# Patient Record
Sex: FEMALE | Race: OTHER | HISPANIC OR LATINO | Employment: FULL TIME | URBAN - METROPOLITAN AREA
[De-identification: names, ages, dates, MRNs, and addresses within clinical notes are randomized per-mention and may not be internally consistent; named-entity substitution may affect disease eponyms.]

---

## 2017-02-17 ENCOUNTER — ALLSCRIPTS OFFICE VISIT (OUTPATIENT)
Dept: OTHER | Facility: OTHER | Age: 27
End: 2017-02-17

## 2017-02-17 DIAGNOSIS — M25.551 PAIN IN RIGHT HIP: ICD-10-CM

## 2017-02-17 DIAGNOSIS — R20.2 PARESTHESIA OF SKIN: ICD-10-CM

## 2017-02-19 ENCOUNTER — APPOINTMENT (OUTPATIENT)
Dept: LAB | Facility: HOSPITAL | Age: 27
End: 2017-02-19
Payer: COMMERCIAL

## 2017-02-19 ENCOUNTER — TRANSCRIBE ORDERS (OUTPATIENT)
Dept: LAB | Facility: HOSPITAL | Age: 27
End: 2017-02-19

## 2017-02-19 DIAGNOSIS — M25.551 PAIN IN RIGHT HIP: ICD-10-CM

## 2017-02-19 DIAGNOSIS — R20.2 PARESTHESIA OF SKIN: ICD-10-CM

## 2017-02-19 LAB
ALBUMIN SERPL BCP-MCNC: 3.5 G/DL (ref 3.5–5)
ALP SERPL-CCNC: 72 U/L (ref 46–116)
ALT SERPL W P-5'-P-CCNC: 26 U/L (ref 12–78)
ANION GAP SERPL CALCULATED.3IONS-SCNC: 9 MMOL/L (ref 4–13)
AST SERPL W P-5'-P-CCNC: 10 U/L (ref 5–45)
BASOPHILS # BLD AUTO: 0.01 THOUSANDS/ΜL (ref 0–0.1)
BASOPHILS NFR BLD AUTO: 0 % (ref 0–1)
BILIRUB SERPL-MCNC: 0.43 MG/DL (ref 0.2–1)
BUN SERPL-MCNC: 9 MG/DL (ref 5–25)
CALCIUM SERPL-MCNC: 8.8 MG/DL (ref 8.3–10.1)
CHLORIDE SERPL-SCNC: 108 MMOL/L (ref 100–108)
CO2 SERPL-SCNC: 27 MMOL/L (ref 21–32)
CREAT SERPL-MCNC: 0.69 MG/DL (ref 0.6–1.3)
EOSINOPHIL # BLD AUTO: 0.25 THOUSAND/ΜL (ref 0–0.61)
EOSINOPHIL NFR BLD AUTO: 4 % (ref 0–6)
ERYTHROCYTE [DISTWIDTH] IN BLOOD BY AUTOMATED COUNT: 13.2 % (ref 11.6–15.1)
GFR SERPL CREATININE-BSD FRML MDRD: >60 ML/MIN/1.73SQ M
GLUCOSE SERPL-MCNC: 81 MG/DL (ref 65–140)
HCT VFR BLD AUTO: 37.7 % (ref 34.8–46.1)
HGB BLD-MCNC: 12.5 G/DL (ref 11.5–15.4)
LYMPHOCYTES # BLD AUTO: 1.95 THOUSANDS/ΜL (ref 0.6–4.47)
LYMPHOCYTES NFR BLD AUTO: 34 % (ref 14–44)
MCH RBC QN AUTO: 28.9 PG (ref 26.8–34.3)
MCHC RBC AUTO-ENTMCNC: 33.2 G/DL (ref 31.4–37.4)
MCV RBC AUTO: 87 FL (ref 82–98)
MONOCYTES # BLD AUTO: 0.41 THOUSAND/ΜL (ref 0.17–1.22)
MONOCYTES NFR BLD AUTO: 7 % (ref 4–12)
NEUTROPHILS # BLD AUTO: 3.15 THOUSANDS/ΜL (ref 1.85–7.62)
NEUTS SEG NFR BLD AUTO: 55 % (ref 43–75)
NRBC BLD AUTO-RTO: 0 /100 WBCS
PLATELET # BLD AUTO: 257 THOUSANDS/UL (ref 149–390)
PMV BLD AUTO: 11.1 FL (ref 8.9–12.7)
POTASSIUM SERPL-SCNC: 3.5 MMOL/L (ref 3.5–5.3)
PROT SERPL-MCNC: 7.5 G/DL (ref 6.4–8.2)
RBC # BLD AUTO: 4.32 MILLION/UL (ref 3.81–5.12)
SODIUM SERPL-SCNC: 144 MMOL/L (ref 136–145)
TSH SERPL DL<=0.05 MIU/L-ACNC: 1.1 UIU/ML (ref 0.36–3.74)
WBC # BLD AUTO: 5.78 THOUSAND/UL (ref 4.31–10.16)

## 2017-02-19 PROCEDURE — 85025 COMPLETE CBC W/AUTO DIFF WBC: CPT

## 2017-02-19 PROCEDURE — 84443 ASSAY THYROID STIM HORMONE: CPT

## 2017-02-19 PROCEDURE — 80053 COMPREHEN METABOLIC PANEL: CPT

## 2017-02-19 PROCEDURE — 36415 COLL VENOUS BLD VENIPUNCTURE: CPT

## 2017-02-20 ENCOUNTER — HOSPITAL ENCOUNTER (OUTPATIENT)
Dept: RADIOLOGY | Facility: HOSPITAL | Age: 27
Discharge: HOME | End: 2017-02-20
Payer: COMMERCIAL

## 2017-02-20 DIAGNOSIS — M25.551 PAIN IN RIGHT HIP: ICD-10-CM

## 2017-02-20 PROCEDURE — 73502 X-RAY EXAM HIP UNI 2-3 VIEWS: CPT

## 2017-03-08 ENCOUNTER — ALLSCRIPTS OFFICE VISIT (OUTPATIENT)
Dept: OTHER | Facility: OTHER | Age: 27
End: 2017-03-08

## 2017-03-28 ENCOUNTER — GENERIC CONVERSION - ENCOUNTER (OUTPATIENT)
Dept: OTHER | Facility: OTHER | Age: 27
End: 2017-03-28

## 2017-03-30 ENCOUNTER — ALLSCRIPTS OFFICE VISIT (OUTPATIENT)
Dept: OTHER | Facility: OTHER | Age: 27
End: 2017-03-30

## 2017-03-30 DIAGNOSIS — R19.7 DIARRHEA: ICD-10-CM

## 2017-04-18 ENCOUNTER — GENERIC CONVERSION - ENCOUNTER (OUTPATIENT)
Dept: OTHER | Facility: OTHER | Age: 27
End: 2017-04-18

## 2017-05-24 ENCOUNTER — HOSPITAL ENCOUNTER (EMERGENCY)
Facility: HOSPITAL | Age: 27
Discharge: HOME | End: 2017-05-24
Attending: EMERGENCY MEDICINE | Admitting: EMERGENCY MEDICINE
Payer: COMMERCIAL

## 2017-05-24 VITALS
SYSTOLIC BLOOD PRESSURE: 120 MMHG | WEIGHT: 238 LBS | BODY MASS INDEX: 39.61 KG/M2 | RESPIRATION RATE: 16 BRPM | OXYGEN SATURATION: 99 % | HEART RATE: 88 BPM | TEMPERATURE: 98 F | DIASTOLIC BLOOD PRESSURE: 69 MMHG

## 2017-05-24 DIAGNOSIS — R10.9 NONSPECIFIC ABDOMINAL PAIN: Primary | ICD-10-CM

## 2017-05-24 LAB
ALBUMIN SERPL BCP-MCNC: 3.3 G/DL (ref 3.5–5)
ALP SERPL-CCNC: 69 U/L (ref 46–116)
ALT SERPL W P-5'-P-CCNC: 22 U/L (ref 12–78)
ANION GAP SERPL CALCULATED.3IONS-SCNC: 7 MMOL/L (ref 4–13)
AST SERPL W P-5'-P-CCNC: 7 U/L (ref 5–45)
BASOPHILS # BLD AUTO: 0.01 THOUSANDS/ΜL (ref 0–0.1)
BASOPHILS NFR BLD AUTO: 0 % (ref 0–1)
BILIRUB SERPL-MCNC: 0.28 MG/DL (ref 0.2–1)
BUN SERPL-MCNC: 12 MG/DL (ref 5–25)
CALCIUM SERPL-MCNC: 8.7 MG/DL (ref 8.3–10.1)
CHLORIDE SERPL-SCNC: 106 MMOL/L (ref 100–108)
CO2 SERPL-SCNC: 28 MMOL/L (ref 21–32)
CREAT SERPL-MCNC: 0.74 MG/DL (ref 0.6–1.3)
EOSINOPHIL # BLD AUTO: 0.36 THOUSAND/ΜL (ref 0–0.61)
EOSINOPHIL NFR BLD AUTO: 4 % (ref 0–6)
ERYTHROCYTE [DISTWIDTH] IN BLOOD BY AUTOMATED COUNT: 12.8 % (ref 11.6–15.1)
GFR SERPL CREATININE-BSD FRML MDRD: >60 ML/MIN/1.73SQ M
GLUCOSE SERPL-MCNC: 88 MG/DL (ref 65–140)
HCT VFR BLD AUTO: 37.8 % (ref 34.8–46.1)
HGB BLD-MCNC: 12.8 G/DL (ref 11.5–15.4)
LIPASE SERPL-CCNC: 106 U/L (ref 73–393)
LYMPHOCYTES # BLD AUTO: 2.02 THOUSANDS/ΜL (ref 0.6–4.47)
LYMPHOCYTES NFR BLD AUTO: 22 % (ref 14–44)
MCH RBC QN AUTO: 29.5 PG (ref 26.8–34.3)
MCHC RBC AUTO-ENTMCNC: 33.9 G/DL (ref 31.4–37.4)
MCV RBC AUTO: 87 FL (ref 82–98)
MONOCYTES # BLD AUTO: 0.54 THOUSAND/ΜL (ref 0.17–1.22)
MONOCYTES NFR BLD AUTO: 6 % (ref 4–12)
NEUTROPHILS # BLD AUTO: 6.37 THOUSANDS/ΜL (ref 1.85–7.62)
NEUTS SEG NFR BLD AUTO: 68 % (ref 43–75)
NRBC BLD AUTO-RTO: 0 /100 WBCS
PLATELET # BLD AUTO: 229 THOUSANDS/UL (ref 149–390)
PMV BLD AUTO: 10.5 FL (ref 8.9–12.7)
POTASSIUM SERPL-SCNC: 3.9 MMOL/L (ref 3.5–5.3)
PROT SERPL-MCNC: 7.4 G/DL (ref 6.4–8.2)
RBC # BLD AUTO: 4.34 MILLION/UL (ref 3.81–5.12)
SODIUM SERPL-SCNC: 141 MMOL/L (ref 136–145)
WBC # BLD AUTO: 9.31 THOUSAND/UL (ref 4.31–10.16)

## 2017-05-24 PROCEDURE — 80053 COMPREHEN METABOLIC PANEL: CPT | Performed by: EMERGENCY MEDICINE

## 2017-05-24 PROCEDURE — 36415 COLL VENOUS BLD VENIPUNCTURE: CPT | Performed by: EMERGENCY MEDICINE

## 2017-05-24 PROCEDURE — 83690 ASSAY OF LIPASE: CPT | Performed by: EMERGENCY MEDICINE

## 2017-05-24 PROCEDURE — 99284 EMERGENCY DEPT VISIT MOD MDM: CPT

## 2017-05-24 PROCEDURE — 85025 COMPLETE CBC W/AUTO DIFF WBC: CPT | Performed by: EMERGENCY MEDICINE

## 2017-05-24 RX ORDER — DICYCLOMINE HCL 20 MG
20 TABLET ORAL ONCE
Status: COMPLETED | OUTPATIENT
Start: 2017-05-24 | End: 2017-05-24

## 2017-05-24 RX ORDER — MAGNESIUM HYDROXIDE/ALUMINUM HYDROXICE/SIMETHICONE 120; 1200; 1200 MG/30ML; MG/30ML; MG/30ML
30 SUSPENSION ORAL ONCE
Status: COMPLETED | OUTPATIENT
Start: 2017-05-24 | End: 2017-05-24

## 2017-05-24 RX ORDER — ONDANSETRON 4 MG/1
4 TABLET, FILM COATED ORAL EVERY 6 HOURS
Qty: 20 TABLET | Refills: 0 | Status: SHIPPED | OUTPATIENT
Start: 2017-05-24 | End: 2018-05-21

## 2017-05-24 RX ORDER — ONDANSETRON 4 MG/1
4 TABLET, ORALLY DISINTEGRATING ORAL ONCE
Status: COMPLETED | OUTPATIENT
Start: 2017-05-24 | End: 2017-05-24

## 2017-05-24 RX ORDER — SUCRALFATE 1 G/1
1 TABLET ORAL 4 TIMES DAILY
Qty: 30 TABLET | Refills: 0 | Status: SHIPPED | OUTPATIENT
Start: 2017-05-24 | End: 2018-05-21

## 2017-05-24 RX ADMIN — ONDANSETRON 4 MG: 4 TABLET, ORALLY DISINTEGRATING ORAL at 22:00

## 2017-05-24 RX ADMIN — LIDOCAINE HYDROCHLORIDE 15 ML: 20 SOLUTION ORAL; TOPICAL at 21:06

## 2017-05-24 RX ADMIN — ALUMINUM HYDROXIDE, MAGNESIUM HYDROXIDE, AND SIMETHICONE 30 ML: 200; 200; 20 SUSPENSION ORAL at 21:06

## 2017-05-24 RX ADMIN — ONDANSETRON 4 MG: 4 TABLET, ORALLY DISINTEGRATING ORAL at 21:06

## 2017-05-24 RX ADMIN — DICYCLOMINE HYDROCHLORIDE 20 MG: 20 TABLET ORAL at 21:59

## 2017-05-25 ENCOUNTER — ALLSCRIPTS OFFICE VISIT (OUTPATIENT)
Dept: OTHER | Facility: OTHER | Age: 27
End: 2017-05-25

## 2017-06-16 ENCOUNTER — ALLSCRIPTS OFFICE VISIT (OUTPATIENT)
Dept: OTHER | Facility: OTHER | Age: 27
End: 2017-06-16

## 2017-07-12 ENCOUNTER — ALLSCRIPTS OFFICE VISIT (OUTPATIENT)
Dept: OTHER | Facility: OTHER | Age: 27
End: 2017-07-12

## 2017-07-12 DIAGNOSIS — R10.9 ABDOMINAL PAIN: ICD-10-CM

## 2017-07-21 ENCOUNTER — APPOINTMENT (OUTPATIENT)
Dept: LAB | Facility: CLINIC | Age: 27
End: 2017-07-21
Payer: COMMERCIAL

## 2017-07-21 DIAGNOSIS — R10.9 ABDOMINAL PAIN: ICD-10-CM

## 2017-07-21 DIAGNOSIS — R19.7 DIARRHEA: ICD-10-CM

## 2017-07-21 LAB
ALBUMIN SERPL BCP-MCNC: 3.7 G/DL (ref 3.5–5)
ALP SERPL-CCNC: 87 U/L (ref 46–116)
ALT SERPL W P-5'-P-CCNC: 73 U/L (ref 12–78)
ANION GAP SERPL CALCULATED.3IONS-SCNC: 6 MMOL/L (ref 4–13)
AST SERPL W P-5'-P-CCNC: 36 U/L (ref 5–45)
BASOPHILS # BLD AUTO: 0.02 THOUSANDS/ΜL (ref 0–0.1)
BASOPHILS NFR BLD AUTO: 0 % (ref 0–1)
BILIRUB SERPL-MCNC: 0.63 MG/DL (ref 0.2–1)
BUN SERPL-MCNC: 7 MG/DL (ref 5–25)
CALCIUM SERPL-MCNC: 9.2 MG/DL (ref 8.3–10.1)
CHLORIDE SERPL-SCNC: 105 MMOL/L (ref 100–108)
CO2 SERPL-SCNC: 28 MMOL/L (ref 21–32)
CREAT SERPL-MCNC: 0.73 MG/DL (ref 0.6–1.3)
CRP SERPL QL: 23.2 MG/L
EOSINOPHIL # BLD AUTO: 0.27 THOUSAND/ΜL (ref 0–0.61)
EOSINOPHIL NFR BLD AUTO: 6 % (ref 0–6)
ERYTHROCYTE [DISTWIDTH] IN BLOOD BY AUTOMATED COUNT: 13.3 % (ref 11.6–15.1)
ERYTHROCYTE [SEDIMENTATION RATE] IN BLOOD: 18 MM/HOUR (ref 0–20)
GFR SERPL CREATININE-BSD FRML MDRD: >60 ML/MIN/1.73SQ M
GLUCOSE P FAST SERPL-MCNC: 72 MG/DL (ref 65–99)
HCT VFR BLD AUTO: 40.9 % (ref 34.8–46.1)
HGB BLD-MCNC: 13.5 G/DL (ref 11.5–15.4)
LYMPHOCYTES # BLD AUTO: 1.66 THOUSANDS/ΜL (ref 0.6–4.47)
LYMPHOCYTES NFR BLD AUTO: 35 % (ref 14–44)
MCH RBC QN AUTO: 28.8 PG (ref 26.8–34.3)
MCHC RBC AUTO-ENTMCNC: 33 G/DL (ref 31.4–37.4)
MCV RBC AUTO: 87 FL (ref 82–98)
MONOCYTES # BLD AUTO: 0.65 THOUSAND/ΜL (ref 0.17–1.22)
MONOCYTES NFR BLD AUTO: 14 % (ref 4–12)
NEUTROPHILS # BLD AUTO: 2.14 THOUSANDS/ΜL (ref 1.85–7.62)
NEUTS SEG NFR BLD AUTO: 45 % (ref 43–75)
NRBC BLD AUTO-RTO: 0 /100 WBCS
PLATELET # BLD AUTO: 249 THOUSANDS/UL (ref 149–390)
PMV BLD AUTO: 10.9 FL (ref 8.9–12.7)
POTASSIUM SERPL-SCNC: 3.6 MMOL/L (ref 3.5–5.3)
PROT SERPL-MCNC: 7.9 G/DL (ref 6.4–8.2)
RBC # BLD AUTO: 4.69 MILLION/UL (ref 3.81–5.12)
SODIUM SERPL-SCNC: 139 MMOL/L (ref 136–145)
TSH SERPL DL<=0.05 MIU/L-ACNC: 1.17 UIU/ML (ref 0.36–3.74)
WBC # BLD AUTO: 4.74 THOUSAND/UL (ref 4.31–10.16)

## 2017-07-21 PROCEDURE — 83516 IMMUNOASSAY NONANTIBODY: CPT

## 2017-07-21 PROCEDURE — 85025 COMPLETE CBC W/AUTO DIFF WBC: CPT

## 2017-07-21 PROCEDURE — 82784 ASSAY IGA/IGD/IGG/IGM EACH: CPT

## 2017-07-21 PROCEDURE — 80053 COMPREHEN METABOLIC PANEL: CPT

## 2017-07-21 PROCEDURE — 86255 FLUORESCENT ANTIBODY SCREEN: CPT

## 2017-07-21 PROCEDURE — 36415 COLL VENOUS BLD VENIPUNCTURE: CPT

## 2017-07-21 PROCEDURE — 84443 ASSAY THYROID STIM HORMONE: CPT

## 2017-07-21 PROCEDURE — 86140 C-REACTIVE PROTEIN: CPT

## 2017-07-21 PROCEDURE — 85652 RBC SED RATE AUTOMATED: CPT

## 2017-07-23 LAB
ENDOMYSIUM IGA SER QL: NEGATIVE
GLIADIN PEPTIDE IGA SER-ACNC: 6 UNITS (ref 0–19)
GLIADIN PEPTIDE IGG SER-ACNC: 2 UNITS (ref 0–19)
IGA SERPL-MCNC: 163 MG/DL (ref 87–352)
TTG IGA SER-ACNC: <2 U/ML (ref 0–3)
TTG IGG SER-ACNC: 5 U/ML (ref 0–5)

## 2017-07-24 ENCOUNTER — GENERIC CONVERSION - ENCOUNTER (OUTPATIENT)
Dept: OTHER | Facility: OTHER | Age: 27
End: 2017-07-24

## 2017-08-01 ENCOUNTER — GENERIC CONVERSION - ENCOUNTER (OUTPATIENT)
Dept: OTHER | Facility: OTHER | Age: 27
End: 2017-08-01

## 2017-08-03 ENCOUNTER — ANESTHESIA EVENT (OUTPATIENT)
Dept: GASTROENTEROLOGY | Facility: HOSPITAL | Age: 27
End: 2017-08-03
Payer: COMMERCIAL

## 2017-08-03 RX ORDER — ETONOGESTREL AND ETHINYL ESTRADIOL 11.7; 2.7 MG/1; MG/1
1 INSERT, EXTENDED RELEASE VAGINAL
COMMUNITY
End: 2018-05-21

## 2017-08-03 RX ORDER — DICYCLOMINE HCL 20 MG
20 TABLET ORAL EVERY 6 HOURS
COMMUNITY
End: 2018-05-21

## 2017-08-04 ENCOUNTER — HOSPITAL ENCOUNTER (OUTPATIENT)
Facility: HOSPITAL | Age: 27
Setting detail: OUTPATIENT SURGERY
Discharge: HOME | End: 2017-08-04
Attending: INTERNAL MEDICINE | Admitting: INTERNAL MEDICINE
Payer: COMMERCIAL

## 2017-08-04 ENCOUNTER — GENERIC CONVERSION - ENCOUNTER (OUTPATIENT)
Dept: OTHER | Facility: OTHER | Age: 27
End: 2017-08-04

## 2017-08-04 ENCOUNTER — ANESTHESIA (OUTPATIENT)
Dept: GASTROENTEROLOGY | Facility: HOSPITAL | Age: 27
End: 2017-08-04
Payer: COMMERCIAL

## 2017-08-04 VITALS
WEIGHT: 238 LBS | HEART RATE: 66 BPM | DIASTOLIC BLOOD PRESSURE: 74 MMHG | HEIGHT: 65 IN | OXYGEN SATURATION: 99 % | BODY MASS INDEX: 39.65 KG/M2 | SYSTOLIC BLOOD PRESSURE: 118 MMHG | TEMPERATURE: 98.1 F | RESPIRATION RATE: 20 BRPM

## 2017-08-04 DIAGNOSIS — R10.9 ABDOMINAL PAIN: ICD-10-CM

## 2017-08-04 LAB — EXT PREGNANCY TEST URINE: NEGATIVE

## 2017-08-04 PROCEDURE — 81025 URINE PREGNANCY TEST: CPT | Performed by: ANESTHESIOLOGY

## 2017-08-04 PROCEDURE — 88342 IMHCHEM/IMCYTCHM 1ST ANTB: CPT | Performed by: INTERNAL MEDICINE

## 2017-08-04 PROCEDURE — 88305 TISSUE EXAM BY PATHOLOGIST: CPT | Performed by: INTERNAL MEDICINE

## 2017-08-04 RX ORDER — PROPOFOL 10 MG/ML
INJECTION, EMULSION INTRAVENOUS AS NEEDED
Status: DISCONTINUED | OUTPATIENT
Start: 2017-08-04 | End: 2017-08-04 | Stop reason: SURG

## 2017-08-04 RX ORDER — 0.9 % SODIUM CHLORIDE 0.9 %
5 VIAL (ML) INJECTION AS NEEDED
Status: CANCELLED | OUTPATIENT
Start: 2017-08-04

## 2017-08-04 RX ORDER — SODIUM CHLORIDE 9 MG/ML
125 INJECTION, SOLUTION INTRAVENOUS CONTINUOUS
Status: DISCONTINUED | OUTPATIENT
Start: 2017-08-04 | End: 2017-08-04 | Stop reason: HOSPADM

## 2017-08-04 RX ORDER — LIDOCAINE HYDROCHLORIDE 20 MG/ML
INJECTION, SOLUTION INFILTRATION; PERINEURAL AS NEEDED
Status: DISCONTINUED | OUTPATIENT
Start: 2017-08-04 | End: 2017-08-04 | Stop reason: SURG

## 2017-08-04 RX ORDER — MIDAZOLAM HYDROCHLORIDE 1 MG/ML
INJECTION INTRAMUSCULAR; INTRAVENOUS AS NEEDED
Status: DISCONTINUED | OUTPATIENT
Start: 2017-08-04 | End: 2017-08-04 | Stop reason: SURG

## 2017-08-04 RX ADMIN — PROPOFOL 50 MG: 10 INJECTION, EMULSION INTRAVENOUS at 11:50

## 2017-08-04 RX ADMIN — PROPOFOL 100 MG: 10 INJECTION, EMULSION INTRAVENOUS at 11:38

## 2017-08-04 RX ADMIN — MIDAZOLAM HYDROCHLORIDE 2 MG: 1 INJECTION, SOLUTION INTRAMUSCULAR; INTRAVENOUS at 11:32

## 2017-08-04 RX ADMIN — PROPOFOL 100 MG: 10 INJECTION, EMULSION INTRAVENOUS at 11:45

## 2017-08-04 RX ADMIN — PROPOFOL 100 MG: 10 INJECTION, EMULSION INTRAVENOUS at 11:42

## 2017-08-04 RX ADMIN — LIDOCAINE HYDROCHLORIDE 3 ML: 20 INJECTION, SOLUTION INFILTRATION; PERINEURAL at 11:35

## 2017-08-04 RX ADMIN — PROPOFOL 100 MG: 10 INJECTION, EMULSION INTRAVENOUS at 11:35

## 2017-08-04 RX ADMIN — PROPOFOL 100 MG: 10 INJECTION, EMULSION INTRAVENOUS at 11:48

## 2017-08-04 RX ADMIN — SODIUM CHLORIDE 125 ML/HR: 0.9 INJECTION, SOLUTION INTRAVENOUS at 10:33

## 2017-08-08 ENCOUNTER — TRANSCRIBE ORDERS (OUTPATIENT)
Dept: ADMINISTRATIVE | Facility: HOSPITAL | Age: 27
End: 2017-08-08

## 2017-08-08 DIAGNOSIS — R10.9 ABDOMINAL PAIN, UNSPECIFIED SITE: Primary | ICD-10-CM

## 2017-08-18 ENCOUNTER — ALLSCRIPTS OFFICE VISIT (OUTPATIENT)
Dept: OTHER | Facility: OTHER | Age: 27
End: 2017-08-18

## 2017-08-22 ENCOUNTER — HOSPITAL ENCOUNTER (OUTPATIENT)
Dept: NUCLEAR MEDICINE | Facility: HOSPITAL | Age: 27
Discharge: HOME | End: 2017-08-22
Attending: INTERNAL MEDICINE
Payer: COMMERCIAL

## 2017-08-22 DIAGNOSIS — R10.9 ABDOMINAL PAIN, UNSPECIFIED SITE: ICD-10-CM

## 2017-08-23 ENCOUNTER — GENERIC CONVERSION - ENCOUNTER (OUTPATIENT)
Dept: OTHER | Facility: OTHER | Age: 27
End: 2017-08-23

## 2018-01-11 NOTE — RESULT NOTES
Verified Results  (1) COMPREHENSIVE METABOLIC PANEL 82PZP3490 78:44CS Miah Marie Odilia Order Number: [id number]     Test Name Result Flag Reference   SODIUM 139 mmol/L  136-145   POTASSIUM 3 6 mmol/L  3 5-5 3   CHLORIDE 105 mmol/L  100-108   CARBON DIOXIDE 28 mmol/L  21-32   ANION GAP (CALC) 6 mmol/L  4-13   BLOOD UREA NITROGEN 7 mg/dL  5-25   CREATININE 0 73 mg/dL  0 60-1 30   Standardized to IDMS reference method   CALCIUM 9 2 mg/dL  8 3-10 1   BILI, TOTAL 0 63 mg/dL  0 20-1 00   ALK PHOSPHATAS 87 U/L     ALT (SGPT) 73 U/L  12-78   AST(SGOT) 36 U/L  5-45   ALBUMIN 3 7 g/dL  3 5-5 0   TOTAL PROTEIN 7 9 g/dL  6 4-8 2   eGFR Non-African American      >60 0 ml/min/1 73sq m   [facility] Disease Education Program recommendations are as follows:  GFR calculation is accurate only with a steady state creatinine  Chronic Kidney disease less than 60 ml/min/1 73 sq  meters  Kidney failure less than 15 ml/min/1 73 sq  meters  GLUCOSE FASTING 72 mg/dL  65-99     (1) CBC/PLT/DIFF 34TXP4683 09:51AM Benjamintiffanie Margie    Order Number: [id number]     Test Name Result Flag Reference   WBC COUNT 4 74 Thousand/uL  4 31-10 16   RBC COUNT 4 69 Million/uL  3 81-5 12   HEMOGLOBIN 13 5 g/dL  11 5-15 4   HEMATOCRIT 40 9 %  34 8-46  1   MCV 87 fL  82-98   MCH 28 8 pg  26 8-34 3   MCHC 33 0 g/dL  31 4-37 4   RDW 13 3 %  11 6-15 1   MPV 10 9 fL  8 9-12 7   PLATELET COUNT 772 Thousands/uL  149-390   nRBC AUTOMATED 0 /100 WBCs     NEUTROPHILS RELATIVE PERCENT 45 %  43-75   LYMPHOCYTES RELATIVE PERCENT 35 %  14-44   MONOCYTES RELATIVE PERCENT 14 % H 4-12   EOSINOPHILS RELATIVE PERCENT 6 %  0-6   BASOPHILS RELATIVE PERCENT 0 %  0-1   NEUTROPHILS ABSOLUTE COUNT 2 14 Thousands/? ??L  1 85-7 62   LYMPHOCYTES ABSOLUTE COUNT 1 66 Thousands/? ??L  0 60-4 47   MONOCYTES ABSOLUTE COUNT 0 65 Thousand/? ??L  0 17-1 22   EOSINOPHILS ABSOLUTE COUNT 0 27 Thousand/? ??L  0 00-0 61   BASOPHILS ABSOLUTE COUNT 0 02 Thousands/? ? ? L 0  00-0 10   - Patient Instructions: This bloodwork is non-fasting  Please drink two glasses of water morning of bloodwork  (1) TSH WITH FT4 REFLEX 77Mft5511 09:51AM Юлия Marie Order Number: [id number]     Test Name Result Flag Reference   TSH 1 170 uIU/mL  0 358-3 740   Patients undergoing fluorescein dye angiography may retain small amounts of fluorescein in the body for 48-72 hours post procedure  Samples containing fluorescein can produce falsely depressed TSH values  If the patient had this procedure,a specimen should be resubmitted post fluorescein clearance            The recommended reference ranges for TSH during pregnancy are as follows:  First trimester 0 1 to 2 5 uIU/mL  Second trimester  0 2 to 3 0 uIU/mL  Third trimester 0 3 to 3 0 uIU/m

## 2018-01-11 NOTE — PROGRESS NOTES
Active Problems    1  Asthma (493 90) (J45 909)   2  Currently pregnant (V22 2) (Z33 1)   3  Fetal choroid plexus cysts affecting antepartum care of mother (655 03) (O35 8XX0)   4  History of Morbid or severe obesity due to excess calories (278 01) (E66 01)    Current Meds    1  Iron Supplement TABS; Therapy: (Recorded:12Jan2016) to Recorded    Allergies    1  No Known Drug Allergies    2  Food   3  FRUIT   4  Seafood   5  Shellfish    Results/Data  28902 Abdominal Ultrasound OB Cheryle Fore:   Procedure: 58994- Ultrasound pregnant uterus real time with image documentation, limited one or more fetuses  The study was done today in the office  Indication: EDC gestational age 43w4d with an EDC of 2/21/2016 weeks  Exam indication: obesity  Findings:   Amniotic fluid volume: 48 3 + 44 5 + 29 3 + 3 8mm = 125 9mm  Fetal position: vertex  Impression: Appropriate DUNCAN for fetal age  Future Appointments    Date/Time Provider Specialty Site   02/08/2016 11:15 AM JOSH Mayer  Obstetrics/Gynecology [facility]   02/22/2016 11:15 AM JOSH Mayer   Obstetrics[facility]necology [facility]   02/15/2016 01:45 PM Jennifer Whatley MD Obstetrics/Gynecology [facility]   02/08/2016 10:30 AM Dontrell Garibay Ultrasound [facility]   02/15/2016 01:00 PM Dontrell Garibay Ultrasound [facility]   02/22/2016 10:30 AM Dontrell Garibay Ultrasound [facility]     Signatures   Electronically signed by : Ellis Palmer, ; Feb 8 2016 10:33AM EST                       (Author)    Electronically signed by : JOSH Arana ; Feb 8 2016 11:16AM EST

## 2018-01-11 NOTE — RESULT NOTES
Verified Results  (1) SED RATE 21Jul2017 09:51AM Efrain PARSONS Order Number: [id number]     Test Name Result Flag Reference   SED RATE 18 mm/hour  0-20     (1) C-REACTIVE PROTEIN 21Jul2017 09:51AM Liz Hernandez Order Number: [id number]     Test Name Result Flag Reference   C-REACT PROTEIN 23 2 mg/L H <3 0     (1) CELIAC DISEASE AB PROFILE 21Jul2017 09:51AM Liz Hernandez Order Number: [id number]     Test Name Result Flag Reference   tTG IGG 5 U/mL  0 - 5   Negative        0 - 5                                Weak Positive   6 - 9                                Positive           >9   tTG IGA <2 U/mL  0 - 3   Negative        0 -  3                                Weak Positive   4 - 10                                Positive           >10   Tissue Transglutaminase (tTG) has been identified   as the endomysial antigen  Studies have demonstr-   ated that endomysial IgA antibodies have over 99%   specificity for gluten sensitive enteropathy     GLIADA 6 units  0 - 19   Negative                   0 - 19                     Weak Positive             20 - 30                     Moderate to Strong Positive   >30   GLIADG 2 units  0 - 19   Negative                   0 - 19                     Weak Positive             20 - 30                     Moderate to Strong Positive   >30   ENDOMYSIAL AB IGA Negative  Negative   Performed at:  [address] [place] [address]  [id number]  : Martine Roblero MD, Phone:  [phone]    mg/dL  15 - 417

## 2018-01-12 NOTE — MISCELLANEOUS
Message   Date: 11 Jan 2016 8:27 AM EST, Recorded By: Kiara Panchal For: Karan Schmidt: Kermit Flaherty, Self   Phone: [phone] [facility]), [phone] (Work)   Spoke with patient  She called wanting to come in today, but she was unavailable at any of the several times I offered her  I advised her that she is not following the medical advice that we are giving her and that she is putting herself and her baby at risk  She said she she has to work  I reminded her I set her schedule up according what was fine with her schedule at the time  I told her we will write her a note for work stating the importance of her visits and she is required to attend them to be medically compliant  She said she cannot call in sick again and I advised her she does not have to call in sick, she can go to work, leave for her visit, and then return to work after she is seen  I will leave a note at the  for her for her employer  Patient is agreeable  Active Problems   1  Asthma (493 90) (J45 909)  2  Currently pregnant (V22 2) (Z33 1)  3  Fetal choroid plexus cysts affecting antepartum care of mother (655 03) (O35 8XX0)  4  History of Morbid or severe obesity due to excess calories (278 01) (E66 01)    Current Meds  1  No Reported Medications  Requested for: 67OFM7204 Recorded    Allergies   1  No Known Drug Allergies   2  Food  3  FRUIT  4  Seafood  5   Shellfish    Signatures   Electronically signed by : Shad Carlos, ; Jan 11 2016  8:50AM EST                       (Author)

## 2018-01-13 VITALS
OXYGEN SATURATION: 99 % | BODY MASS INDEX: 40.97 KG/M2 | HEIGHT: 64 IN | SYSTOLIC BLOOD PRESSURE: 110 MMHG | WEIGHT: 240 LBS | HEART RATE: 84 BPM | TEMPERATURE: 98.8 F | RESPIRATION RATE: 18 BRPM | DIASTOLIC BLOOD PRESSURE: 80 MMHG

## 2018-01-13 VITALS
DIASTOLIC BLOOD PRESSURE: 80 MMHG | TEMPERATURE: 97.6 F | BODY MASS INDEX: 40.63 KG/M2 | OXYGEN SATURATION: 98 % | HEIGHT: 64 IN | SYSTOLIC BLOOD PRESSURE: 120 MMHG | WEIGHT: 238 LBS | HEART RATE: 81 BPM

## 2018-01-13 VITALS
OXYGEN SATURATION: 97 % | HEART RATE: 82 BPM | WEIGHT: 236 LBS | DIASTOLIC BLOOD PRESSURE: 68 MMHG | HEIGHT: 64 IN | BODY MASS INDEX: 40.29 KG/M2 | RESPIRATION RATE: 20 BRPM | TEMPERATURE: 97.4 F | SYSTOLIC BLOOD PRESSURE: 118 MMHG

## 2018-01-13 NOTE — MISCELLANEOUS
Message   Date: 28 Mar 2017 8:27 AM EST, Recorded By: Rosales Solo For: Melissa Denise: Ana Sanchez, Self   Phone: [phone] [facility]), [phone] (Work)   Reason: Renew Medication   Patient called for refill of Nuva Ring  Escript sent  Advised schedule yearly exam         Active Problems    1  Anxiety disorder (300 00) (F41 9)   2  Asthma (493 90) (J45 909)   3  Bipolar depression (296 50) (F31 30)   4  Birth control counseling (V25 09) (Z30 09)   5  Chronic right hip pain (719 45,338 29) (M25 551,G89 29)   6  Contraception (V25 9) (Z30 9)   7  Depression (311) (F32 9)   8  Encounter for postpartum visit (V24 2) (Z39 2)   9  Endometriosis (617 9) (N80 9)   10  Female pelvic pain (625 9) (R10 2)   11  Irregular menses (626 4) (N92 6)   12  History of IUD contraception (V45 51) (Z97 5)   13  History of Morbid or severe obesity due to excess calories (278 01) (E66 01)   14  Paresthesia of both feet (782 0) (R20 2)   15  Postprandial RUQ pain (789 01) (R10 11)    Current Meds   1  NuvaRing 0 12-0 015 MG/24HR Vaginal Ring; INSERT 1 RING VAGINALLY FOR 3   WEEKS THEN 1 WEEK OFF; Therapy: 68BHF5808 to (Last Rx:60Bvm5559)  Requested for: 65WKG1717 Ordered    Allergies    1  No Known Drug Allergies    2  Food   3  FRUIT   4  Seafood   5   Shellfish    Plan  Contraception    · NuvaRing 0 12-0 015 MG/24HR Vaginal Ring; INSERT 1 RING VAGINALLY FOR  3 WEEKS THEN 1 WEEK OFF    Signatures   Electronically signed by : Albina Salas, ; Mar 28 2017  8:28AM EST                       (Author)

## 2018-01-13 NOTE — PROGRESS NOTES
Active Problems    1  Asthma (493 90) (J45 909)   2  Currently pregnant (V22 2) (Z33 1)   3  Fetal choroid plexus cysts affecting antepartum care of mother (655 03) (O35 8XX0)   4  History of Morbid or severe obesity due to excess calories (278 01) (E66 01)    Current Meds    1  Iron Supplement TABS; Therapy: (Recorded:12Jan2016) to Recorded    Allergies    1  No Known Drug Allergies    2  Food   3  FRUIT   4  Seafood   5  Shellfish    Results/Data  81025 Abdominal Ultrasound OB Sridevi Christian:   Procedure: 42860- Ultrasound pregnant uterus real time with image documentation, limited one or more fetuses  The study was done today in the office  Indication: EDC gestational age 42w2d with an [facility] 2/21/2016 weeks  Exam indication: obesity  Findings:   Amniotic fluid volume: 54 4 + 21 3 + 33 1 +10 7 = 119 5mm  Fetal heart beat: 139bpm    Fetal position: Vertex  Impression: Appropriate DUNCAN for fetal age  Future Appointments    Date/Time Provider Specialty Site   02/08/2016 11:15 AM JOSH Anderson  Obstetrics/Gynecology [facility]   02/15/2016 01:45 PM Janna Giles MD Obstetrics/Gynecology [facility]   02/08/2016 11:00 AM Araseli Estrella Nurse Schedule  [facility]   02/22/2016 11:15 AM Araseli Estrella Nurse Schedule  [facility]   02/08/2016 10:30 AM [facility]   02/15/2016 01:00 PM Mechelle Vickers  [facility]   02/22/2016 10:30 AM Araseli Estrella [facility]trasound [facility]     Signatures   Electronically signed by : Layne Garcia, ; Feb 5 2016 10:40AM EST                       (Author)    Electronically signed by :  Nayely Sherman MD; Feb 11 2016  6:59AM EST

## 2018-01-13 NOTE — MISCELLANEOUS
Message  Patient called she wants to have the Mirena iud placed instead of having a tubal ligation  Ok per Dr Ml Bear to schedule for insertion  Patient will refrain from intercourse for 2 weeks and come in and have a pregnancy test on the day of the insertion  She just stopped breastfeeding and has not gotten her menses yet  Appointment given for Dr Ml Bear  Active Problems    1  Anxiety disorder (300 00) (F41 9)   2  Asthma (493 90) (J45 909)   3  Bipolar depression (296 50) (F31 30)   4  Birth control counseling (V25 09) (Z30 9)   5  Depression (311) (F32 9)   6  Encounter for postpartum visit (V24 2) (Z39 2)   7  Female pelvic pain (625 9) (R10 2)   8  Fetal choroid plexus cysts affecting antepartum care of mother (655 03) (O35 8XX0)   9  History of Morbid or severe obesity due to excess calories (278 01) (E66 01)   10  Postprandial RUQ pain (789 01) (R10 11)    Current Meds   1  Multi-Vitamin TABS; Therapy: (Recorded:05Apr2016) to Recorded    Allergies    1  No Known Drug Allergies    2  Food   3  FRUIT   4  Seafood   5  Shellfish    Signatures   Electronically signed by :  Alessandro Curiel, ; May 19 2016  1:23PM EST                       (Author)

## 2018-01-14 VITALS
OXYGEN SATURATION: 98 % | DIASTOLIC BLOOD PRESSURE: 68 MMHG | HEART RATE: 116 BPM | BODY MASS INDEX: 40.63 KG/M2 | HEIGHT: 64 IN | WEIGHT: 238 LBS | RESPIRATION RATE: 18 BRPM | SYSTOLIC BLOOD PRESSURE: 100 MMHG | TEMPERATURE: 98 F

## 2018-01-14 VITALS
SYSTOLIC BLOOD PRESSURE: 112 MMHG | HEIGHT: 64 IN | WEIGHT: 235 LBS | HEART RATE: 80 BPM | DIASTOLIC BLOOD PRESSURE: 74 MMHG | OXYGEN SATURATION: 98 % | TEMPERATURE: 99 F | BODY MASS INDEX: 40.12 KG/M2

## 2018-01-14 VITALS
OXYGEN SATURATION: 99 % | RESPIRATION RATE: 16 BRPM | HEIGHT: 64 IN | BODY MASS INDEX: 40.83 KG/M2 | TEMPERATURE: 97.5 F | WEIGHT: 239.13 LBS | HEART RATE: 101 BPM | SYSTOLIC BLOOD PRESSURE: 108 MMHG | DIASTOLIC BLOOD PRESSURE: 78 MMHG

## 2018-01-14 NOTE — MISCELLANEOUS
Message  Return to work or school:   Anna Conway is under my professional care  She was seen in my office on 3/30/17--written by [employer]   Electronically signed by : SUZAN Gimenez;  Apr 18 2017  9:33AM EST                       (Author)

## 2018-01-14 NOTE — MISCELLANEOUS
Message   Date: 23 Aug 2017 4:32 PM EST, Recorded By: Efren Enrique For: Anthony Villatoro: Marshal Garcia, Self   Phone: [phone] (Home), [phone] (Work)   Reason: Medical Complaint   PT IS ON A HIGH DOSE OF ANTIBIOTIC FOR A STOMACH INFECTION    SHE HAS YEAST SX  Jes Cutting CALLED IN SCRIPT FOR PT  Jes Cutting Active Problems    1  Abdominal pain (789 00) (R10 9)   2  Anxiety disorder (300 00) (F41 9)   3  Asthma (493 90) (J45 909)   4  Bipolar depression (296 50) (F31 30)   5  Birth control counseling (V25 09) (Z30 09)   6  Chronic right hip pain (719 45,338 29) (M25 551,G89 29)   7  Contraception (V25 9) (Z30 9)   8  Depression (311) (F32 9)   9  Diarrhea (787 91) (R19 7)   10  Endometriosis (617 9) (N80 9)   11  Epigastric pain (789 06) (R10 13)   12  Fatigue (780 79) (R53 83)   13  Female pelvic pain (625 9) (R10 2)   14  H  pylori infection (041 86) (A04 8)   15  Irregular menses (626 4) (N92 6)   16  Irritable bowel syndrome with both constipation and diarrhea (564 1) (K58 2)   17  History of IUD contraception (V45 51) (Z97 5)   18  Loss of appetite (783 0) (R63 0)   19  History of Morbid or severe obesity due to excess calories (278 01) (E66 01)   20  Nausea (787 02) (R11 0)   21  Nausea and vomiting (787 01) (R11 2)   22  Paresthesia of both feet (782 0) (R20 2)   23  Postprandial RUQ pain (789 01) (R10 11)   24  Yeast infection (112 9) (B37 9)    Current Meds   1  Amoxicillin 500 MG Oral Capsule; TAKE 2 CAPSULE Twice daily; Therapy: 03BFH6927 to (Evaluate:53Aug2053)  Requested for: 34NFI4415; Last   Rx:70Gyp0479 Ordered   2  Clarithromycin 500 MG Oral Tablet; TAKE 1 TABLET TWICE DAILY UNTIL FINISHED; Therapy: 31SVA7829 to (Evaluate:02Jxy9687)  Requested for: 60ECA7809; Last   Rx:45Byw6714 Ordered   3  Dicyclomine HCl - 20 MG Oral Tablet; TAKE 1 TABLET EVERY 6 HOURS AS NEEDED; Therapy: 47HJD3851 to (Evaluate:26Wag6762)  Requested for: 50UOW7794; Last   Rx:87Rux1284 Ordered   4  NuvaRing 0 12-0 015 MG/24HR Vaginal Ring; INSERT 1 RING VAGINALLY FOR 3   WEEKS THEN 1 WEEK OFF; Therapy: 14XLH7117 to (Last Rx:28Mar2017)  Requested for: 19Xmk9398; Status:   ACTIVE - Renewal Denied Ordered   5  Ondansetron 4 MG Oral Tablet Disintegrating; May use 1-2 tablets every 6 hours, as   needed for nausea; Therapy: 82BML3495 to (Evaluate:90Ybf3838)  Requested for: 02CUN1139; Last   Rx:16Jun2017 Ordered   6  Protonix 40 MG Oral Tablet Delayed Release (Pantoprazole Sodium); TAKE 1 TABLET   DAILY; Therapy: 63MUT9667 to (Evaluate:59Jlk4158)  Requested for: 21SID0867; Last   Rx:70Jyo3422 Ordered   7  Sucralfate 1 GM Oral Tablet (Carafate); TAKE 1 TABLET 4 TIMES DAILY on an empty   stomach; Therapy: 20EOA8203 to (Evaluate:70Jkv9272)  Requested for: 40WGU1887; Last   Rx:16Jun2017 Ordered   8  TriLyte 420 GM Oral Solution Reconstituted; TAKE AS DIRECTED; Therapy: 35Bbk4130 to (Last Rx:91Ejv0322)  Requested for: 11Lud0798 Ordered    Allergies    1  No Known Drug Allergies    2  Food   3  FRUIT   4  Seafood   5   Shellfish    Plan  Yeast infection    · From  Fluconazole 150 MG Oral Tablet  TABS PO X 7  To Fluconazole 150 MG  Oral Tablet TABS PO X 7    Signatures   Electronically signed by : Randy Ralph, ; Aug 23 2017  4:32PM EST                       (Author)

## 2018-01-15 NOTE — MISCELLANEOUS
Message   Recorded as Task   Date: 10/31/2016 10:52 AM, Created By: [id number]   Task Name: Care Coordination   Assigned To: Maria Del Carmen Coon   Regarding Patient: Anali Fair, Status: In Progress   Comment:    Maria Del Carmen Coon - 31 Oct 2016 10:52 AM     TASK CREATED  pt has been bleeding for 3 weeks and is passing blood clots    I told her to use Ibuprofen, but do you want to give her  Lysteda? Piper Lightning   she is not supposed to remove her nuva ring until Friday    is that ok or hsould she remove it now?   Jae Goodman - 31 Oct 2016 12:31 PM     TASK REPLIED TO: Previously Assigned To Jae Goodman  Would suggest she waited until Friday to remove NuvaRing  Could consider Lysteda, but normally this is for heavy bleeding without metrorrhagia  Would suggest she hang in there and take ibuprofen and remove NuvaRing as scheduled  Hopefully she will then have which should be a normal period and then things will be reset   Maria Del Carmen Coon - 31 Oct 2016 3:27 PM     TASK IN PROGRESS        Active Problems    1  Anxiety disorder (300 00) (F41 9)   2  Asthma (493 90) (J45 909)   3  Bipolar depression (296 50) (F31 30)   4  Birth control counseling (V25 09) (Z30 9)   5  Contraception (V25 9) (Z30 9)   6  Depression (311) (F32 9)   7  Encounter for postpartum visit (V24 2) (Z39 2)   8  Endometriosis (617 9) (N80 9)   9  Female pelvic pain (625 9) (R10 2)   10  Fetal choroid plexus cysts affecting antepartum care of mother (655 03) (O35 8XX0)   11  Irregular menses (626 4) (N92 6)   12  History of IUD contraception (V45 51) (Z97 5)   13  History of Morbid or severe obesity due to excess calories (278 01) (E66 01)   14  Postprandial RUQ pain (789 01) (R10 11)    Current Meds   1  NuvaRing 0 12-0 015 MG/24HR Vaginal Ring; INSERT 1 RING VAGINALLY FOR 3   WEEKS THEN 1 WEEK OFF; Therapy: 74HVA4299 to ([address])  Requested for: 01LQW3524; Last   Rx:13Oct2016 Ordered    Allergies    1  No Known Drug Allergies    2  Food   3  FRUIT   4  Seafood   5   Shellfish    Signatures   Electronically signed by : Charleen Miranda, ; Nov 1 2016  2:22PM EST                       (Author)

## 2018-01-15 NOTE — PROGRESS NOTES
Active Problems    1  Asthma (493 90) (J45 909)   2  Currently pregnant (V22 2) (Z33 1)   3  Fetal choroid plexus cysts affecting antepartum care of mother (655 03) (O35 8XX0)   4  History of Morbid or severe obesity due to excess calories (278 01) (E66 01)    Current Meds    1  Iron Supplement TABS; Therapy: (Recorded:12Jan2016) to Recorded    Allergies    1  No Known Drug Allergies    2  Food   3  FRUIT   4  Seafood   5  Shellfish    Results/Data  93712 Abdominal Ultrasound OB Azucena Puffer:   Procedure: 00026- Ultrasound pregnant uterus real time with image documentation, limited one or more fetuses  The study was done today in the office  Indication: EDC gestational age 43w3d with an EDC of 2/21/2016 weeks  Exam indication: obesity  Findings:   Amniotic fluid volume: 43 3 + 17 8 + 21 6 + 12 7 = 95 4mm  Fetal heart beat: 144bpm    Fetal position: Vertex  Impression: Appropriate DUNCAN for fetal age  Future Appointments    Date/Time Provider Specialty Site   02/01/2016 11:15 AM Fernando Weems MD Obstetrics/Gynecology [facility]   02/15/2016 01:45 PM Fernando Weems MD Obstetrics/Gynecology [facility]   02/08/2016 11:00 AM Lyssa Kingsley Nurse Schedule  [facility]   02/22/2016 11:15 AM Lyssa Kingsley Nurse Schedule  [facility]   02/01/2016 10:45 AM Lyssa Teetee, Ultrasound [facility]   02/08/2016 10:30 AM Lyssa Teetee, Ultrasound [facility]   02/15/2016 01:00 PM Lyssa Teetee, Ultrasound [facility]   02/22/2016 10:30 AM Lyssa Teetee, Ultrasound [facility]     Signatures   Electronically signed by : Judy Hooks, ; Jan 27 2016  1:05PM EST                       (Author)    Electronically signed by :  Sal Jacques MD; Jan 27 2016  3:13PM EST

## 2018-01-15 NOTE — MISCELLANEOUS
Message   Recorded as Task   Date: 05/19/2016 01:23 PM, Created By: Kapil Arzate   Task Name: Call Back   Assigned To: Kapil Arzate   Regarding Patient: Hannah Ayers, Status: In Progress   Horace Campuzano - 19 May 2016 1:23 PM     TASK CREATED  Mirena iud to be ordered  Kapil Arzate - 19 May 2016 1:23 PM     TASK IN PROGRESS   Kapil Arzate - 24 May 2016 7:39 AM     TASK EDITED  Mirena iud covered under plans  Patient given an appt for insertion with DR Trever Chawla        Active Problems    1  Anxiety disorder (300 00) (F41 9)   2  Asthma (493 90) (J45 909)   3  Bipolar depression (296 50) (F31 30)   4  Birth control counseling (V25 09) (Z30 9)   5  Depression (311) (F32 9)   6  Encounter for postpartum visit (V24 2) (Z39 2)   7  Female pelvic pain (625 9) (R10 2)   8  Fetal choroid plexus cysts affecting antepartum care of mother (655 03) (O35 8XX0)   9  History of Morbid or severe obesity due to excess calories (278 01) (E66 01)   10  Postprandial RUQ pain (789 01) (R10 11)    Current Meds   1  Multi-Vitamin TABS; Therapy: (Recorded:05Apr2016) to Recorded    Allergies    1  No Known Drug Allergies    2  Food   3  FRUIT   4  Seafood   5  Shellfish    Signatures   Electronically signed by :  Kendal Patel, ; May 24 2016  7:39AM EST                       (Author)

## 2018-01-15 NOTE — MISCELLANEOUS
Message  Return to work or school:   Jose Faulkner is under my professional care   She was seen in my office on 08/18/2017             Signatures   Electronically signed by : Viki Malagon, ; Aug 18 2017  3:47PM EST                       (Author)

## 2018-01-15 NOTE — RESULT NOTES
Verified Results  (1) CBC/PLT/DIFF 80OHP3152 10:35AM Gilford Scarpa Order Number: [id number]     Order Number: [id number]     Test Name Result Flag Reference   WBC COUNT 5 79 Thousand/uL  4 31-10 16   RBC COUNT 4 72 Million/uL  3 81-5 12   HEMOGLOBIN 13 2 g/dL  11 5-15 4   HEMATOCRIT 40 5 %  34 8-46  1   MCV 86 fL  82-98   MCH 28 0 pg  26 8-34 3   MCHC 32 6 g/dL  31 4-37 4   RDW 13 6 %  11 6-15 1   MPV 11 0 fL  8 9-12 7   PLATELET COUNT 940 Thousands/uL  149-390   NEUTROPHILS RELATIVE PERCENT 63 %  43-75   LYMPHOCYTES RELATIVE PERCENT 24 %  14-44   MONOCYTES RELATIVE PERCENT 8 %  4-12   EOSINOPHILS RELATIVE PERCENT 5 %  0-6   BASOPHILS RELATIVE PERCENT 0 %  0-1   NEUTROPHILS ABSOLUTE COUNT 3 66 Thousands/µL  1 85-7 62   LYMPHOCYTES ABSOLUTE COUNT 1 39 Thousands/µL  0 60-4 47   MONOCYTES ABSOLUTE COUNT 0 47 Thousand/µL  0 17-1 22   EOSINOPHILS ABSOLUTE COUNT 0 26 Thousand/µL  0 00-0 61   BASOPHILS ABSOLUTE COUNT 0 01 Thousands/µL  0 00-0 10     (1) COMPREHENSIVE METABOLIC PANEL 77WOX5661 75:19RF Gilford Scarpa Order Number: [id number]      National Kidney Disease Education Program recommendations are as follows:  GFR calculation is accurate only with a steady state creatinine  Chronic Kidney disease less than 60 ml/min/1 73 sq  meters  Kidney failure less than 15 ml/min/1 73 sq  meters  Test Name Result Flag Reference   GLUCOSE,RANDM 91 mg/dL     If the patient is fasting, the ADA then defines impaired fasting glucose as > 100 mg/dL and diabetes as > or equal to 123 mg/dL     SODIUM 142 mmol/L  136-145   POTASSIUM 4 1 mmol/L  3 5-5 3   CHLORIDE 104 mmol/L  100-108   CARBON DIOXIDE 30 mmol/L  21-32   ANION GAP (CALC) 8 mmol/L  4-13   BLOOD UREA NITROGEN 15 mg/dL  5-25   CREATININE 0 78 mg/dL  0 60-1 30   Standardized to IDMS reference method   CALCIUM 8 7 mg/dL  8 3-10 1   BILI, TOTAL 0 44 mg/dL  0 20-1 00   ALK PHOSPHATAS 115 U/L     ALT (SGPT) 34 U/L  12-78   AST(SGOT) 15 U/L  5-45 ALBUMIN 3 4 g/dL L 3 5-5 0   TOTAL PROTEIN 7 5 g/dL  6 4-8 2   eGFR Non-African American      >60 0 ml/min/1 73sq m     (1) LIPASE 25Mar2016 10:35AM Localler Order Number: [id number]     Test Name Result Flag Reference   LIPASE 107 u/L       [place] U  23  [id number] 10:19AM Localler Order Number: [id number]     Test Name Result Flag Reference   US GALLBLADDER (Report)     RIGHT UPPER QUADRANT ULTRASOUND     INDICATION: Intermittent epigastric pain for 2 weeks, nausea and vomiting        COMPARISON: Ultrasound 9/20/2010     TECHNIQUE:  Real-time ultrasound of the right upper quadrant was performed with a curvilinear transducer with both volumetric sweeps and still imaging techniques  FINDINGS:     PANCREAS: Visualized portions of the pancreas are within normal limits  AORTA AND IVC: Visualized portions are normal for patient age  LIVER:   Size: Within normal range  The liver measures 16 6 cm in the midclavicular line  Contour: Surface contour is smooth  Parenchyma: Echogenicity and echotexture are within normal limits  No evidence of suspicious mass  The main portal vein is patent and hepatopetal       BILIARY:   The gallbladder is normal in caliber  No wall thickening or pericholecystic fluid  No stones or sludge identified  No sonographic Santiago's sign  No intrahepatic biliary dilatation  CBD measures 6 mm  No choledocholithiasis  KIDNEY:    Right kidney measures 12 1 x 4 5 cm  Within normal limits  ASCITES:  None         IMPRESSION:     Normal        Workstation performed: [id number]     Signed by:   Serafin Nugent DO   3/25/16

## 2018-01-15 NOTE — PROGRESS NOTES
Active Problems    1  Asthma (493 90) (J45 909)   2  Currently pregnant (V22 2) (Z33 1)   3  Fetal choroid plexus cysts affecting antepartum care of mother (655 03) (O35 8XX0)   4  History of Morbid or severe obesity due to excess calories (278 01) (E66 01)    Current Meds    1  Iron Supplement TABS; Therapy: (Recorded:12Jan2016) to Recorded    Allergies    1  No Known Drug Allergies    2  Food   3  FRUIT   4  Seafood   5  Shellfish    Results/Data  46910 Abdominal Ultrasound OB Rehana Sauce:   Procedure: 33562- Ultrasound pregnant uterus real time with image documentation, limited one or more fetuses  The study was done today in the office  Indication: EDC gestational age 32w1d with an EDC of 2/21/2016 weeks  Exam indication: obesity  Findings:   Amniotic fluid volume: 61 1 + 26 7 + 50 9 + 34 3 = 173 0mm  Fetal heart beat: 163bpm    Fetal position: vertex  Impression: Appropriate DUNCAN for fetal age  Future Appointments    Date/Time Provider Specialty Site   01/25/2016 01:45 PM JOSH Oakley   Obstetrics/Gynecology [facility]   02/01/2016 11:15 AM Alisa Foster MD Obstetrics/Gynecology [facility]   02/15/2016 01:45 PM Alisa Foster MD Obstetrics/Gynecology [facility]   02/08/2016 11:00 AM Etha Moulding, Nurse Schedule  [facility]   02/22/2016 11:15 AM Etha Moulding, Nurse Schedule  [facility]   01/25/2016 01:00 PM Etha Moulding, Ultrasound [facility]   02/01/2016 10:45 AM [facility]   02/08/2016 10:30 AM Etha Moulding, Ultrasound Schedul  [facility]   02/15/2016 01:00 PM Etha Moulding, Ultrasound [facility]   02/22/2016 10:30 AM Etha Moulding, Ultrasound [facility]     Signatures   Electronically signed by : Michelle Lowe, ; Jan 19 2016  2:08PM EST                       (Author)    Electronically signed by : JOSH Zazueta ; Jan 19 2016  3:36PM EST

## 2018-01-16 NOTE — MISCELLANEOUS
Message   Date: 09 Feb 2016 7:57 AM EST, Recorded By: Nick Jerez For: Oriana Gone: Jasmyn Stafford, Self   Phone: [phone] [facility]), [phone] (Work)   Reason: Medical Complaint   OB patient called c/o contx since last night 2230  Now 5 mins apart, no ROM, FM WNL, Pt states moderate pain with contx  Dr Nikki Ball informed, sent pt to L&D, called L&D to inform  Active Problems    1  Asthma (493 90) (J45 909)   2  Currently pregnant (V22 2) (Z33 1)   3  Fetal choroid plexus cysts affecting antepartum care of mother (655 03) (O35 8XX0)   4  History of Morbid or severe obesity due to excess calories (278 01) (E66 01)    Current Meds   1  Iron Supplement TABS; Therapy: (Recorded:12Jan2016) to Recorded    Allergies    1  No Known Drug Allergies    2  Food   3  FRUIT   4  Seafood   5   Shellfish    Signatures   Electronically signed by : Deepa Mccall, ; Feb 9 2016  8:04AM EST                       (Author)

## 2018-01-16 NOTE — MISCELLANEOUS
Provider Comments  Provider Comments:   Pt didn't show to her appointment at 0800        Signatures   Electronically signed by : Louann Bamberger, PA; Mar  8 2017  8:21AM EST                       (Author)

## 2018-01-17 NOTE — PROGRESS NOTES
Active Problems    1  Asthma (493 90) (J45 909)   2  Currently pregnant (V22 2) (Z33 1)   3  Fetal choroid plexus cysts affecting antepartum care of mother (655 03) (O35 8XX0)   4  History of Morbid or severe obesity due to excess calories (278 01) (E66 01)    Current Meds    1  No Reported Medications  Requested for: 28LPB5139 Recorded    Allergies    1  No Known Drug Allergies    2  Food   3  FRUIT   4  Seafood   5  Shellfish    Results/Data  07502 Abdominal Ultrasound OB Fredderick Face:   Procedure: 09201- Ultrasound pregnant uterus real time with image documentation, limited one or more fetuses  The study was done today in the office  Indication: EDC gestational age 35w2d with an EDC of 2/21/2016 weeks  Exam indication: obesity  Findings:   Amniotic fluid volume: 31 8 + + 22 8 + 45 8 + 45 8 = 146 2mm  Fetal heart beat: 142bpm    Fetal position: Vertex  Impression: Appropriate DUNCAN for fetal age  Future Appointments    Date/Time Provider Specialty Site   01/19/2016 02:45 PM JOSH Mcnamara  Obstetrics/Gynecology [facility]   02/02/2016 03:15 PM JOSH Mcnamara  Obstetrics/Gynecology [facility]   02/09/2016 03:15 PM JOSH Mcnamara  Obstetrics/Gynecology [facility]   02/16/2016 02:45 PM JOSH Mcnamara   Obstetrics/Gynecology [facility]   01/26/2016 04:00 PM Bibi Christiansen MD Obstetrics/Gynecology [facility]   02/23/2016 03:30 PM Bibi Christiansen MD Obstetrics/Gynecology [facility]   01/19/2016 02:00 PM Carlito Toussaint Ultrasound Schedul  [facility]   01/26/2016 03:00 PM Carlito Toussaint Ultrasound [place]  [facility]   02/02/2016 02:45 PM Carlito Toussaint Ultrasound [place]  [facility]   02/09/2016 02:45 PM Carlito Toussaint Ultrasound Schedul  [facility]   02/16/2016 02:00 PM Carlito Toussaint Ultrasound [place]  [facility]   02/23/2016 02:30 PM Carlito Toussaint Ultrasound [facility]     Signatures   Electronically signed by : Aliyah Pal, ; Jan 12 2016  2:45PM EST                       (Author)    Electronically signed by :  Lisseth Bartlett MD; Jan 12 2016  4:12PM EST

## 2018-01-17 NOTE — PROGRESS NOTES
Active Problems    1  Asthma (493 90) (J45 909)   2  Currently pregnant (V22 2) (Z33 1)   3  Fetal choroid plexus cysts affecting antepartum care of mother (655 03) (O35 8XX0)   4  History of Morbid or severe obesity due to excess calories (278 01) (E66 01)    Current Meds    1  Iron Supplement TABS; Therapy: (Recorded:12Jan2016) to Recorded    Allergies    1  No Known Drug Allergies    2  Food   3  FRUIT   4  Seafood   5  Shellfish    Results/Data  25312 Abdominal Ultrasound OB Gabriel Parkinson:   Procedure: 57632- Ultrasound pregnant uterus real time with image documentation, limited one or more fetuses  The study was done today in the office  Indication: EDC gestational age 44w3d with an EDC of 2/21/2016 weeks  Exam indication: obesity  Findings:   Amniotic fluid volume: 6 2 + 5 1 + 33 1 +20 4 = 120 9mm  Fetal position: vertex  Impression: Appropriate DUNCAN for fetal age        Signatures   Electronically signed by : Maribell Up, ; Feb 22 2016 10:36AM EST                       (Author)    Electronically signed by : JOSH Escamilla ; Feb 22 2016 12:26PM EST

## 2018-01-18 NOTE — MISCELLANEOUS
Message   Recorded as Task   Date: 10/11/2016 09:17 AM, Created By: Carlyn Nair   Task Name: Medical Complaint Callback   Assigned To: [facility]   Regarding Patient: Carlynn Baumgarten, Status: Active   CommentJanis Harrington - 11 Oct 2016 9:17 AM     TASK CREATED  Patient called to report her Mirena came out last night when she was removing a tampon  It was inserted on 6/2/16  She denies any pain  She has her MP now  She wants another one  How is this handled? [facility] - 11 Oct 2016 9:47 AM     TASK EDITED                 Spoke to patient, insurance will not cover another device since this was just done in 6/2016  Explained this to patient  I made her an appt tomorrow for a birth control discussion with Dr Fabiola Garcia  Active Problems    1  Anxiety disorder (300 00) (F41 9)   2  Asthma (493 90) (J45 909)   3  Bipolar depression (296 50) (F31 30)   4  Birth control counseling (V25 09) (Z30 9)   5  Depression (311) (F32 9)   6  Encounter for postpartum visit (V24 2) (Z39 2)   7  Endometriosis (617 9) (N80 9)   8  Female pelvic pain (625 9) (R10 2)   9  Fetal choroid plexus cysts affecting antepartum care of mother (655 03) (O35 8XX0)   10  Irregular menses (626 4) (N92 6)   11  IUD contraception (V45 51) (Z97 5)   12  History of Morbid or severe obesity due to excess calories (278 01) (E66 01)   13  Postprandial RUQ pain (789 01) (R10 11)    Current Meds   1  Mirena IUD; Therapy: (Recorded:50Aat9415) to Recorded    Allergies    1  No Known Drug Allergies    2  Food   3  FRUIT   4  Seafood   5  Shellfish    Signatures   Electronically signed by :  Ron Kelley, ; Oct 11 2016  9:47AM EST                       (Author)

## 2018-01-18 NOTE — MISCELLANEOUS
Message   Date: 12 Oct 2016 1:18 PM EST, Recorded By: Chas De La Torre For: Antonietta Duarte: Kermit Flaherty, Self   Phone: [phone] [facility]), [phone] (Work)   Reason: Medical Complaint   pt is bleeding heavy with blood clots    pt was supposed to have a birth control discussion, but needs to be evaluated for this pain and bleeding    pt will keep her appt today           Active Problems    1  Anxiety disorder (300 00) (F41 9)   2  Asthma (493 90) (J45 909)   3  Bipolar depression (296 50) (F31 30)   4  Birth control counseling (V25 09) (Z30 9)   5  Depression (311) (F32 9)   6  Encounter for postpartum visit (V24 2) (Z39 2)   7  Endometriosis (617 9) (N80 9)   8  Female pelvic pain (625 9) (R10 2)   9  Fetal choroid plexus cysts affecting antepartum care of mother (655 03) (O35 8XX0)   10  Irregular menses (626 4) (N92 6)   11  IUD contraception (V45 51) (Z97 5)   12  History of Morbid or severe obesity due to excess calories (278 01) (E66 01)   13  Postprandial RUQ pain (789 01) (R10 11)    Current Meds   1  Mirena IUD; Therapy: (Recorded:43Izx6982) to Recorded    Allergies    1  No Known Drug Allergies    2  Food   3  FRUIT   4  Seafood   5   Shellfish    Signatures   Electronically signed by : Sahra York, ; Oct 12 2016  1:19PM EST                       (Author)

## 2018-01-18 NOTE — MISCELLANEOUS
Dear Neel Bucio,  [address] [facility] office has attempted to contact you regarding results  Please give our office a call back at [phone]    Thank you,  [facility]      Electronically signed Monica Salgado   Jul 26 2017  1:32PM EST

## 2018-01-18 NOTE — MISCELLANEOUS
Message   Recorded as Task   Date: 07/31/2017 06:26 PM, Created By: System   Task Name: Rx Renew Request   Assigned To: Enma Presumyuliana   Regarding Patient: Denice Castellanos, Status: Active   Comment:    System - 31 Jul 2017 6:26 PM     PHARMACY: [place] [id number]  PATIENT: Della Roles  MEDICATION: [address] - 01 Aug 2017 12:20 PM     TASK REASSIGNED: Previously Assigned To Jae Goodman  patient was started on NuvaRing October 2016, never followed up for 3 month check  No-shows Ã? 2  Needs follow-up if she wishes to continue with this medication  Patient will schedule appointment  Active Problems    1  Abdominal pain (789 00) (R10 9)   2  Anxiety disorder (300 00) (F41 9)   3  Asthma (493 90) (J45 909)   4  Bipolar depression (296 50) (F31 30)   5  Birth control counseling (V25 09) (Z30 09)   6  Chronic right hip pain (719 45,338 29) (M25 551,G89 29)   7  Contraception (V25 9) (Z30 9)   8  Depression (311) (F32 9)   9  Diarrhea (787 91) (R19 7)   10  Endometriosis (617 9) (N80 9)   11  Epigastric pain (789 06) (R10 13)   12  Fatigue (780 79) (R53 83)   13  Female pelvic pain (625 9) (R10 2)   14  Irregular menses (626 4) (N92 6)   15  History of IUD contraception (V45 51) (Z97 5)   16  Loss of appetite (783 0) (R63 0)   17  History of Morbid or severe obesity due to excess calories (278 01) (E66 01)   18  Nausea (787 02) (R11 0)   19  Nausea and vomiting (787 01) (R11 2)   20  Paresthesia of both feet (782 0) (R20 2)   21  Postprandial RUQ pain (789 01) (R10 11)    Current Meds   1  Dicyclomine HCl - 20 MG Oral Tablet; TAKE 1 TABLET EVERY 6 HOURS AS NEEDED; Therapy: 61QQC4511 to (Evaluate:73Gjv0830)  Requested for: 84HSC2368; Last   Rx:30Tmz0522 Ordered   2  NuvaRing 0 12-0 015 MG/24HR Vaginal Ring; INSERT 1 RING VAGINALLY FOR 3   WEEKS THEN 1 WEEK OFF;    Therapy: 57GDN1630 to (Last Rx:28Mar2017)  Requested for: 06HSN9957; Status: ACTIVE   - Renewal Denied Ordered 3  Ondansetron 4 MG Oral Tablet Disintegrating; May use 1-2 tablets every 6 hours, as   needed for nausea; Therapy: 84IVP4189 to (Evaluate:99Zke7999)  Requested for: 11AMU6741; Last   Rx:16Jun2017 Ordered   4  Sucralfate 1 GM Oral Tablet (Carafate); TAKE 1 TABLET 4 TIMES DAILY on an empty   stomach; Therapy: 97IJK3043 to (Evaluate:05Yls5810)  Requested for: 07JDU2187; Last   Rx:16Jun2017 Ordered   5  TriLyte 420 GM Oral Solution Reconstituted; TAKE AS DIRECTED; Therapy: 59Wvf1720 to (Last Rx:94Teu2744)  Requested for: 24Vbw1905; Status:   ACTIVE - Retrospective By Protocol Authorization Ordered    Allergies    1  No Known Drug Allergies    2  Food   3  FRUIT   4  Seafood   5   Shellfish    Signatures   Electronically signed by : Noel Contreras, ; Aug  1 2017  3:43PM EST                       (Author)

## 2018-04-21 ENCOUNTER — APPOINTMENT (EMERGENCY)
Dept: ULTRASOUND IMAGING | Facility: HOSPITAL | Age: 28
End: 2018-04-21
Payer: COMMERCIAL

## 2018-04-21 ENCOUNTER — HOSPITAL ENCOUNTER (EMERGENCY)
Facility: HOSPITAL | Age: 28
Discharge: HOME | End: 2018-04-21
Attending: EMERGENCY MEDICINE | Admitting: EMERGENCY MEDICINE
Payer: COMMERCIAL

## 2018-04-21 VITALS
WEIGHT: 254.4 LBS | BODY MASS INDEX: 43.67 KG/M2 | SYSTOLIC BLOOD PRESSURE: 119 MMHG | RESPIRATION RATE: 18 BRPM | TEMPERATURE: 98.1 F | HEART RATE: 72 BPM | DIASTOLIC BLOOD PRESSURE: 78 MMHG | OXYGEN SATURATION: 100 %

## 2018-04-21 DIAGNOSIS — Z3A.01 LESS THAN 8 WEEKS GESTATION OF PREGNANCY: ICD-10-CM

## 2018-04-21 DIAGNOSIS — Z72.0 TOBACCO USE: ICD-10-CM

## 2018-04-21 DIAGNOSIS — R10.9 ABDOMINAL CRAMPING: ICD-10-CM

## 2018-04-21 DIAGNOSIS — N39.0 UTI (URINARY TRACT INFECTION): Primary | ICD-10-CM

## 2018-04-21 LAB
ABO GROUP BLD: NORMAL
ALBUMIN SERPL BCP-MCNC: 3.9 G/DL (ref 3.5–5)
ALP SERPL-CCNC: 75 U/L (ref 46–116)
ALT SERPL W P-5'-P-CCNC: 40 U/L (ref 12–78)
ANION GAP SERPL CALCULATED.3IONS-SCNC: 9 MMOL/L (ref 4–13)
APTT PPP: 28 SECONDS (ref 23–35)
AST SERPL W P-5'-P-CCNC: 20 U/L (ref 5–45)
B-HCG SERPL-ACNC: 7316.5 MIU/ML
BACTERIA UR QL AUTO: ABNORMAL /HPF
BASOPHILS # BLD AUTO: 0.02 THOUSANDS/ΜL (ref 0–0.1)
BASOPHILS NFR BLD AUTO: 0 % (ref 0–1)
BILIRUB SERPL-MCNC: 0.28 MG/DL (ref 0.2–1)
BILIRUB UR QL STRIP: NEGATIVE
BLD GP AB SCN SERPL QL: NEGATIVE
BUN SERPL-MCNC: 13 MG/DL (ref 5–25)
CALCIUM SERPL-MCNC: 8.8 MG/DL (ref 8.3–10.1)
CHLORIDE SERPL-SCNC: 103 MMOL/L (ref 100–108)
CLARITY UR: CLEAR
CO2 SERPL-SCNC: 26 MMOL/L (ref 21–32)
COLOR UR: YELLOW
CREAT SERPL-MCNC: 0.72 MG/DL (ref 0.6–1.3)
EOSINOPHIL # BLD AUTO: 0.28 THOUSAND/ΜL (ref 0–0.61)
EOSINOPHIL NFR BLD AUTO: 3 % (ref 0–6)
ERYTHROCYTE [DISTWIDTH] IN BLOOD BY AUTOMATED COUNT: 13.5 % (ref 11.6–15.1)
GFR SERPL CREATININE-BSD FRML MDRD: 117 ML/MIN/1.73SQ M
GLUCOSE SERPL-MCNC: 91 MG/DL (ref 65–140)
GLUCOSE UR STRIP-MCNC: NEGATIVE MG/DL
HCT VFR BLD AUTO: 37.7 % (ref 34.8–46.1)
HGB BLD-MCNC: 13.1 G/DL (ref 11.5–15.4)
HGB UR QL STRIP.AUTO: ABNORMAL
INR PPP: 1.01 (ref 0.86–1.16)
KETONES UR STRIP-MCNC: NEGATIVE MG/DL
LEUKOCYTE ESTERASE UR QL STRIP: ABNORMAL
LYMPHOCYTES # BLD AUTO: 2.79 THOUSANDS/ΜL (ref 0.6–4.47)
LYMPHOCYTES NFR BLD AUTO: 27 % (ref 14–44)
MCH RBC QN AUTO: 30.9 PG (ref 26.8–34.3)
MCHC RBC AUTO-ENTMCNC: 34.7 G/DL (ref 31.4–37.4)
MCV RBC AUTO: 89 FL (ref 82–98)
MONOCYTES # BLD AUTO: 0.69 THOUSAND/ΜL (ref 0.17–1.22)
MONOCYTES NFR BLD AUTO: 7 % (ref 4–12)
NEUTROPHILS # BLD AUTO: 6.66 THOUSANDS/ΜL (ref 1.85–7.62)
NEUTS SEG NFR BLD AUTO: 63 % (ref 43–75)
NITRITE UR QL STRIP: NEGATIVE
NON-SQ EPI CELLS URNS QL MICRO: ABNORMAL /HPF
NRBC BLD AUTO-RTO: 0 /100 WBCS
PH UR STRIP.AUTO: 6 [PH] (ref 4.5–8)
PLATELET # BLD AUTO: 227 THOUSANDS/UL (ref 149–390)
PMV BLD AUTO: 10.8 FL (ref 8.9–12.7)
POTASSIUM SERPL-SCNC: 3.5 MMOL/L (ref 3.5–5.3)
PROT SERPL-MCNC: 7.7 G/DL (ref 6.4–8.2)
PROT UR STRIP-MCNC: ABNORMAL MG/DL
PROTHROMBIN TIME: 13.3 SECONDS (ref 12.1–14.4)
RBC # BLD AUTO: 4.24 MILLION/UL (ref 3.81–5.12)
RBC #/AREA URNS AUTO: ABNORMAL /HPF
RH BLD: POSITIVE
SODIUM SERPL-SCNC: 138 MMOL/L (ref 136–145)
SP GR UR STRIP.AUTO: 1.02 (ref 1–1.03)
SPECIMEN EXPIRATION DATE: NORMAL
UROBILINOGEN UR QL STRIP.AUTO: 0.2 E.U./DL
WBC # BLD AUTO: 10.44 THOUSAND/UL (ref 4.31–10.16)
WBC #/AREA URNS AUTO: ABNORMAL /HPF

## 2018-04-21 PROCEDURE — 81002 URINALYSIS NONAUTO W/O SCOPE: CPT | Performed by: NURSE PRACTITIONER

## 2018-04-21 PROCEDURE — 86850 RBC ANTIBODY SCREEN: CPT | Performed by: NURSE PRACTITIONER

## 2018-04-21 PROCEDURE — 99284 EMERGENCY DEPT VISIT MOD MDM: CPT

## 2018-04-21 PROCEDURE — 84702 CHORIONIC GONADOTROPIN TEST: CPT | Performed by: NURSE PRACTITIONER

## 2018-04-21 PROCEDURE — 76815 OB US LIMITED FETUS(S): CPT

## 2018-04-21 PROCEDURE — 85610 PROTHROMBIN TIME: CPT | Performed by: NURSE PRACTITIONER

## 2018-04-21 PROCEDURE — 86900 BLOOD TYPING SEROLOGIC ABO: CPT | Performed by: NURSE PRACTITIONER

## 2018-04-21 PROCEDURE — 85025 COMPLETE CBC W/AUTO DIFF WBC: CPT | Performed by: NURSE PRACTITIONER

## 2018-04-21 PROCEDURE — 36415 COLL VENOUS BLD VENIPUNCTURE: CPT | Performed by: NURSE PRACTITIONER

## 2018-04-21 PROCEDURE — 80053 COMPREHEN METABOLIC PANEL: CPT | Performed by: NURSE PRACTITIONER

## 2018-04-21 PROCEDURE — 81001 URINALYSIS AUTO W/SCOPE: CPT

## 2018-04-21 PROCEDURE — 85730 THROMBOPLASTIN TIME PARTIAL: CPT | Performed by: NURSE PRACTITIONER

## 2018-04-21 PROCEDURE — 86901 BLOOD TYPING SEROLOGIC RH(D): CPT | Performed by: NURSE PRACTITIONER

## 2018-04-21 RX ORDER — CEPHALEXIN 500 MG/1
500 CAPSULE ORAL EVERY 12 HOURS SCHEDULED
Qty: 14 CAPSULE | Refills: 0 | Status: SHIPPED | OUTPATIENT
Start: 2018-04-21 | End: 2018-04-28

## 2018-04-21 RX ORDER — CEPHALEXIN 250 MG/1
500 CAPSULE ORAL ONCE
Status: COMPLETED | OUTPATIENT
Start: 2018-04-21 | End: 2018-04-21

## 2018-04-21 RX ADMIN — CEPHALEXIN 500 MG: 250 CAPSULE ORAL at 22:51

## 2018-04-21 NOTE — ED PROVIDER NOTES
History  Chief Complaint   Patient presents with    Pelvic Pain - Pregnant     Patient presents complaining of pelvic pain and vaginal bleeding since yesterday  Reports that she took positive home pregnancy test last week,   Denies passing any blood clots, reports amount is similar to period  This is an obese 24year old female who states she took a pregnancy test last week and it was positive  She states she has been having abdominal pain for the last 3 days with worsening suprapubic pain on Thursday  She denies calling [address] [employer] ([facility])  She states she had some bleeding last night and this morning, denies any now  Denies recent sexual activity  She states she has been going to the gym a lot and is concerned about the pregnancy  She is unable to state her LMP as she has had her menses   Ab 1    Pt asks "how long is this going to take"? I have explained to pt that she needs a urine, labs, US and if results are abnormal it may take longer  I estimated to her about 3-4 hours  Pt states she doesn't know if she wants to stay that long  She states she will let the nurse know  History provided by:  Patient and medical records   used: No        Prior to Admission Medications   Prescriptions Last Dose Informant Patient Reported? Taking?   dicyclomine (BENTYL) 20 mg tablet   Yes No   Sig: Take 20 mg by mouth every 6 (six) hours   etonogestrel-ethinyl estradiol (NUVARING) 0 12-0 015 MG/24HR vaginal ring   Yes No   Sig: Insert 1 each into the vagina every 28 days Insert vaginally and leave in place for 3 consecutive weeks, then remove for 1 week     ondansetron (ZOFRAN) 4 mg tablet   No No   Sig: Take 1 tablet by mouth every 6 (six) hours   sucralfate (CARAFATE) 1 g tablet   No No   Sig: Take 1 tablet by mouth 4 (four) times a day      Facility-Administered Medications: None       Past Medical History:   Diagnosis Date    Abdominal pain     Anxiety     Asthma     Bipolar disorder [facility])     Chronic pain disorder     right hip    Depression     Diarrhea     Endometrial disorder 12/2012    Epigastric pain     Fatigue     Morbid obesity (HCC)     Nausea and vomiting        Past Surgical History:   Procedure Laterality Date    DIAGNOSTIC LAPAROSCOPY      DIAGNOSTIC LAPAROSCOPY      [place] COLONOSCOPY FLX DX W/COLLJ SPEC WHEN PFRMD N/A 8/4/2017    Procedure: EGD with bx AND COLONOSCOPY with bx;  Surgeon: Alysha Yusuf MD;  Location: [facility]; Service: Gastroenterology       History reviewed  No pertinent family history  I have reviewed and agree with the history as documented  Social History   Substance Use Topics    Smoking status: Current Every Day Smoker     Packs/day: 0 20    Smokeless tobacco: Never Used    Alcohol use Yes      Comment: Socially        Review of Systems   Constitutional: Negative  HENT: Negative  Eyes: Negative  Respiratory: Negative  Cardiovascular: Negative  Gastrointestinal: Positive for abdominal pain  Endocrine: Negative  Genitourinary: Positive for vaginal bleeding  Musculoskeletal: Negative  Skin: Negative  Allergic/Immunologic: Negative  Neurological: Negative  Hematological: Negative  Psychiatric/Behavioral: Negative  Physical Exam  ED Triage Vitals [04/21/18 1918]   Temperature Pulse Respirations Blood Pressure SpO2   98 1 °F (36 7 °C) 93 17 126/80 100 %      Temp Source Heart Rate Source Patient Position - Orthostatic VS BP Location FiO2 (%)   Oral Monitor Sitting Right arm --      Pain Score       7           Orthostatic Vital Signs  Vitals:    04/21/18 1918 04/21/18 2045   BP: 126/80 132/74   Pulse: 93 88   Patient Position - Orthostatic VS: Sitting Lying       Physical Exam   Constitutional: She is oriented to person, place, and time  She appears well-developed and well-nourished  No distress  HENT:   Head: Normocephalic and atraumatic     Eyes: EOM are normal  Pupils are equal, round, and reactive to light    Neck: Normal range of motion  Neck supple  Cardiovascular: Normal rate, regular rhythm and normal heart sounds  Pulmonary/Chest: Effort normal and breath sounds normal    Abdominal: Soft  Bowel sounds are normal  She exhibits no distension  There is no tenderness  No pain with deep palpation    Musculoskeletal: Normal range of motion  Neurological: She is alert and oriented to person, place, and time  Skin: Skin is warm and dry  Capillary refill takes less than 2 seconds  She is not diaphoretic  Psychiatric: She has a normal mood and affect  Her behavior is normal    Nursing note and vitals reviewed        ED Medications  Medications   cephalexin (KEFLEX) capsule 500 mg (not administered)       Diagnostic Studies  Results Reviewed     Procedure Component Value Units Date/Time    Quantitative hCG [[id number]]  (Abnormal) Collected:  04/21/18 2000    Lab Status:  Final result Specimen:  Blood from Arm, Right Updated:  04/21/18 2050     HCG, Quant 7,316 5 (H) mIU/mL     Narrative:          Expected Ranges:     Approximate               Approximate HCG  Gestation age          Concentration ( mIU/mL)  _____________          ______________________   Rosa Vital                      HCG values  0 2-1                       5-50  1-2                           2-3                         100-5000  3-4                         500-92352  4-5                         1000-31530  5-6                         [id number]  6-8                         [id number]  8-12                        63450-479335    Urine Microscopic [[id number]]  (Abnormal) Collected:  04/21/18 2004    Lab Status:  Final result Specimen:  Urine from Urine, Clean Catch Updated:  04/21/18 2030     RBC, UA 1-2 (A) /hpf      WBC, UA 4-10 (A) /hpf      Epithelial Cells Occasional /hpf      Bacteria, UA Moderate (A) /hpf     Comprehensive metabolic panel [[id number]] Collected:  04/21/18 2000    Lab Status:  Final result Specimen:  Blood from Arm, Right Updated:  04/21/18 2026     Sodium 138 mmol/L      Potassium 3 5 mmol/L      Chloride 103 mmol/L      CO2 26 mmol/L      Anion Gap 9 mmol/L      BUN 13 mg/dL      Creatinine 0 72 mg/dL      Glucose 91 mg/dL      Calcium 8 8 mg/dL      AST 20 U/L      ALT 40 U/L      Alkaline Phosphatase 75 U/L      Total Protein 7 7 g/dL      Albumin 3 9 g/dL      Total Bilirubin 0 28 mg/dL      eGFR 117 ml/min/1 73sq m     Narrative:         National Kidney Disease Education Program recommendations are as follows:  GFR calculation is accurate only with a steady state creatinine  Chronic Kidney disease less than 60 ml/min/1 73 sq  meters  Kidney failure less than 15 ml/min/1 73 sq  meters      Protime-INR [[id number]]  (Normal) Collected:  04/21/18 2000    Lab Status:  Final result Specimen:  Blood from Arm, Right Updated:  04/21/18 2021     Protime 13 3 seconds      INR 1 01    APTT [[id number]]  (Normal) Collected:  04/21/18 2000    Lab Status:  Final result Specimen:  Blood from Arm, Right Updated:  04/21/18 2021     PTT 28 seconds     CBC and differential [[id number]]  (Abnormal) Collected:  04/21/18 2000    Lab Status:  Final result Specimen:  Blood from Arm, Right Updated:  04/21/18 2006     WBC 10 44 (H) Thousand/uL      RBC 4 24 Million/uL      Hemoglobin 13 1 g/dL      Hematocrit 37 7 %      MCV 89 fL      MCH 30 9 pg      MCHC 34 7 g/dL      RDW 13 5 %      MPV 10 8 fL      Platelets 608 Thousands/uL      nRBC 0 /100 WBCs      Neutrophils Relative 63 %      Lymphocytes Relative 27 %      Monocytes Relative 7 %      Eosinophils Relative 3 %      Basophils Relative 0 %      Neutrophils Absolute 6 66 Thousands/µL      Lymphocytes Absolute 2 79 Thousands/µL      Monocytes Absolute 0 69 Thousand/µL      Eosinophils Absolute 0 28 Thousand/µL      Basophils Absolute 0 02 Thousands/µL     POCT urinalysis dipstick [[id number]]  (Abnormal) Resulted:  04/21/18 2004    Lab Status:  Final result Specimen:  Urine Updated:  04/21/18 2004 ED Urine Macroscopic [[id number]]  (Abnormal) Collected:  04/21/18 2004    Lab Status:  Final result Specimen:  Urine Updated:  04/21/18 2004     Color, UA Yellow     Clarity, UA Clear     pH, UA 6 0     Leukocytes, UA Trace (A)     Nitrite, UA Negative     Protein, UA Trace (A) mg/dl      Glucose, UA Negative mg/dl      Ketones, UA Negative mg/dl      Urobilinogen, UA 0 2 E U /dl      Bilirubin, UA Negative     Blood, UA Trace (A)     Specific [employer], UA 1 025    Narrative:       CLINITEK RESULT                 US OB pregnancy limited with transvaginal   Final Result by Aydin Cruz MD (04/21 2240)      Single intrauterine gestational sac with yolk sac  Subchorionic hemorrhage measuring 1 9 x 0 6 x 1 9 cm is noted  Workstation performed: [id number]                    Procedures  Procedures       Phone Contacts  ED Phone Contact    ED Course  ED Course as of Apr 21 2251   Sat Apr 21, 2018   [phone] 5 Beta quant  Will order TV US r/o ectopic  UA moderate bacteria WBC 4-10 will treat since pregnant for UTI       2110 Labs reviewed and discussed with pt  Waiting on TV US      2126 RN has called US in       2241 IMPRESSION:     Single intrauterine gestational sac with yolk sac  Subchorionic hemorrhage measuring 1 9 x 0 6 x 1 9 cm is noted  2244 Pt is to follow up with her OB on Monday  Pt given strict return precautions will treat with keflex for UTI  Pt verbalizes understanding of d/c instructions and follow up                                   MDM  Number of Diagnoses or Management Options  Diagnosis management comments: Differential diagnosis:  Ectopic pregnancy  Pregnancy  Miscarriage  DUB with pregnancy  STD    Plan:  Labs  Urine   TV US  - this will be determined after pt decides whether she wants to stay for work up        Amount and/or Complexity of Data Reviewed  Review and summarize past medical records: yes      CritCare Time    Disposition  Final diagnoses:   Less than 8 weeks gestation of pregnancy   UTI (urinary tract infection)   Abdominal cramping   Tobacco use     Time reflects when diagnosis was documented in both MDM as applicable and the Disposition within this note     Time User Action Codes Description Comment    4/21/2018  9:39 PM Vinod Mattaponi Add [Z3A 01] Less than 8 weeks gestation of pregnancy     4/21/2018  9:39 PM Vinod Justen Add [N39 0] UTI (urinary tract infection)     4/21/2018  9:39 PM Vinod Mattaponi Add [R10 9] Abdominal cramping     4/21/2018 10:35 PM Vinod Mattaponi Add [Z72 0] Tobacco use     4/21/2018 10:35 PM Rozelle Stacey [Z3A 01] Less than 8 weeks gestation of pregnancy     4/21/2018 10:35 PM Vinod Justen Modify [N39 0] UTI (urinary tract infection)       ED Disposition     ED Disposition Condition Comment    Discharge  Gosia Manning discharge to home/self care  Condition at discharge: Good        Follow-up Information     Follow up With Specialties Details Why Contact Info Additional Information    [facility] Call in 1 day  [address] Emergency Department Emergency Medicine  If symptoms worsen [address] [place] [address]  [phone] [facility], [address] [place] [address] ROBERTO Tello [facility], Physician Assistant Schedule an appointment as soon as possible for a visit in 2 days  [address] [phone]           Patient's Medications   Discharge Prescriptions    CEPHALEXIN (KEFLEX) 500 MG CAPSULE    Take 1 capsule (500 mg total) by mouth every 12 (twelve) hours for 7 days       Start Date: 4/21/2018 End Date: 4/28/2018       Order Dose: 500 mg       Quantity: 14 capsule    Refills: 0     No discharge procedures on file      ED Provider  Electronically Signed by           Jenita Bernheim, [address]  04/21/18 6533

## 2018-04-22 NOTE — DISCHARGE INSTRUCTIONS
You have been diagnosed with positive pregnancy  You are to see your OB on Monday  Return to ED if symptoms worsen  You have been diagnosed with a UTI and started on keflex - take as prescribed  Increase water intake  Abdominal Pain, Ambulatory Care   GENERAL INFORMATION:   Abdominal pain  can be dull, achy, or sharp  You may have pain in one area of your abdomen, or in your entire abdomen  Your pain may be caused by constipation, food sensitivity or poisoning, infection, or a blockage  Abdominal pain can also be caused by a hernia, appendicitis, or an ulcer  The cause of your abdominal pain may be unknown  Seek immediate care for the following symptoms:   · New chest pain or shortness of breath    · Pulsing pain in your upper abdomen or lower back that suddenly becomes constant    · Pain in the right lower abdominal area that worsens with movement    · Fever over 100 4°F (38°C) or shaking chills    · Vomiting and you cannot keep food or fluids down    · Pain does not improve or gets worse over the next 8 to 12 hours    · Blood in your vomit or bowel movements, or they look black and tarry    · Skin or the whites of your eyes turn yellow    · Large amount of vaginal bleeding that is not your monthly period  Treatment for abdominal pain  may include medicine to calm your stomach, prevent vomiting, or decrease pain  Follow up with your healthcare provider as directed:  Write down your questions so you remember to ask them during your visits  CARE AGREEMENT:   You have the right to help plan your care  Learn about your health condition and how it may be treated  Discuss treatment options with your caregivers to decide what care you want to receive  You always have the right to refuse treatment  The above information is an  only  It is not intended as medical advice for individual conditions or treatments   Talk to your doctor, nurse or pharmacist before following any medical regimen to see if it is safe and effective for you  [address] is for End User's use only and may not be sold, redistributed or otherwise used for commercial purposes  All illustrations and images included in CareNotes® are the copyrighted property of A D A M , Inc  or Familia Mcgrath  Abdominal Pain in Pregnancy   WHAT YOU NEED TO KNOW:   Abdominal pain during pregnancy is common  Some of the causes include heartburn, constipation, gas, false labor, and round ligament pain  Round ligament pain is caused by stretching of the ligaments that support your uterus  Abdominal pain may be caused by a health problem, such as a stomach virus or appendicitis (inflammation of the appendix)  The pain may also be caused by a problem with your pregnancy, such as a threatened miscarriage or  labor  DISCHARGE INSTRUCTIONS:   Follow up with your obstetrician within 3 days:  Write down your questions so you remember to ask them during your visits  Self-care:   · Rest may help to relieve round ligament pain  Ask your healthcare provider about other ways to relieve this pain, such as a supportive belt or pregnancy exercises  · Use a heating pad set to the lowest setting or a hot compress to apply heat to your abdomen  Do this for 20 to 30 minutes every 2 hours for as many days as directed  · Avoid quick changes in position or movements that cause pain  · Do not lie flat in bed or bend over if you have heartburn  Ask your obstetrician if you should make any changes to the foods you eat  Ask if you can take any medicines for heartburn  · Eat more fiber and drink more liquids to relieve constipation  Fiber is found in fruits, vegetables, and whole-grain foods, such as whole-wheat bread and cereals  Ask how much liquid to drink each day and which liquids are best for you  Contact your obstetrician if:  · You continue to have abdominal pain that cannot be relieved      · You have a fever      · You have questions or concerns about your condition or care  Return to the emergency department if:   · You have sudden, severe pain or cramps that are so bad that you cannot walk or talk  · You have a fast heartbeat, shortness of breath, and you feel lightheaded or faint  · You have vaginal bleeding or discharge  · You have nausea, vomiting, fever, and severe pain on your right side  © 2017 [address]  Information is for End User's use only and may not be sold, redistributed or otherwise used for commercial purposes  All illustrations and images included in CareNotes® are the copyrighted property of A D A M , Inc  or Familia Mcgrath  The above information is an  only  It is not intended as medical advice for individual conditions or treatments  Talk to your doctor, nurse or pharmacist before following any medical regimen to see if it is safe and effective for you  Dysuria   WHAT YOU NEED TO KNOW:   Dysuria is difficulty urinating, or pain, burning, or discomfort with urination  Dysuria is usually a symptom of another problem  DISCHARGE INSTRUCTIONS:   Return to the emergency department if:   · You have severe back, side, or abdominal pain  · You have fever and shaking chills  · You vomit several times in a row  Contact your healthcare provider if:   · Your symptoms do not go away, even after treatment  · You have questions or concerns about your condition or care  Medicines:   · Medicines  may be given to help treat a bacterial infection or help decrease bladder spasms  · Take your medicine as directed  Contact your healthcare provider if you think your medicine is not helping or if you have side effects  Tell him of her if you are allergic to any medicine  Keep a list of the medicines, vitamins, and herbs you take  Include the amounts, and when and why you take them  Bring the list or the pill bottles to follow-up visits   Carry your medicine list with you in case of an emergency  Follow up with your healthcare provider as directed: Your healthcare provider may also refer you to a urologist or nephrologist to have additional testing  Write down your questions so you remember to ask them during your visits  Manage your dysuria:   · Drink more liquids  Liquids help flush out bacteria that may be causing an infection  Ask your healthcare provider how much liquid to drink each day and which liquids are best for you  · Take sitz baths as directed  Fill a bathtub with 4 to 6 inches of warm water  You may also use a sitz bath pan that fits over a toilet  Sit in the sitz bath for 20 minutes  Do this 2 to 3 times a day, or as directed  The warm water can help decrease pain and swelling  © 2017 [address]  Information is for End User's use only and may not be sold, redistributed or otherwise used for commercial purposes  All illustrations and images included in CareNotes® are the copyrighted property of A D [employer] M [employer] Inc  or Familia Mcgrath  The above information is an  only  It is not intended as medical advice for individual conditions or treatments  Talk to your doctor, nurse or pharmacist before following any medical regimen to see if it is safe and effective for you

## 2018-04-22 NOTE — ED NOTES
Paged on call [facility]   States she will be in shortly       Josef Hamilton RN  04/21/18 7028

## 2018-05-21 ENCOUNTER — APPOINTMENT (EMERGENCY)
Dept: CT IMAGING | Facility: HOSPITAL | Age: 28
End: 2018-05-21
Payer: COMMERCIAL

## 2018-05-21 ENCOUNTER — HOSPITAL ENCOUNTER (EMERGENCY)
Facility: HOSPITAL | Age: 28
Discharge: LEFT BEFORE DISCHARGE | End: 2018-05-21
Attending: EMERGENCY MEDICINE
Payer: COMMERCIAL

## 2018-05-21 VITALS
BODY MASS INDEX: 43.26 KG/M2 | OXYGEN SATURATION: 100 % | TEMPERATURE: 98.9 F | DIASTOLIC BLOOD PRESSURE: 69 MMHG | HEART RATE: 74 BPM | WEIGHT: 252 LBS | SYSTOLIC BLOOD PRESSURE: 129 MMHG | RESPIRATION RATE: 18 BRPM

## 2018-05-21 DIAGNOSIS — R51.9 HEADACHE: Primary | ICD-10-CM

## 2018-05-21 LAB
BACTERIA UR QL AUTO: ABNORMAL /HPF
BILIRUB UR QL STRIP: NEGATIVE
CLARITY UR: ABNORMAL
COLOR UR: ABNORMAL
EXT PREG TEST URINE: POSITIVE
GLUCOSE UR STRIP-MCNC: NEGATIVE MG/DL
HGB UR QL STRIP.AUTO: ABNORMAL
KETONES UR STRIP-MCNC: NEGATIVE MG/DL
LEUKOCYTE ESTERASE UR QL STRIP: ABNORMAL
NITRITE UR QL STRIP: NEGATIVE
NON-SQ EPI CELLS URNS QL MICRO: ABNORMAL /HPF
PH UR STRIP.AUTO: 7 [PH] (ref 4.5–8)
PROT UR STRIP-MCNC: ABNORMAL MG/DL
RBC #/AREA URNS AUTO: ABNORMAL /HPF
SP GR UR STRIP.AUTO: 1.02 (ref 1–1.03)
UROBILINOGEN UR QL STRIP.AUTO: 0.2 E.U./DL
WBC #/AREA URNS AUTO: ABNORMAL /HPF

## 2018-05-21 PROCEDURE — 81025 URINE PREGNANCY TEST: CPT | Performed by: EMERGENCY MEDICINE

## 2018-05-21 PROCEDURE — 96374 THER/PROPH/DIAG INJ IV PUSH: CPT

## 2018-05-21 PROCEDURE — 81001 URINALYSIS AUTO W/SCOPE: CPT

## 2018-05-21 PROCEDURE — 96361 HYDRATE IV INFUSION ADD-ON: CPT

## 2018-05-21 PROCEDURE — 81002 URINALYSIS NONAUTO W/O SCOPE: CPT | Performed by: EMERGENCY MEDICINE

## 2018-05-21 PROCEDURE — 96375 TX/PRO/DX INJ NEW DRUG ADDON: CPT

## 2018-05-21 PROCEDURE — 99284 EMERGENCY DEPT VISIT MOD MDM: CPT

## 2018-05-21 RX ORDER — MAGNESIUM SULFATE HEPTAHYDRATE 40 MG/ML
2 INJECTION, SOLUTION INTRAVENOUS ONCE
Status: COMPLETED | OUTPATIENT
Start: 2018-05-21 | End: 2018-05-21

## 2018-05-21 RX ORDER — DIPHENHYDRAMINE HYDROCHLORIDE 50 MG/ML
25 INJECTION INTRAMUSCULAR; INTRAVENOUS ONCE
Status: COMPLETED | OUTPATIENT
Start: 2018-05-21 | End: 2018-05-21

## 2018-05-21 RX ORDER — METOCLOPRAMIDE HYDROCHLORIDE 5 MG/ML
10 INJECTION INTRAMUSCULAR; INTRAVENOUS ONCE
Status: COMPLETED | OUTPATIENT
Start: 2018-05-21 | End: 2018-05-21

## 2018-05-21 RX ORDER — KETOROLAC TROMETHAMINE 30 MG/ML
15 INJECTION, SOLUTION INTRAMUSCULAR; INTRAVENOUS ONCE
Status: COMPLETED | OUTPATIENT
Start: 2018-05-21 | End: 2018-05-21

## 2018-05-21 RX ADMIN — METOCLOPRAMIDE 10 MG: 5 INJECTION, SOLUTION INTRAMUSCULAR; INTRAVENOUS at 19:03

## 2018-05-21 RX ADMIN — SODIUM CHLORIDE 500 ML: 0.9 INJECTION, SOLUTION INTRAVENOUS at 18:59

## 2018-05-21 RX ADMIN — MAGNESIUM SULFATE HEPTAHYDRATE 2 G: 40 INJECTION, SOLUTION INTRAVENOUS at 19:11

## 2018-05-21 RX ADMIN — KETOROLAC TROMETHAMINE 15 MG: 30 INJECTION, SOLUTION INTRAMUSCULAR at 19:05

## 2018-05-21 RX ADMIN — DIPHENHYDRAMINE HYDROCHLORIDE 25 MG: 50 INJECTION, SOLUTION INTRAMUSCULAR; INTRAVENOUS at 19:01

## 2018-05-21 NOTE — ED PROVIDER NOTES
History  Chief Complaint   Patient presents with    Headache     reports headache starting last night, vision change, ear pressure  occasional sharp rapid pain to L side  deneis nausea/vomiting     37-year-old female no significant past medical history presents for evaluation of headache  Symptoms began yesterday evening with a gradually worsening headache at the top of her head in the occiput  Throbbing in nature  Had a prodrome of visual scotoma other her periphery  Mild light sensitivity and nausea without vomiting  No fevers or recent trauma  History of headache she calls migraines in the past which are similar but today seems more severe  She remarks reports intermittent pins and needle sensation in all 3 fingers of both hands a, go lasting for seconds at a time  She denies any focal weakness  No recent antibiotics or steroids  No neck pain  Currently has no visual symptoms and headache is 4/10  Throbbing, constant, nonradiating  On review of systems patient states that 3 days ago she had a surgical portion had roughly 9 weeks that was elective  She currently denies any vaginal bleeding, discharge, abdominal pain fevers or chills  None       Past Medical History:   Diagnosis Date    Abdominal pain     Anxiety     Asthma     Bipolar disorder (HCC)     Chronic pain disorder     right hip    Depression     Diarrhea     Endometrial disorder 12/2012    Epigastric pain     Fatigue     Morbid obesity (HCC)     Nausea and vomiting        Past Surgical History:   Procedure Laterality Date    DIAGNOSTIC LAPAROSCOPY      DIAGNOSTIC LAPAROSCOPY      [place] COLONOSCOPY FLX DX W/COLLJ SPEC WHEN PFRMD N/A 8/4/2017    Procedure: EGD with bx AND COLONOSCOPY with bx;  Surgeon: Balaji Justice MD;  Location: [facility]; Service: Gastroenterology       History reviewed  No pertinent family history  I have reviewed and agree with the history as documented      Social History   Substance Use Topics  Smoking status: Current Every Day Smoker     Packs/day: 0 20    Smokeless tobacco: Never Used    Alcohol use Yes      Comment: Socially        Review of Systems   Constitutional: Negative for chills, fatigue and fever  HENT: Negative for congestion, ear pain, postnasal drip, rhinorrhea, sinus pressure and trouble swallowing  Eyes: Positive for visual disturbance  Negative for photophobia and pain  Respiratory: Negative for cough, chest tightness, shortness of breath and wheezing  Cardiovascular: Negative for chest pain and palpitations  Gastrointestinal: Negative for abdominal distention, abdominal pain, constipation, diarrhea and nausea  Genitourinary: Negative for dysuria, flank pain, frequency, hematuria, pelvic pain, urgency, vaginal bleeding, vaginal discharge and vaginal pain  Musculoskeletal: Negative for back pain, myalgias and neck stiffness  Skin: Negative for rash  Neurological: Positive for headaches  Negative for dizziness, tremors, seizures, syncope, weakness and numbness  Physical Exam  ED Triage Vitals [05/21/18 1747]   Temperature Pulse Respirations Blood Pressure SpO2   98 9 °F (37 2 °C) 74 18 129/69 100 %      Temp src Heart Rate Source Patient Position - Orthostatic VS BP Location FiO2 (%)   -- -- -- -- --      Pain Score       5           Orthostatic Vital Signs  Vitals:    05/21/18 1747   BP: 129/69   Pulse: 74       Physical Exam   Constitutional: She is oriented to person, place, and time  Vital signs are normal  She appears well-developed and well-nourished  She does not appear ill  No distress  HENT:   Head: Normocephalic and atraumatic  Head is without raccoon's eyes, without Espinoza's sign, without abrasion and without contusion     Right Ear: Tympanic membrane normal    Left Ear: Tympanic membrane normal    Nose: Nose normal    Mouth/Throat: Uvula is midline, oropharynx is clear and moist and mucous membranes are normal    Eyes: Conjunctivae, EOM and lids are normal  Pupils are equal, round, and reactive to light  Right conjunctiva has no hemorrhage  Left conjunctiva has no hemorrhage  Fundoscopic exam:       The right eye shows no hemorrhage and no papilledema  The left eye shows no hemorrhage and no papilledema  Neck: Trachea normal, normal range of motion, full passive range of motion without pain and phonation normal  Neck supple  No JVD present  No spinous process tenderness and no muscular tenderness present  Carotid bruit is not present  Normal range of motion present  No thyromegaly present  Neck is supple  Cardiovascular: Normal rate, regular rhythm and intact distal pulses  Exam reveals no friction rub  No murmur heard  Pulmonary/Chest: Effort normal and breath sounds normal  No accessory muscle usage or stridor  No tachypnea  No respiratory distress  She has no decreased breath sounds  She has no wheezes  She has no rhonchi  She has no rales  She exhibits no tenderness, no crepitus, no edema and no retraction  Abdominal: Soft  Normal appearance and bowel sounds are normal  She exhibits no distension  There is no tenderness  There is no rigidity, no rebound, no guarding and no CVA tenderness  Musculoskeletal: Normal range of motion  Lymphadenopathy:     She has no cervical adenopathy  Neurological: She is alert and oriented to person, place, and time  She has normal strength  She is not disoriented  She displays no tremor  No cranial nerve deficit or sensory deficit  She exhibits normal muscle tone  Gait normal  GCS eye subscore is 4  GCS verbal subscore is 5  GCS motor subscore is 6  Unremarkable cranial nerve exam  Pupils 4 mm equal round reactive to light  No nystagmus, normal extraocular motion  5 out of 5 upper and lower extremity strength  No subjective sensory deficits to the face, upper or lower extremity  Normal finger-nose and heel shin  Skin: Skin is warm, dry and intact  No rash noted  She is not diaphoretic  Psychiatric: She has a normal mood and affect  Nursing note and vitals reviewed        ED Medications  Medications   metoclopramide (REGLAN) injection 10 mg (10 mg Intravenous Given 5/21/18 1903)   ketorolac (TORADOL) injection 15 mg (15 mg Intravenous Given 5/21/18 1905)   diphenhydrAMINE (BENADRYL) injection 25 mg (25 mg Intravenous Given 5/21/18 1901)   magnesium sulfate 2 g/50 mL IVPB (premix) 2 g (0 g Intravenous Stopped 5/21/18 1912)   sodium chloride 0 9 % bolus 500 mL (0 mL Intravenous Stopped 5/21/18 1934)       Diagnostic Studies  Results Reviewed     Procedure Component Value Units Date/Time    Urine Microscopic [[id number]]  (Abnormal) Collected:  05/21/18 1845    Lab Status:  Final result Specimen:  Urine from Urine, Clean Catch Updated:  05/21/18 1923     RBC, UA 10-20 (A) /hpf      WBC, UA 4-10 (A) /hpf      Epithelial Cells Moderate (A) /hpf      Bacteria, UA Occasional /hpf     POCT urinalysis dipstick [[id number]]  (Abnormal) Resulted:  05/21/18 1842    Lab Status:  Final result Updated:  05/21/18 1844    POCT pregnancy, urine [[id number]]  (Normal) Resulted:  05/21/18 1842    Lab Status:  Final result Updated:  05/21/18 1844     EXT PREG TEST UR (Ref: Negative) positive    ED Urine Macroscopic [[id number]]  (Abnormal) Collected:  05/21/18 1845    Lab Status:  Final result Specimen:  Urine Updated:  05/21/18 1842     Color, UA Deborah     Clarity, UA Cloudy     pH, UA 7 0     Leukocytes, UA Small (A)     Nitrite, UA Negative     Protein, UA 30 (1+) (A) mg/dl      Glucose, UA Negative mg/dl      Ketones, UA Negative mg/dl      Urobilinogen, UA 0 2 E U /dl      Bilirubin, UA Negative     Blood, UA Large (A)     Specific Sells, UA 1 020    Narrative:       CLINITEK RESULT                 No orders to display         Procedures  Procedures      Phone Consults  ED Phone Contact    ED Course  ED Course as of May 21 2321   Mon May 21, 2018   1845 EXT PREG TEST UR (Ref: Negative): positive   1845 Pt had surgical  on Friday, three days ago  Currently has no vaginal bleeding, dc, abd pain, fevers  EXT PREG TEST UR (Ref: Negative): positive    Pt requesting to leave prior to full eval and tx/imaging  Pt awake, alert and oriented to person, place and time  Answering questions appropriately and does not appear intoxicated  Discussed risks of leaving prior to complete evaluation including intracranial injury, worsening headache, vision, syncope , death  Pt verbalizes these possibilities back to me and continues to request to leave  Discussed she can return at any time for further management   Pt signed ama paperwork  MDM  CritCare Time    Disposition  Final diagnoses:   Headache     Time reflects when diagnosis was documented in both MDM as applicable and the Disposition within this note     Time User Action Codes Description Comment    2018  7:22 PM Jana, [facility] Headache       ED Disposition     ED Disposition Condition Comment    AMA  Date: 2018  Patient: Quique Baker  Admitted: 2018  5:54 PM  Attending Provider: Linda Moser 143 or her authorized caregiver has made the decision for the patient to leave the emergency department against the advic e of her attending physician  She or her authorized caregiver has been informed and understands the inherent risks, including death, worsening headache, vision changes, intracranial injury, death  She or her authorized caregiver has decided to accept  e responsibility for this decision  Quique Baker and all necessary parties have been advised that she may return for further evaluation or treatment  Her condition at time of discharge was good    Quique Baker had current vital signs as follows:   /69   Pulse 74   Temp 98 9 °F (37 2 °C)   Resp 18   Wt 114 kg (252 lb)   LMP  (LMP Unknown)         Follow-up Information    None         There are no discharge medications for this patient  No discharge procedures on file  ED Provider  Attending physically available and evaluated Karla Barnett I managed the patient along with the ED Attending      Electronically Signed by         DO Veronica  05/21/18 5595

## 2018-05-21 NOTE — ED NOTES
Pt states meds given are causing increased dizziness and nausea  Pt states, " I do not like how these meds feel I do not want to be here anymore  I just want to go home  RN requested pt at least stay for CT and pt denclines   Pt wants to sign out VERNA Olivo  05/21/18 5964

## 2018-05-21 NOTE — ED ATTENDING ATTESTATION
Bridgett Hernández DO, saw and evaluated the patient  I have discussed the patient with the resident/non-physician practitioner and agree with the resident's/non-physician practitioner's findings, Plan of Care, and MDM as documented in the resident's/non-physician practitioner's note, except where noted  All available labs and Radiology studies were reviewed  At this point I agree with the current assessment done in the Emergency Department  I have conducted an independent evaluation of this patient a history and physical is as follows:      Critical Care Time  CritCare Time    Procedures   79-year-old female presents to the emergency department with headache that started last evening  The headache was gradual in onset  She describes it as being different than her typical migraine headaches  She states normally her typical migraine headaches are much worse  This headache is different because it is more of a pressure to the front of her head and the back of her head and also she feels pressure in her ears  She has also had visual scotomas  No fevers, nausea or vomiting  She also complains of tingling to the middle ring and 5th finger of both of her hands off and on since yesterday  On exam she is awake alert oriented x3 with normal speech  She is on her cell phone  Pupils are equal and reactive to light  Extraocular muscles are intact  TMs are normal  Neck is supple  Heart is regular without murmur  Lungs are clear  Abdomen is soft and nontender  Neurologically she has no focal motor deficits and no sensory deficits  Skin is warm and dry, normal color no rash

## 2018-05-21 NOTE — ED NOTES
Pt states  on Friday to explain positive pregnancy   Dr Angella Ramirez was aware and ok to give medications     Trino Marques RN  [phone]

## 2018-06-04 ENCOUNTER — OFFICE VISIT (OUTPATIENT)
Dept: URGENT CARE | Age: 28
End: 2018-06-04
Payer: COMMERCIAL

## 2018-06-04 VITALS
BODY MASS INDEX: 41.09 KG/M2 | DIASTOLIC BLOOD PRESSURE: 90 MMHG | HEART RATE: 72 BPM | HEIGHT: 65 IN | SYSTOLIC BLOOD PRESSURE: 114 MMHG | TEMPERATURE: 98.9 F | WEIGHT: 246.6 LBS | RESPIRATION RATE: 16 BRPM | OXYGEN SATURATION: 99 %

## 2018-06-04 DIAGNOSIS — M77.8 TENDONITIS OF WRIST, RIGHT: ICD-10-CM

## 2018-06-04 DIAGNOSIS — G43.801 OTHER MIGRAINE WITH STATUS MIGRAINOSUS, NOT INTRACTABLE: Primary | ICD-10-CM

## 2018-06-04 PROCEDURE — 99213 OFFICE O/P EST LOW 20 MIN: CPT | Performed by: PHYSICIAN ASSISTANT

## 2018-06-04 NOTE — PATIENT INSTRUCTIONS
Follow up with family doctor or neurology  Call [facility] to schedule an appointment: [phone]  Continue to monitor symptoms  If new or worsening symptoms occur go to ER  Migraine Headache   WHAT YOU NEED TO KNOW:   A migraine is a severe headache  The pain can be so severe that it interferes with your daily activities  A migraine can last a few hours up to several days  The exact cause of migraines is not known  DISCHARGE INSTRUCTIONS:   Return to the emergency department if:   · You have a headache that seems different or much worse than your usual migraine headache  · You have a severe headache with a fever or a stiff neck  · You have new problems with speech, vision, balance, or movement  · You feel like you are going to faint, you become confused, or you have a seizure  Contact your healthcare provider or neurologist if:   · Your migraines interfere with your daily activities  · Your medicines or treatments stop working  · You have questions or concerns about your condition or care  Medicines: You may need any of the following  Take medicine as soon as you feel a migraine begin  · Prescription pain medicine  may be given  Do not wait until the pain is severe before you take your medicine  · Migraine medicines  are used to help prevent a migraine or stop it once it starts  · Antinausea medicine  may be given to calm your stomach and to help prevent vomiting  This medicine can also help relieve pain  · Take your medicine as directed  Contact your healthcare provider if you think your medicine is not helping or if you have side effects  Tell him or her if you are allergic to any medicine  Keep a list of the medicines, vitamins, and herbs you take  Include the amounts, and when and why you take them  Bring the list or the pill bottles to follow-up visits  Carry your medicine list with you in case of an emergency    Manage your symptoms:   · Rest in a dark, quiet room  This will help decrease your pain  Sleep may also help relieve the pain  · Apply ice to decrease pain  Use an ice pack, or put crushed ice in a plastic bag  Cover the ice pack with a towel and place it on your head  Apply ice for 15 to 20 minutes every hour  · Apply heat to decrease pain and muscle spasms  Use a small towel dampened with warm water or a heating pad, or sit in a warm bath  Apply heat on the area for 20 to 30 minutes every 2 hours  You may alternate heat and ice  · Keep a migraine record  Write down when your migraines start and stop  Include your symptoms and what you were doing when a migraine began  Record what you ate or drank for 24 hours before the migraine started  Keep track of what you did to treat your migraine and if it worked  Bring the migraine record with you to visits with your healthcare provider  Follow up with your healthcare provider or neurologist as directed:  Bring your migraine record with you  Write down your questions so you remember to ask them during your visits  Prevent another migraine:   · Do not smoke  Nicotine and other chemicals in cigarettes and cigars can trigger a migraine or make it worse  Ask your healthcare provider for information if you currently smoke and need help to quit  E-cigarettes or smokeless tobacco still contain nicotine  Talk to your healthcare provider before you use these products  · Do not drink alcohol  Alcohol can trigger a migraine  It can also keep medicines used to treat your migraines from working  · Get regular exercise  Exercise may help prevent migraines  Talk to your healthcare provider about the best exercise plan for you  Try to get at least 30 minutes of exercise on most days  · Manage stress  Stress may trigger a migraine  Learn new ways to relax, such as deep breathing  · Create a sleep schedule  Go to bed and get up at the same times each day  Do not watch television before bed      · Eat regular meals  Include healthy foods such as include fruit, vegetables, whole-grain breads, low-fat dairy products, beans, lean meat, and fish  Do not have food or drinks that trigger your migraines  © 2017 [employer] Information is for End User's use only and may not be sold, redistributed or otherwise used for commercial purposes  All illustrations and images included in CareNotes® are the copyrighted property of A [employer] M [employer]  or [employer]  The above information is an  only  It is not intended as medical advice for individual conditions or treatments  Talk to your doctor, nurse or pharmacist before following any medical regimen to see if it is safe and effective for you  Tendinitis   WHAT YOU NEED TO KNOW:   Tendinitis is painful inflammation or breakdown of your tendons  It may also be called tendinopathy  Tendinitis often occurs in the knee, shoulder, ankle, hip, wrist or elbow  DISCHARGE INSTRUCTIONS:   Medicines:   · Pain medicines  such as acetaminophen or NSAIDs may decrease swelling and pain or fever  These medicines are available without a doctor's order  Ask which medicine to take  Ask how much to take and when to take it  Follow directions  Acetaminophen and NSAIDs can cause liver or kidney damage if not taken correctly  If you take blood thinner medicine, always ask your healthcare provider if NSAIDs are safe for you  Always read the medicine label and follow the directions on it before using these medicine  · Take your medicine as directed  Contact your healthcare provider if you think your medicine is not helping or if you have side effects  Tell him if you are allergic to any medicine  Keep a list of the medicines, vitamins, and herbs you take  Include the amounts, and when and why you take them  Bring the list or the pill bottles to follow-up visits  Carry your medicine list with you in case of an emergency    Management:   · Rest  your tendon as directed to help it heal  Ask your healthcare provider if you need to stop putting weight on your affected area  · Ice  helps decrease swelling and pain  Ice may also help prevent tissue damage  Use an ice pack, or put crushed ice in a plastic bag  Cover it with a towel and place it on the affected area for 10 to 15 minutes every hour or as directed  · Support devices  such as a cane, splint, shoe insert, or brace may help reduce your pain  · Physical therapy  may be ordered by your healthcare provider  This may be used to teach you exercises to help improve movement and strength, and to decrease pain  You may also learn how to improve your posture, and how to lift or exercise correctly  Prevention:   · Stretch and warm up  before you exercise  · Exercise regularly  to strengthen the muscles around your joint  Ease into an exercise routine for the first 3 weeks to prevent another injury  Ask your healthcare provider about the best exercise plan for you  Rest fully between activities  · Use the right equipment  for sports and exercise  Wear braces or tape around weak joints as directed  Follow up with your healthcare provider as directed:  Write down your questions so you remember to ask them during your visits  Contact your healthcare provider if:   · You have increased pain even after you take medicine  · You have questions or concerns about your condition or care  Return to the emergency department if:   · You have increased redness over the joint, or swelling in the joint  · You suddenly cannot move your joint  © 2017 [employer] Information is for End User's use only and may not be sold, redistributed or otherwise used for commercial purposes  All illustrations and images included in CareNotes® are the copyrighted property of A [employer] M [employer]  or [employer]  The above information is an  only   It is not intended as medical advice for individual conditions or treatments  Talk to your doctor, nurse or pharmacist before following any medical regimen to see if it is safe and effective for you

## 2018-06-04 NOTE — PROGRESS NOTES
[address] [facility]        NAME: Emil Mistry is a 24 y o  female  : 1990    MRN: [id number]  DATE: 2018  TIME: 10:44 AM    Assessment and Plan   Other migraine with status migrainosus, not intractable [G43 801]  1  Other migraine with status migrainosus, not intractable     2  Tendonitis of wrist, right           Patient Instructions       Follow up with family doctor or neurology  Call Michael Perera to schedule an appointment: [phone]  Continue to monitor symptoms  If new or worsening symptoms occur go to ER  Chief Complaint     Chief Complaint   Patient presents with    Headache     Pt c/o intermittent "sharp pains" across her temples, and c/o intermittent episodes of being slow to process visual information especially before bed  Pt also intermittent aches or a "burning sensation" in wrist and elbow, sometimes shooting up to shoulder for 4 days now  History of Present Illness       Headache    This is a recurrent problem  Episode onset: Patient does not currently have any symptoms  Pt has history of migraines and has never been seen by neurology for this  She was seen  in ED for this  No migraines since then, but she wants to be evaluated  The problem occurs intermittently  Pain location: Sometimes behind her eyes sometimes occipital area  Currently no pain  The pain quality is similar to prior headaches  Associated symptoms include blurred vision, photophobia, tingling (Occasional in radial distribution) and a visual change  Pertinent negatives include no abdominal pain, anorexia, coughing, dizziness, ear pain, eye pain, eye redness, eye watering, facial sweating, fever, loss of balance, nausea, numbness, phonophobia, seizures, sinus pressure, vomiting or weakness  Nothing aggravates the symptoms  Treatments tried: Got Migraine cocktail in ER and this fully resolved symptoms  Improvement on treatment: Complete resolution  Currently asymptomatic   Her past medical history is significant for migraine headaches  Arm Pain    Incident onset: 4 days  The incident occurred at work  There was no injury mechanism  Pain location: R wrist and R elbow  The quality of the pain is described as aching and burning  The pain does not radiate  The pain is at a severity of 7/10  The pain has been fluctuating since the incident  Associated symptoms include tingling (Occasional in radial distribution)  Pertinent negatives include no chest pain, muscle weakness or numbness  Exacerbated by: at end of day after work  She has tried nothing for the symptoms  Patient works at [employer] and is on computer all day  Review of Systems   Review of Systems   Constitutional: Negative for appetite change, chills, fatigue and fever  HENT: Negative for ear pain, facial swelling, sinus pain, sinus pressure and trouble swallowing  Eyes: Positive for blurred vision and photophobia  Negative for pain and redness  Respiratory: Negative  Negative for cough, shortness of breath and wheezing  Cardiovascular: Negative  Negative for chest pain and palpitations  Gastrointestinal: Negative  Negative for abdominal pain, anorexia, diarrhea, nausea and vomiting  Endocrine: Negative  Genitourinary: Negative  Negative for dysuria  Musculoskeletal: Negative  Skin: Negative  Allergic/Immunologic: Negative  Neurological: Positive for tingling (Occasional in radial distribution) and headaches  Negative for dizziness, seizures, syncope, speech difficulty, weakness, numbness and loss of balance  Hematological: Negative  Psychiatric/Behavioral: Negative  Current Medications     No current outpatient prescriptions on file      Current Allergies     Allergies as of 06/04/2018 - Reviewed 06/04/2018   Allergen Reaction Noted    Shellfish-derived products Anaphylaxis 02/09/2016    Other  10/12/2016    Shellfish allergy              The following portions of the patient's history were reviewed and updated as appropriate: allergies, current medications, past family history, past medical history, past social history, past surgical history and problem list      Past Medical History:   Diagnosis Date    Abdominal pain     Anxiety     Asthma     [facility] [address] [facility]     Chronic pain disorder     right hip    Depression     Diarrhea     Endometrial disorder 12/2012    Epigastric pain     Fatigue     Morbid obesity ([facility] [address] [facility]     Nausea and vomiting        Past Surgical History:   Procedure Laterality Date    DIAGNOSTIC LAPAROSCOPY      DIAGNOSTIC LAPAROSCOPY      DILATION AND CURETTAGE OF UTERUS      [place] COLONOSCOPY FLX DX W/COLLJ SPEC WHEN PFRMD N/A 8/4/2017    Procedure: EGD with bx AND COLONOSCOPY with bx;  Surgeon: Rahda Jasso MD;  Location: [facility]; Service: Gastroenterology       History reviewed  No pertinent family history  Medications have been verified  Objective   /90   Pulse 72   Temp 98 9 °F (37 2 °C) (Tympanic)   Resp 16   Ht 5' 5" (1 651 m)   Wt 112 kg (246 lb 9 6 oz)   LMP 03/30/2018   SpO2 99%   BMI 41 04 kg/m²        Physical Exam     Physical Exam   Constitutional: She is oriented to person, place, and time  She appears well-developed and well-nourished  No distress  HENT:   Head: Normocephalic and atraumatic  Right Ear: External ear normal    Left Ear: External ear normal    Nose: Nose normal    Mouth/Throat: Oropharynx is clear and moist  No oropharyngeal exudate  Eyes: Conjunctivae and EOM are normal  Pupils are equal, round, and reactive to light  Right eye exhibits no discharge  Left eye exhibits no discharge  No scleral icterus  Neck: Normal range of motion  Neck supple  Cardiovascular: Normal rate, regular rhythm, normal heart sounds and intact distal pulses  Pulmonary/Chest: Effort normal and breath sounds normal  No respiratory distress  She has no wheezes  She has no rales     Musculoskeletal: She exhibits no edema or deformity  Right elbow: Normal She exhibits normal range of motion, no swelling, no effusion and no deformity  No tenderness found  Right wrist: Normal  She exhibits normal range of motion, no tenderness, no bony tenderness (-) Tinel test, no swelling, no crepitus and no deformity  Lymphadenopathy:     She has no cervical adenopathy  Neurological: She is alert and oriented to person, place, and time  She has normal strength  She is not disoriented  She displays no tremor  No cranial nerve deficit or sensory deficit  She exhibits normal muscle tone  She displays a negative Romberg sign  She displays no seizure activity  Coordination and gait normal  GCS eye subscore is 4  GCS verbal subscore is 5  GCS motor subscore is 6  Skin: Skin is warm  No rash noted  She is not diaphoretic  Nursing note and vitals reviewed

## 2018-06-04 NOTE — LETTER
June 4, 2018     Patient: Davidson Hurst   YOB: 1990   Date of Visit: 6/4/2018       To Whom It May Concern: It is my medical opinion that Kira Centeno may return to work on 6/5/2018  If you have any questions or concerns, please don't hesitate to call           Sincerely,        Fazal Murphy PA-C    CC: No Recipients

## 2018-06-10 ENCOUNTER — HOSPITAL ENCOUNTER (EMERGENCY)
Facility: HOSPITAL | Age: 28
Discharge: HOME | End: 2018-06-10
Attending: EMERGENCY MEDICINE | Admitting: EMERGENCY MEDICINE
Payer: COMMERCIAL

## 2018-06-10 ENCOUNTER — APPOINTMENT (EMERGENCY)
Dept: RADIOLOGY | Facility: HOSPITAL | Age: 28
End: 2018-06-10
Payer: COMMERCIAL

## 2018-06-10 VITALS
HEART RATE: 57 BPM | WEIGHT: 250 LBS | DIASTOLIC BLOOD PRESSURE: 85 MMHG | RESPIRATION RATE: 18 BRPM | BODY MASS INDEX: 41.65 KG/M2 | HEIGHT: 65 IN | SYSTOLIC BLOOD PRESSURE: 144 MMHG | OXYGEN SATURATION: 100 % | TEMPERATURE: 97.2 F

## 2018-06-10 DIAGNOSIS — R51.9 HEADACHE: Primary | ICD-10-CM

## 2018-06-10 LAB
ALBUMIN SERPL BCP-MCNC: 3.9 G/DL (ref 3.5–5)
ALP SERPL-CCNC: 74 U/L (ref 46–116)
ALT SERPL W P-5'-P-CCNC: 34 U/L (ref 12–78)
ANION GAP SERPL CALCULATED.3IONS-SCNC: 3 MMOL/L (ref 4–13)
AST SERPL W P-5'-P-CCNC: 15 U/L (ref 5–45)
BASOPHILS # BLD AUTO: 0.03 THOUSANDS/ΜL (ref 0–0.1)
BASOPHILS NFR BLD AUTO: 1 % (ref 0–1)
BILIRUB SERPL-MCNC: 0.3 MG/DL (ref 0.2–1)
BUN SERPL-MCNC: 10 MG/DL (ref 5–25)
CALCIUM SERPL-MCNC: 9 MG/DL (ref 8.3–10.1)
CHLORIDE SERPL-SCNC: 105 MMOL/L (ref 100–108)
CO2 SERPL-SCNC: 31 MMOL/L (ref 21–32)
CREAT SERPL-MCNC: 0.65 MG/DL (ref 0.6–1.3)
EOSINOPHIL # BLD AUTO: 0.31 THOUSAND/ΜL (ref 0–0.61)
EOSINOPHIL NFR BLD AUTO: 5 % (ref 0–6)
ERYTHROCYTE [DISTWIDTH] IN BLOOD BY AUTOMATED COUNT: 12.6 % (ref 11.6–15.1)
GFR SERPL CREATININE-BSD FRML MDRD: 124 ML/MIN/1.73SQ M
GLUCOSE SERPL-MCNC: 87 MG/DL (ref 65–140)
HCT VFR BLD AUTO: 39 % (ref 34.8–46.1)
HGB BLD-MCNC: 12.7 G/DL (ref 11.5–15.4)
IMM GRANULOCYTES # BLD AUTO: 0.01 THOUSAND/UL (ref 0–0.2)
IMM GRANULOCYTES NFR BLD AUTO: 0 % (ref 0–2)
LYMPHOCYTES # BLD AUTO: 2.23 THOUSANDS/ΜL (ref 0.6–4.47)
LYMPHOCYTES NFR BLD AUTO: 36 % (ref 14–44)
MCH RBC QN AUTO: 29.5 PG (ref 26.8–34.3)
MCHC RBC AUTO-ENTMCNC: 32.6 G/DL (ref 31.4–37.4)
MCV RBC AUTO: 91 FL (ref 82–98)
MONOCYTES # BLD AUTO: 0.57 THOUSAND/ΜL (ref 0.17–1.22)
MONOCYTES NFR BLD AUTO: 9 % (ref 4–12)
NEUTROPHILS # BLD AUTO: 3.09 THOUSANDS/ΜL (ref 1.85–7.62)
NEUTS SEG NFR BLD AUTO: 49 % (ref 43–75)
NRBC BLD AUTO-RTO: 0 /100 WBCS
PLATELET # BLD AUTO: 225 THOUSANDS/UL (ref 149–390)
PMV BLD AUTO: 11 FL (ref 8.9–12.7)
POTASSIUM SERPL-SCNC: 3.8 MMOL/L (ref 3.5–5.3)
PROT SERPL-MCNC: 7.5 G/DL (ref 6.4–8.2)
RBC # BLD AUTO: 4.31 MILLION/UL (ref 3.81–5.12)
SODIUM SERPL-SCNC: 139 MMOL/L (ref 136–145)
WBC # BLD AUTO: 6.24 THOUSAND/UL (ref 4.31–10.16)

## 2018-06-10 PROCEDURE — 96365 THER/PROPH/DIAG IV INF INIT: CPT

## 2018-06-10 PROCEDURE — 96375 TX/PRO/DX INJ NEW DRUG ADDON: CPT

## 2018-06-10 PROCEDURE — 99284 EMERGENCY DEPT VISIT MOD MDM: CPT

## 2018-06-10 PROCEDURE — 36415 COLL VENOUS BLD VENIPUNCTURE: CPT | Performed by: EMERGENCY MEDICINE

## 2018-06-10 PROCEDURE — 85025 COMPLETE CBC W/AUTO DIFF WBC: CPT | Performed by: EMERGENCY MEDICINE

## 2018-06-10 PROCEDURE — 80053 COMPREHEN METABOLIC PANEL: CPT | Performed by: EMERGENCY MEDICINE

## 2018-06-10 PROCEDURE — 70450 CT HEAD/BRAIN W/O DYE: CPT

## 2018-06-10 RX ORDER — KETOROLAC TROMETHAMINE 30 MG/ML
15 INJECTION, SOLUTION INTRAMUSCULAR; INTRAVENOUS ONCE
Status: COMPLETED | OUTPATIENT
Start: 2018-06-10 | End: 2018-06-10

## 2018-06-10 RX ORDER — MAGNESIUM SULFATE HEPTAHYDRATE 40 MG/ML
2 INJECTION, SOLUTION INTRAVENOUS ONCE
Status: COMPLETED | OUTPATIENT
Start: 2018-06-10 | End: 2018-06-10

## 2018-06-10 RX ORDER — LIDOCAINE HYDROCHLORIDE 20 MG/ML
JELLY TOPICAL ONCE
Status: COMPLETED | OUTPATIENT
Start: 2018-06-10 | End: 2018-06-10

## 2018-06-10 RX ORDER — DEXAMETHASONE SODIUM PHOSPHATE 10 MG/ML
10 INJECTION, SOLUTION INTRAMUSCULAR; INTRAVENOUS ONCE
Status: COMPLETED | OUTPATIENT
Start: 2018-06-10 | End: 2018-06-10

## 2018-06-10 RX ORDER — DIPHENHYDRAMINE HYDROCHLORIDE 50 MG/ML
25 INJECTION INTRAMUSCULAR; INTRAVENOUS ONCE
Status: COMPLETED | OUTPATIENT
Start: 2018-06-10 | End: 2018-06-10

## 2018-06-10 RX ORDER — NAPROXEN 500 MG/1
500 TABLET ORAL 2 TIMES DAILY WITH MEALS
Qty: 30 TABLET | Refills: 0 | Status: SHIPPED | OUTPATIENT
Start: 2018-06-10 | End: 2018-10-26

## 2018-06-10 RX ADMIN — DIPHENHYDRAMINE HYDROCHLORIDE 25 MG: 50 INJECTION, SOLUTION INTRAMUSCULAR; INTRAVENOUS at 19:25

## 2018-06-10 RX ADMIN — SODIUM CHLORIDE 1000 ML: 0.9 INJECTION, SOLUTION INTRAVENOUS at 19:25

## 2018-06-10 RX ADMIN — LIDOCAINE HYDROCHLORIDE: 20 JELLY TOPICAL at 19:37

## 2018-06-10 RX ADMIN — KETOROLAC TROMETHAMINE 15 MG: 30 INJECTION, SOLUTION INTRAMUSCULAR at 19:32

## 2018-06-10 RX ADMIN — MAGNESIUM SULFATE HEPTAHYDRATE 2 G: 40 INJECTION, SOLUTION INTRAVENOUS at 19:25

## 2018-06-10 RX ADMIN — DEXAMETHASONE SODIUM PHOSPHATE 10 MG: 10 INJECTION, SOLUTION INTRAMUSCULAR; INTRAVENOUS at 19:25

## 2018-06-10 NOTE — DISCHARGE INSTRUCTIONS
Acute Headache, Ambulatory Care   GENERAL INFORMATION:   An acute headache  is pain or discomfort that starts suddenly and gets worse quickly  The cause of an acute headache may not be known  It may be triggered by stress, fatigue, hormones, food, or trauma  Common related symptoms include the following:   · Fever    · Sinus pressure    · Loss of memory    · Nausea or vomiting    · Problems with your vision, such as watery or red eyes, loss of vision, or pain in bright light    · Stiff neck    · Tenderness of the head and neck area    · Trouble staying awake, or being less alert than usual     · Weakness or less energy  Seek immediate care for the following symptoms:   · Severe pain    · A headache that occurs after a blow to the head, a fall, or other trauma     · Confusion or forgetfulness    · Numbness on one side of your face or body  Treatment for an acute headache  may include medicine to decrease pain  You may also need biofeedback or cognitive behavioral therapy  Ask your healthcare provider about these and other treatments for an acute headache  Manage my symptoms:   · Apply heat  on your head for 20 to 30 minutes every 2 hours for as many days as directed  Heat helps decrease pain and muscle spasms  You may alternate heat and ice  · Apply ice  on your head for 15 to 20 minutes every hour or as directed  Use an ice pack, or put crushed ice in a plastic bag  Cover it with a towel  Ice helps decrease pain  · Relax your muscles  Lie down in a comfortable position and close your eyes  Relax your muscles slowly  Start at your toes and work your way up your body  · Keep a record of your headaches  Write down when your headaches start and stop  Include your symptoms and what you were doing when the headache began  Record what you ate or drank for 24 hours before the headache started  Describe the pain and where it hurts  Keep track of what you did to treat your headache and whether it worked    Follow up with your healthcare provider as directed:  Bring your headache record with you when you see your healthcare provider  Write down your questions so you remember to ask them during your visits  CARE AGREEMENT:   You have the right to help plan your care  Learn about your health condition and how it may be treated  Discuss treatment options with your caregivers to decide what care you want to receive  You always have the right to refuse treatment  The above information is an  only  It is not intended as medical advice for individual conditions or treatments  Talk to your doctor, nurse or pharmacist before following any medical regimen to see if it is safe and effective for you  © 2014 [address] is for End User's use only and may not be sold, redistributed or otherwise used for commercial purposes  All illustrations and images included in CareNotes® are the copyrighted property of A D A M , Inc  or Familia Mcgrath

## 2018-06-10 NOTE — ED ATTENDING ATTESTATION
I, Licha Thayer DO, saw and evaluated the patient  I have discussed the patient with the resident/non-physician practitioner and agree with the resident's/non-physician practitioner's findings, Plan of Care, and MDM as documented in the resident's/non-physician practitioner's note, except where noted  All available labs and Radiology studies were reviewed  At this point I agree with the current assessment done in the Emergency Department  I have conducted an independent evaluation of this patient a history and physical is as follows:      Critical Care Time  CritCare Time    Procedures     27 yr fem to the ED with headaches since early  a week before she had an   No meds started or stopped  Pain will be one side or the other, pressure with intermittent sharp pain that will last up to a couple of hours  No neck pain  Not as bad as migraines that she used to get  Exm: mod discomfort, cranial grossly intact, neg nuchal   Pln: CT for 6 hour window and  Headache tx  Character cw cluster

## 2018-06-10 NOTE — ED PROVIDER NOTES
History  Chief Complaint   Patient presents with    Headache - Recurrent or Known Dx Migraines     Patient states she has had a migraine for 3 weeks now no relief with OTC medications  HPI  25 yo F pw headache that began around 1 pm earlier today  Pt  Says HA is behind R eye and radiates to occiput  Pt  Says pain has been constant and refractory to NSAIDS, tylenol  She has had similar headaches intermittently, almost daily since late may  HA was not sudden onset or maximal at onset  No neck stiffnes  No weakness/numbness/tingling  No change in vision  No n/v/d/f/ch/cp/sob  Pt  Has pmh bipolar disorder, not on any meds  12 systems reviewed otherwise negative except as stated in hpi  Pt  Was seen on  for headache and left ama after migraine cocktail  Concern for adverse effect from reglan administration    ipm/plan: 25 yo femael pw headache that began around 1 pm today  Has had similar headaches almost daily for the last several weeks  Concern for migraine, cluster HA  She arrived to ER within 6 hours of onset  I will get ct head to r/o SAH  I will treat with decadron, benadryl  If ct head wnl, will give toradol  I will also apply supplemental o2 and instill lidocaine into R nare to anesthetize sphenopalatine ganglion in case this is a cluster headache  I will also measure optic nerve sheath diameter on bedside US to r/o increased icp  I will check basic labs  Pt  Had a recent  and is not pregnant      None       Past Medical History:   Diagnosis Date    Abdominal pain     Anxiety     Asthma     [facility] [address] [facility]     Chronic pain disorder     right hip    Depression     Diarrhea     Endometrial disorder 2012    Epigastric pain     Fatigue     Morbid obesity (HCC)     Nausea and vomiting        Past Surgical History:   Procedure Laterality Date    DIAGNOSTIC LAPAROSCOPY      DIAGNOSTIC LAPAROSCOPY      DILATION AND CURETTAGE OF UTERUS      [place] COLONOSCOPY FLX DX W/COLLJ SPEC WHEN PFRMD N/A 8/4/2017    Procedure: EGD with bx AND COLONOSCOPY with bx;  Surgeon: Nilam Rosario MD;  Location: [facility]; Service: Gastroenterology       No family history on file  I have reviewed and agree with the history as documented  Social History   Substance Use Topics    Smoking status: Current Every Day Smoker     Packs/day: 0 20    Smokeless tobacco: Never Used    Alcohol use Yes      Comment: Socially        Review of Systems   Constitutional: Negative for activity change, appetite change, chills and fever  HENT: Negative for congestion, dental problem, sore throat, trouble swallowing and voice change  Eyes: Positive for photophobia  Negative for pain, discharge, redness, itching and visual disturbance  Respiratory: Negative for cough and shortness of breath  Cardiovascular: Negative for chest pain, palpitations and leg swelling  Gastrointestinal: Negative for abdominal distention, abdominal pain, diarrhea, nausea and vomiting  Endocrine: Negative for polyuria  Genitourinary: Negative for dysuria, vaginal discharge and vaginal pain  Musculoskeletal: Negative for arthralgias, back pain, gait problem, joint swelling, myalgias, neck pain and neck stiffness  Skin: Negative for rash and wound  Allergic/Immunologic: Negative  Neurological: Positive for headaches  Negative for dizziness, tremors, seizures, syncope, facial asymmetry, speech difficulty, weakness, light-headedness and numbness  Hematological: Negative for adenopathy  Does not bruise/bleed easily  Psychiatric/Behavioral: Negative for agitation         Physical Exam  ED Triage Vitals [06/10/18 1836]   Temperature Pulse Respirations Blood Pressure SpO2   (!) 97 2 °F (36 2 °C) 68 18 133/78 99 %      Temp Source Heart Rate Source Patient Position - Orthostatic VS BP Location FiO2 (%)   Tympanic Monitor Sitting Right arm --      Pain Score       8           Orthostatic Vital Signs  Vitals:    06/10/18 1836 06/10/18 1902 06/10/18 1928   BP: 133/78 133/84 144/85   Pulse: 68 63 57   Patient Position - Orthostatic VS: Sitting         Physical Exam   Constitutional: She is oriented to person, place, and time  She appears well-developed and well-nourished  No distress  HENT:   Head: Normocephalic and atraumatic  Right Ear: No hemotympanum  Left Ear: No hemotympanum  Nose: Nose normal  No nasal septal hematoma  Mouth/Throat: Uvula is midline, oropharynx is clear and moist and mucous membranes are normal  She does not have dentures  No oropharyngeal exudate  Eyes: Conjunctivae and EOM are normal  Pupils are equal, round, and reactive to light  Right eye exhibits no discharge  Left eye exhibits no discharge  No scleral icterus  Right eye exhibits no nystagmus  Left eye exhibits no nystagmus  melonie 3 to 2 mm bl  Normal eom  No nystagmus  Normal visual fields  Normal optic disc margins on fundoscopic exam   Neck: Trachea normal, normal range of motion, full passive range of motion without pain and phonation normal  Neck supple  No JVD present  No tracheal tenderness present  No tracheal deviation present  No thyromegaly present  No c-spine tenderness  Normal neck rom  No meningismus   Cardiovascular: Normal rate, regular rhythm, normal heart sounds and intact distal pulses  Exam reveals no gallop and no friction rub  No murmur heard  Pulmonary/Chest: Effort normal and breath sounds normal  No stridor  No respiratory distress  She has no wheezes  She has no rales  She exhibits no tenderness  Abdominal: Soft  Bowel sounds are normal  She exhibits no distension and no mass  There is no tenderness  There is no rebound and no guarding  No hernia  Musculoskeletal: Normal range of motion  She exhibits no edema, tenderness or deformity  Lymphadenopathy:     She has no cervical adenopathy  Neurological: She is alert and oriented to person, place, and time  She has normal strength  She is not disoriented   No cranial nerve deficit or sensory deficit  GCS eye subscore is 4  GCS verbal subscore is 5  GCS motor subscore is 6  Reflex Scores:       Patellar reflexes are 2+ on the right side and 2+ on the left side  Achilles reflexes are 2+ on the right side and 2+ on the left side  Cn 2-12 intact  Normal speech  Normal heel to shin, finger to nose  Normal gait  Normal speech   Skin: Skin is warm and dry  Capillary refill takes less than 2 seconds  No rash noted  She is not diaphoretic  No erythema  No pallor  Psychiatric: She has a normal mood and affect  Nursing note and vitals reviewed        ED Medications  Medications   sodium chloride 0 9 % bolus 1,000 mL (0 mL Intravenous Stopped 6/10/18 2005)   dexamethasone (PF) (DECADRON) injection 10 mg (10 mg Intravenous Given 6/10/18 1925)   diphenhydrAMINE (BENADRYL) injection 25 mg (25 mg Intravenous Given 6/10/18 1925)   magnesium sulfate 2 g/50 mL IVPB (premix) 2 g (0 g Intravenous Stopped 6/10/18 1957)   ketorolac (TORADOL) injection 15 mg (15 mg Intravenous Given 6/10/18 1932)   lidocaine (URO-JET) 2 % topical gel ( Topical Given 6/10/18 1937)       Diagnostic Studies  Results Reviewed     Procedure Component Value Units Date/Time    Comprehensive metabolic panel [[id number]]  (Abnormal) Collected:  06/10/18 1931    Lab Status:  Final result Specimen:  Blood from Arm, Left Updated:  06/10/18 1959     Sodium 139 mmol/L      Potassium 3 8 mmol/L      Chloride 105 mmol/L      CO2 31 mmol/L      Anion Gap 3 (L) mmol/L      BUN 10 mg/dL      Creatinine 0 65 mg/dL      Glucose 87 mg/dL      Calcium 9 0 mg/dL      AST 15 U/L      ALT 34 U/L      Alkaline Phosphatase 74 U/L      Total Protein 7 5 g/dL      Albumin 3 9 g/dL      Total Bilirubin 0 30 mg/dL      eGFR 124 ml/min/1 73sq m     Narrative:         National Kidney Disease Education Program recommendations are as follows:  GFR calculation is accurate only with a steady state creatinine  Chronic Kidney disease less than 60 ml/min/1 73 sq  meters  Kidney failure less than 15 ml/min/1 73 sq  meters  CBC and differential [[id number]] Collected:  06/10/18 1931    Lab Status:  Final result Specimen:  Blood from Arm, Left Updated:  06/10/18 1941     WBC 6 24 Thousand/uL      RBC 4 31 Million/uL      Hemoglobin 12 7 g/dL      Hematocrit 39 0 %      MCV 91 fL      MCH 29 5 pg      MCHC 32 6 g/dL      RDW 12 6 %      MPV 11 0 fL      Platelets 596 Thousands/uL      nRBC 0 /100 WBCs      Neutrophils Relative 49 %      Immat GRANS % 0 %      Lymphocytes Relative 36 %      Monocytes Relative 9 %      Eosinophils Relative 5 %      Basophils Relative 1 %      Neutrophils Absolute 3 09 Thousands/µL      Immature Grans Absolute 0 01 Thousand/uL      Lymphocytes Absolute 2 23 Thousands/µL      Monocytes Absolute 0 57 Thousand/µL      Eosinophils Absolute 0 31 Thousand/µL      Basophils Absolute 0 03 Thousands/µL                  CT head without contrast   Final Result by Trav Reyna DO (06/10 1934)      No acute intracranial abnormality  Workstation performed: [id number]               Procedures  Procedures      Phone Consults  ED Phone Contact    ED Course  ED Course as of Francis 10 2127   Ti Maldonadobenjamin Francis 10, 2018   1956 Optic nerve sheath diameter < 5 mm bl eyes  1956 Pt  Says headache now resolved  I will dc with f/u to neurology  Return precuations discussed  MDM  CritCare Time    Disposition  Final diagnoses:   Headache     Time reflects when diagnosis was documented in both MDM as applicable and the Disposition within this note     Time User Action Codes Description Comment    6/10/2018  7:57 PM Haris CADET Add [R51] Headache       ED Disposition     ED Disposition Condition Comment    Discharge  Servando Duarte discharge to home/self care      Condition at discharge: Good        Follow-up Information     Follow up With Specialties Details Why Contact Info Prieto Perrin Neurology [facility] Neurology In 2 days headache, neuro follow up [address]ae MD Family Medicine  As needed [address] [place]  [address] [phone]       [facility] Emergency Department Emergency Medicine  If symptoms worsen [address]  [phone]  ED, [address] [place], [place], [address]          Discharge Medication List as of 6/10/2018  7:58 PM      START taking these medications    Details   naproxen (NAPROSYN) 500 mg tablet Take 1 tablet (500 mg total) by mouth 2 (two) times a day with meals for 30 doses, Starting Sun 6/10/2018, Until Mon 6/25/2018, Print           No discharge procedures on file  ED Provider  Attending physically available and evaluated Cleda Forward  I managed the patient along with the ED Attending      Electronically Signed by         Cami Gramajo MD  06/10/18 7330

## 2018-06-11 ENCOUNTER — TELEPHONE (OUTPATIENT)
Dept: NEUROLOGY | Facility: CLINIC | Age: 28
End: 2018-06-11

## 2018-10-25 ENCOUNTER — TELEPHONE (OUTPATIENT)
Dept: OBGYN CLINIC | Facility: CLINIC | Age: 28
End: 2018-10-25

## 2018-10-26 ENCOUNTER — OFFICE VISIT (OUTPATIENT)
Dept: OBGYN CLINIC | Facility: CLINIC | Age: 28
End: 2018-10-26
Payer: COMMERCIAL

## 2018-10-26 VITALS
SYSTOLIC BLOOD PRESSURE: 130 MMHG | HEIGHT: 65 IN | DIASTOLIC BLOOD PRESSURE: 84 MMHG | BODY MASS INDEX: 41.09 KG/M2 | WEIGHT: 246.6 LBS

## 2018-10-26 DIAGNOSIS — N89.8 VAGINAL DISCHARGE: ICD-10-CM

## 2018-10-26 DIAGNOSIS — Z32.00 POSSIBLE PREGNANCY: ICD-10-CM

## 2018-10-26 DIAGNOSIS — Z11.3 SCREENING FOR STD (SEXUALLY TRANSMITTED DISEASE): Primary | ICD-10-CM

## 2018-10-26 DIAGNOSIS — R10.2 PELVIC PAIN: ICD-10-CM

## 2018-10-26 LAB
SL AMB POCT URINE HCG: NEGATIVE
SL AMB POCT WET MOUNT: ABNORMAL

## 2018-10-26 PROCEDURE — 87591 N.GONORRHOEAE DNA AMP PROB: CPT | Performed by: OBSTETRICS & GYNECOLOGY

## 2018-10-26 PROCEDURE — 81025 URINE PREGNANCY TEST: CPT | Performed by: OBSTETRICS & GYNECOLOGY

## 2018-10-26 PROCEDURE — 87210 SMEAR WET MOUNT SALINE/INK: CPT | Performed by: OBSTETRICS & GYNECOLOGY

## 2018-10-26 PROCEDURE — 99213 OFFICE O/P EST LOW 20 MIN: CPT | Performed by: OBSTETRICS & GYNECOLOGY

## 2018-10-26 PROCEDURE — 87491 CHLMYD TRACH DNA AMP PROBE: CPT | Performed by: OBSTETRICS & GYNECOLOGY

## 2018-10-26 RX ORDER — FLUCONAZOLE 150 MG/1
150 TABLET ORAL DAILY
Qty: 2 TABLET | Refills: 0 | Status: SHIPPED | OUTPATIENT
Start: 2018-10-26 | End: 2018-10-28

## 2018-10-26 NOTE — PROGRESS NOTES
Assessment/Plan:    Screening for STD (sexually transmitted disease)  -     Chlamydia/GC amplified DNA by PCR    Possible pregnancy  -     POCT urine HCG-neg  -  Precertification for the Mirena IUD was sent to [facility] patient desires to have the Mirena replaced  I instructed her to make an appointment for any time after the next 2 weeks, once precert hasbeen completed, for insertion  Vaginal discharge  -     fluconazole (DIFLUCAN) 150 mg tablet; Take 1 tablet (150 mg total) by mouth daily for 2 doses  -     POCT wet mount--yeast, neg trich    Pelvic pain  Comments:  LLQ    -   We will get a gyn ultrasound to evaluate the left lower quadrant  Patient has a history of cysts in the past and has had them when she had the Mirena  The pain seems to wax and wane, the worst being a 6 or 7    currently she has no pain on exam   Gonorrhea/chlamydia probe were done today to rule out any signs of pelvic infection  Subjective   Patient ID: Elia Fam is a 34 y o  female   Who presents today with left lower quadrant pain  Patient states the pain has been waxing and waning over the last several weeks  She did note that her last period did occur 1 week ago but was unusual for her because it only lasted 2 days which is not her norm  She also noticed an odor after menses, which she feels is still present at this time  She does not use condoms for contraception she tries to be careful  She had a recent termination in May of 2018  Patient desires to have her Mirena placed back in  It was accidentally removed when a tampon removal approximately 2 years ago  Patient does have a history of endometriosis as a teenager  Where she underwent a laparoscopy with an ablation of endometriosis at that time  OB History      Para Term  AB Living    5 3 3 0 2 3    SAB TAB Ectopic Multiple Live Births    0 2 0 0 3            Review of Systems   Constitutional: Negative  HENT: Negative  Eyes: Negative  Respiratory: Negative  Cardiovascular: Negative  Gastrointestinal: Negative  Endocrine: Negative  Genitourinary:        See HPI above   Musculoskeletal: Negative  Skin: Negative  Allergic/Immunologic: Negative  Neurological: Negative  Hematological: Negative  Psychiatric/Behavioral: Negative  Vitals:    10/26/18 0837   BP: 130/84         Physical Exam   Constitutional: She appears well-developed and well-nourished  No distress  Genitourinary: Uterus normal  Pelvic exam was performed with patient supine  No bleeding in the vagina  Vaginal discharge found  Right adnexum does not display mass, does not display tenderness and does not display fullness  Left adnexum does not display mass, does not display tenderness and does not display fullness  Cervix does not exhibit motion tenderness or discharge  Uterus is not tender or exhibiting a mass  Neck: Normal range of motion  Pulmonary/Chest: Effort normal  Right breast exhibits no mass, no nipple discharge and no tenderness  Left breast exhibits no mass, no nipple discharge and no tenderness  Abdominal: There is no tenderness  Musculoskeletal: Normal range of motion  Neurological: She is alert  Skin: Skin is warm and dry  Psychiatric: She has a normal mood and affect  Her behavior is normal    Nursing note and vitals reviewed  Menstrual History:  OB History      Para Term  AB Living    5 3 3 0 2 3    SAB TAB Ectopic Multiple Live Births    0 2 0 0 3           Patient's last menstrual period was 10/19/2018 (approximate)  The following portions of the patient's history were reviewed and updated as appropriate: allergies, current medications, past family history, past medical history, past social history, past surgical history and problem list       Counseling / Coordination of Care  Total time spent today20 minutes  minutes    Greater than 50% of total time was spent with the patient and / or family counseling and / or coordination of care

## 2018-10-27 LAB
CHLAMYDIA DNA CVX QL NAA+PROBE: NORMAL
N GONORRHOEA DNA GENITAL QL NAA+PROBE: NORMAL

## 2018-10-29 ENCOUNTER — ULTRASOUND (OUTPATIENT)
Dept: OBGYN CLINIC | Facility: CLINIC | Age: 28
End: 2018-10-29
Payer: COMMERCIAL

## 2018-10-29 DIAGNOSIS — R10.32 LLQ PAIN: Primary | ICD-10-CM

## 2018-10-29 PROCEDURE — 76830 TRANSVAGINAL US NON-OB: CPT | Performed by: OBSTETRICS & GYNECOLOGY

## 2018-10-29 NOTE — PROGRESS NOTES
AMB US Pelvic Non OB  Date/Time: 10/29/2018 7:35 AM  Performed by: Shalom Ledezma  Authorized by: Dylan CLEANING     Procedure details:     Technique:  Transvaginal US, Non-OB    Position: lithotomy exam    Uterine findings:     Diameter (mm):  46    Length (mm):  78    Width (mm):  67    Endometrial stripe: identified      Endometrium thickness (mm):  10 3  Left ovary findings:     Left ovary:  Visualized    Diameter (mm):  30 1    Length (mm):  40 6    Width (mm):  30 1  Right ovary findings:     Right ovary:  Visualized    Diameter (mm):  25 7    Length (mm):  44 9    Width (mm):  33 7  Other findings:     Free pelvic fluid: not identified      Free peritoneal fluid: not identified    Post-Procedure Details:     Impression:  Retroverted uterus and bilateral ovaries appear within normal limits  The left ovary demonstrates two follicles; each 5 4HX  There is no free fluid  Tolerance: Tolerated well, no immediate complications    Complications: no complications    Additional Procedure Comments:      Siemens Acuson X150 EC9-4 transvaginal transducer Serial # [id number] was used to perform the examination today and subsequently followed with high level disinfection utilizing Trophon EPR procedure

## 2018-10-30 ENCOUNTER — PROCEDURE VISIT (OUTPATIENT)
Dept: OBGYN CLINIC | Facility: CLINIC | Age: 28
End: 2018-10-30
Payer: COMMERCIAL

## 2018-10-30 VITALS
DIASTOLIC BLOOD PRESSURE: 80 MMHG | SYSTOLIC BLOOD PRESSURE: 116 MMHG | HEIGHT: 65 IN | BODY MASS INDEX: 41.62 KG/M2 | WEIGHT: 249.8 LBS

## 2018-10-30 DIAGNOSIS — Z30.430 ENCOUNTER FOR INSERTION OF MIRENA IUD: Primary | ICD-10-CM

## 2018-10-30 LAB — SL AMB POCT URINE HCG: NEGATIVE

## 2018-10-30 PROCEDURE — 58300 INSERT INTRAUTERINE DEVICE: CPT | Performed by: OBSTETRICS & GYNECOLOGY

## 2018-10-30 PROCEDURE — 81025 URINE PREGNANCY TEST: CPT | Performed by: OBSTETRICS & GYNECOLOGY

## 2018-10-30 NOTE — PROGRESS NOTES
Assessment/Plan:     Diagnoses and all orders for this visit:    Encounter for insertion of mirena IUD  -     POCT urine HCG--negative  - IUD   Placed without difficulty  Patient was instructed on how to check her strings  She was instructed to check the strings and 2 days, 2 weeks, and will follow up in 4-5 weeks for an IUD check with me  She was given risks of perforation, and signs to look for should anything occur prior to  Her next visit  Patient was instructed to keep a diary of her pain when it occurs and what she is eating during that time, to see if this pain that she is having could be bowel related  Ultrasound was reviewed and discussed with the patient and reassured her that all findings will pelvic were normal     Other orders  -     Iud insertions        Subjective   Patient ID: Lani Sosa is a 34 y o  female   Who presents today for IUD insertion  Patient was seen previously requesting IUD insertion as well as for left lower quadrant pain patient states that she is currently still having some pain that is crampy like  She had an ultrasound yesterday which showed a normal uterus and ovaries ovaries  OB History      Para Term  AB Living    5 3 3 0 2 3    SAB TAB Ectopic Multiple Live Births    0 2 0 0 3            Review of Systems   Constitutional: Negative  HENT: Negative  Eyes: Negative  Respiratory: Negative  Cardiovascular: Negative  Gastrointestinal: Positive for abdominal pain  Endocrine: Negative  Genitourinary:        See HPI above   Musculoskeletal: Negative  Skin: Negative  Allergic/Immunologic: Negative  Neurological: Negative  Hematological: Negative  Psychiatric/Behavioral: Negative  Vitals:    10/30/18 0928   BP: 116/80         Physical Exam   Constitutional: She appears well-developed and well-nourished  No distress     Genitourinary: Vagina normal and uterus normal  Pelvic exam was performed with patient supine  No vaginal discharge found  Right adnexum does not display mass, does not display tenderness and does not display fullness  Left adnexum does not display mass, does not display tenderness and does not display fullness  Cervix does not exhibit motion tenderness or discharge  Uterus is retroverted  Uterus is not tender or exhibiting a mass  Neck: Normal range of motion  Pulmonary/Chest: Effort normal  Right breast exhibits no mass, no nipple discharge and no tenderness  Left breast exhibits no mass, no nipple discharge and no tenderness  Abdominal: There is no tenderness  Musculoskeletal: Normal range of motion  Neurological: She is alert  Skin: Skin is warm and dry  Psychiatric: She has a normal mood and affect  Her behavior is normal    Nursing note and vitals reviewed  Iud insertions  Date/Time: 10/30/2018 10:05 AM  Performed by: Nazanin Malone  Authorized by: Nazanin CLEANING     Consent:     Consent obtained:  Written and verbal    Consent given by:  Patient    Procedure risks and benefits discussed: yes      Patient questions answered: yes      Patient agrees, verbalizes understanding, and wants to proceed: yes      Educational handouts given: yes      Instructions and paperwork completed: yes    Procedure:     Pelvic exam performed: yes      Negative GC/chlamydia test: yes      Negative urine pregnancy test: yes      Cervix cleaned and prepped: yes      Speculum placed in vagina: yes      Tenaculum applied to cervix: yes      Uterus sounded: yes      IUD inserted with no complications: yes      IUD type:  Mirena    Strings trimmed: yes      Uterus sound depth (cm): 8 5    Post-procedure:     Patient tolerated procedure well: yes      Patient will follow up after next period: yes            Menstrual History:  OB History      Para Term  AB Living    5 3 3 0 2 3    SAB TAB Ectopic Multiple Live Births    0 2 0 0 3           Patient's last menstrual period was 10/19/2018 (approximate)  The following portions of the patient's history were reviewed and updated as appropriate: allergies, current medications, past medical history and problem list       Counseling / Coordination of Care  Total time spent today15 minutes  minutes  Greater than 50% of total time was spent with the patient and / or family counseling and / or coordination of care

## 2019-09-03 ENCOUNTER — OFFICE VISIT (OUTPATIENT)
Dept: URGENT CARE | Facility: MEDICAL CENTER | Age: 29
End: 2019-09-03
Payer: COMMERCIAL

## 2019-09-03 VITALS
DIASTOLIC BLOOD PRESSURE: 75 MMHG | HEIGHT: 65 IN | BODY MASS INDEX: 42.39 KG/M2 | TEMPERATURE: 97.3 F | OXYGEN SATURATION: 98 % | RESPIRATION RATE: 16 BRPM | WEIGHT: 254.41 LBS | SYSTOLIC BLOOD PRESSURE: 119 MMHG | HEART RATE: 68 BPM

## 2019-09-03 DIAGNOSIS — H60.501 ACUTE OTITIS EXTERNA OF RIGHT EAR, UNSPECIFIED TYPE: Primary | ICD-10-CM

## 2019-09-03 PROCEDURE — 99214 OFFICE O/P EST MOD 30 MIN: CPT | Performed by: FAMILY MEDICINE

## 2019-09-03 RX ORDER — NEOMYCIN SULFATE, POLYMYXIN B SULFATE AND HYDROCORTISONE 10; 3.5; 1 MG/ML; MG/ML; [USP'U]/ML
4 SUSPENSION/ DROPS AURICULAR (OTIC) 4 TIMES DAILY
Qty: 10 ML | Refills: 0 | Status: SHIPPED | OUTPATIENT
Start: 2019-09-03 | End: 2019-12-08 | Stop reason: ALTCHOICE

## 2019-09-03 NOTE — PROGRESS NOTES
[facility]        NAME: Caleb Strong is a 34 y o  female  : 1990    MRN: [id number]  DATE: September 3, 2019  TIME: 10:43 AM    Assessment and Plan   Acute otitis externa of right ear, unspecified type [H60 501]  1  Acute otitis externa of right ear, unspecified type  neomycin-polymyxin-hydrocortisone (CORTISPORIN) 0 35%-10,000 units/mL-1% otic suspension         Patient Instructions       Follow up with PCP in 3-5 days  Proceed to  ER if symptoms worsen  Chief Complaint     Chief Complaint   Patient presents with   [place] Duarte     Patient relates started right ear pain since Saturday  C/O pain and swelling  Unsure if insect bite  Denies fever  History of Present Illness       22-year-old female presents with right ear pain that started 2 days ago  She states that she was out drinking and she is not sure something better or not  The infection seems to be going to worse the inside of the ear  She denies any upper respiratory infection symptoms or fevers or chills        Review of Systems   Review of Systems  As above     Current Medications       Current Outpatient Medications:     neomycin-polymyxin-hydrocortisone (CORTISPORIN) 0 35%-10,000 units/mL-1% otic suspension, Administer 4 drops to the right ear 4 (four) times a day for 7 days, Disp: 10 mL, Rfl: 0    Current Allergies     Allergies as of 2019 - Reviewed 2019   Allergen Reaction Noted    Shellfish-derived products Anaphylaxis 2016    Other  10/12/2016    Shellfish allergy              The following portions of the patient's history were reviewed and updated as appropriate: allergies, current medications, past family history, past medical history, past social history, past surgical history and problem list      Past Medical History:   Diagnosis Date    Abdominal pain     Anxiety     Asthma     Bipolar disorder ([facility] [address] )     Chronic pain disorder     right hip    Depression     Diarrhea     Endometrial disorder 12/2012    Epigastric pain     Fatigue     Morbid obesity (HCC)     Nausea and vomiting        Past Surgical History:   Procedure Laterality Date    DIAGNOSTIC LAPAROSCOPY      DIAGNOSTIC LAPAROSCOPY      DILATION AND CURETTAGE OF UTERUS      MD COLONOSCOPY FLX DX W/COLLJ SPEC WHEN PFRMD N/A 8/4/2017    Procedure: EGD with bx AND COLONOSCOPY with bx;  Surgeon: Ana Rosa Tierney MD;  Location: [facility]; Service: Gastroenterology       No family history on file  Medications have been verified  Objective   /75   Pulse 68   Temp (!) 97 3 °F (36 3 °C) (Tympanic)   Resp 16   Ht 5' 5" (1 651 m)   Wt 115 kg (254 lb 6 6 oz)   SpO2 98%   BMI 42 34 kg/m²        Physical Exam     Physical Exam   Constitutional: She is oriented to person, place, and time  She appears well-developed and well-nourished  HENT:   Head: Normocephalic and atraumatic  Left Ear: External ear normal    Mouth/Throat: Oropharynx is clear and moist    Tenderness over the right tragus sub mandibular lymph nodes and periauricular lymph nodes  Ear canal appears erythematous and swollen  Tympanic membrane is intact   Eyes: Conjunctivae are normal    Neck: Neck supple  Cardiovascular: Normal rate, regular rhythm and normal heart sounds  Pulmonary/Chest: Effort normal and breath sounds normal  No respiratory distress  She has no wheezes  She has no rales  Abdominal: Soft  Musculoskeletal: Normal range of motion  Neurological: She is alert and oriented to person, place, and time  Skin: Skin is warm and dry  Psychiatric: She has a normal mood and affect  Her behavior is normal    Nursing note and vitals reviewed

## 2019-12-08 ENCOUNTER — APPOINTMENT (EMERGENCY)
Dept: RADIOLOGY | Facility: HOSPITAL | Age: 29
End: 2019-12-08
Payer: COMMERCIAL

## 2019-12-08 ENCOUNTER — HOSPITAL ENCOUNTER (EMERGENCY)
Facility: HOSPITAL | Age: 29
Discharge: HOME | End: 2019-12-08
Attending: EMERGENCY MEDICINE | Admitting: EMERGENCY MEDICINE
Payer: COMMERCIAL

## 2019-12-08 VITALS
SYSTOLIC BLOOD PRESSURE: 108 MMHG | WEIGHT: 238.98 LBS | HEART RATE: 80 BPM | DIASTOLIC BLOOD PRESSURE: 66 MMHG | BODY MASS INDEX: 39.77 KG/M2 | OXYGEN SATURATION: 100 % | TEMPERATURE: 97.9 F | RESPIRATION RATE: 16 BRPM

## 2019-12-08 DIAGNOSIS — S63.642A SPRAIN OF METACARPOPHALANGEAL (MCP) JOINT OF LEFT THUMB, INITIAL ENCOUNTER: Primary | ICD-10-CM

## 2019-12-08 PROCEDURE — 99282 EMERGENCY DEPT VISIT SF MDM: CPT | Performed by: EMERGENCY MEDICINE

## 2019-12-08 PROCEDURE — 73130 X-RAY EXAM OF HAND: CPT

## 2019-12-08 PROCEDURE — 99283 EMERGENCY DEPT VISIT LOW MDM: CPT

## 2019-12-08 NOTE — ED PROVIDER NOTES
History  Chief Complaint   Patient presents with    Thumb Injury     left thumb injury "bent my thumb back a month ago and it hasn't healed"      Pt is a 34year old female presenting with left thumb pain x 1 month  Pt states her thumb became hyperextended in a door frame and has not healed since  States originally she had swelling at the base of her thumb but the swelling has dissipated  States the pain is at the MCP and PIP joint, not DIP  No erythema or ecchymosis  Denies fevers, n/v/d  Able to flex and extend against resistance  None       Past Medical History:   Diagnosis Date    Abdominal pain     Anxiety     Asthma     Bipolar disorder (HCC)     Chronic pain disorder     right hip    Depression     Diarrhea     Endometrial disorder 12/2012    Epigastric pain     Fatigue     Morbid obesity (HCC)     Nausea and vomiting        Past Surgical History:   Procedure Laterality Date    DIAGNOSTIC LAPAROSCOPY      DIAGNOSTIC LAPAROSCOPY      DILATION AND CURETTAGE OF UTERUS      VT COLONOSCOPY FLX DX W/COLLJ SPEC WHEN PFRMD N/A 8/4/2017    Procedure: EGD with bx AND COLONOSCOPY with bx;  Surgeon: Louise Casey MD;  Location: [facility]; Service: Gastroenterology       History reviewed  No pertinent family history  I have reviewed and agree with the history as documented  Social History     Tobacco Use    Smoking status: Current Every Day Smoker     Packs/day: 1 00    Smokeless tobacco: Never Used   Substance Use Topics    Alcohol use: Yes     Comment: Socially    Drug use: No        Review of Systems   Constitutional: Negative for chills, diaphoresis and fever  Respiratory: Negative for cough, chest tightness and shortness of breath  Cardiovascular: Negative for chest pain  Gastrointestinal: Negative for abdominal pain, constipation, diarrhea, nausea and vomiting  Genitourinary: Negative for decreased urine volume and difficulty urinating     Musculoskeletal: Positive for arthralgias and joint swelling  Skin: Negative for color change  Neurological: Negative for dizziness and light-headedness  Physical Exam  Physical Exam   Constitutional: She is oriented to person, place, and time  She appears well-developed and well-nourished  No distress  HENT:   Head: Normocephalic and atraumatic  Right Ear: External ear normal    Left Ear: External ear normal    Nose: Nose normal    Eyes: Conjunctivae and EOM are normal    Neck: Normal range of motion  Neck supple  Cardiovascular: Normal rate, regular rhythm, normal heart sounds and intact distal pulses  Pulses:       Radial pulses are 2+ on the right side, and 2+ on the left side  Pulmonary/Chest: Effort normal and breath sounds normal    Musculoskeletal: Normal range of motion  Left hand: She exhibits tenderness and bony tenderness  She exhibits normal range of motion, normal two-point discrimination, normal capillary refill, no deformity, no laceration and no swelling  Normal sensation noted  Normal strength noted  TTP at left thumb MCP joint and PIP  No erythema, swelling, ecchymosis  Able to flex and extend against resistance  Neurovascularly in tact distally  Neurological: She is alert and oriented to person, place, and time  She has normal strength  No sensory deficit  Skin: Skin is warm and dry  She is not diaphoretic         Vital Signs  ED Triage Vitals [12/08/19 1229]   Temperature Pulse Respirations Blood Pressure SpO2   97 9 °F (36 6 °C) 80 16 108/66 100 %      Temp Source Heart Rate Source Patient Position - Orthostatic VS BP Location FiO2 (%)   Temporal -- Sitting Right arm --      Pain Score       6           Vitals:    12/08/19 1229   BP: 108/66   Pulse: 80   Patient Position - Orthostatic VS: Sitting         Visual Acuity      ED Medications  Medications - No data to display    Diagnostic Studies  Results Reviewed     None                 XR hand 3+ views LEFT   ED Interpretation by Federico Rodriguez Jj Infante PA-C (12/08 1257)   No acute osseus findings                 Procedures  Procedures         ED Course                               MDM      Disposition  Final diagnoses:   Sprain of metacarpophalangeal (MCP) joint of left thumb, initial encounter     Time reflects when diagnosis was documented in both MDM as applicable and the Disposition within this note     Time User Action Codes Description Comment    12/8/2019  1:03 PM Vijay Hoskins [I21 [id number]] Sprain of metacarpophalangeal (MCP) joint of left thumb, initial encounter       ED Disposition     ED Disposition Condition Date/Time Comment    Discharge Good Sun Dec 8, 2019  1:03 PM Shraddha Tidwell discharge to home/self care  Follow-up Information     Follow up With Specialties Details Why Contact Info Additional Information    Nilesh Garcia MD Family Medicine Schedule an appointment as soon as possible for a visit today  [address] [facility]  [address] [facility] [address]  [phone]       [address] Orthopedic Surgery Schedule an appointment as soon as possible for a visit  As needed [id number] [facility] [id number]  [address] [place] [address], [address] [place] [address], [id number] [phone]          Patient's Medications   Discharge Prescriptions    No medications on file     No discharge procedures on file      ED Provider  Electronically Signed by           Eugenio Sheffield PA-C  12/08/19 6674

## 2019-12-17 ENCOUNTER — HOSPITAL ENCOUNTER (EMERGENCY)
Facility: HOSPITAL | Age: 29
Discharge: HOME | End: 2019-12-17
Attending: EMERGENCY MEDICINE | Admitting: EMERGENCY MEDICINE
Payer: COMMERCIAL

## 2019-12-17 VITALS
DIASTOLIC BLOOD PRESSURE: 79 MMHG | SYSTOLIC BLOOD PRESSURE: 125 MMHG | TEMPERATURE: 97.5 F | HEART RATE: 75 BPM | OXYGEN SATURATION: 97 % | RESPIRATION RATE: 20 BRPM | WEIGHT: 238.54 LBS | BODY MASS INDEX: 39.69 KG/M2

## 2019-12-17 DIAGNOSIS — R51.9 HEADACHE: ICD-10-CM

## 2019-12-17 DIAGNOSIS — R11.0 NAUSEA: Primary | ICD-10-CM

## 2019-12-17 LAB
EXT PREG TEST URINE: NEGATIVE
EXT. CONTROL ED NAV: NORMAL

## 2019-12-17 PROCEDURE — 81025 URINE PREGNANCY TEST: CPT | Performed by: STUDENT IN AN ORGANIZED HEALTH CARE EDUCATION/TRAINING PROGRAM

## 2019-12-17 PROCEDURE — 99283 EMERGENCY DEPT VISIT LOW MDM: CPT

## 2019-12-17 PROCEDURE — 99283 EMERGENCY DEPT VISIT LOW MDM: CPT | Performed by: EMERGENCY MEDICINE

## 2019-12-17 RX ORDER — ONDANSETRON 4 MG/1
4 TABLET, FILM COATED ORAL EVERY 6 HOURS
Qty: 12 TABLET | Refills: 0 | Status: SHIPPED | OUTPATIENT
Start: 2019-12-17 | End: 2020-02-18 | Stop reason: ALTCHOICE

## 2019-12-17 RX ORDER — ACETAMINOPHEN 325 MG/1
975 TABLET ORAL ONCE
Status: COMPLETED | OUTPATIENT
Start: 2019-12-17 | End: 2019-12-17

## 2019-12-17 RX ORDER — ONDANSETRON 4 MG/1
4 TABLET, ORALLY DISINTEGRATING ORAL ONCE
Status: COMPLETED | OUTPATIENT
Start: 2019-12-17 | End: 2019-12-17

## 2019-12-17 RX ORDER — IBUPROFEN 600 MG/1
600 TABLET ORAL ONCE
Status: COMPLETED | OUTPATIENT
Start: 2019-12-17 | End: 2019-12-17

## 2019-12-17 RX ADMIN — IBUPROFEN 600 MG: 600 TABLET ORAL at 22:24

## 2019-12-17 RX ADMIN — ONDANSETRON 4 MG: 4 TABLET, ORALLY DISINTEGRATING ORAL at 22:25

## 2019-12-17 RX ADMIN — ACETAMINOPHEN 975 MG: 325 TABLET ORAL at 22:25

## 2019-12-18 NOTE — DISCHARGE INSTRUCTIONS
Please follow-up with your primary care physician should her symptoms continue  Return to the emergency department for any worsening or otherwise concerning symptoms

## 2019-12-18 NOTE — ED PROVIDER NOTES
History  Chief Complaint   Patient presents with    Medical Problem     pt states that she just removed a tampon approx 1 hour PTA that she had forgotten about for 2 days  no c/o intermittent "grogginess"  also c/o headache  and nausea     This is a 28-year-old female with past medical history of depression/anxiety who presents to the emergency department this evening after she was told to come here by urgent care  Patient states that she rarely gets her period now that she is on a mirena IUD but she did get her period recently and used a tampon  Patient states that she completely forgot that she had in until two days later (today) when she promptly removed it  Patient was feeling fine for a while but eventually developed some nausea and a headache  She went to urgent care for further evaluation but they said that they could not do anything for her there and she should come to the emergency department  Patient has not taken any medications for his symptoms up to this point  She states that she is not concerned that the tampon is still inserted and she is confident that she got the entire thing removed  None       Past Medical History:   Diagnosis Date    Abdominal pain     Anxiety     Asthma     Bipolar disorder (HCC)     Chronic pain disorder     right hip    Depression     Diarrhea     Endometrial disorder 12/2012    Epigastric pain     Fatigue     Morbid obesity (HCC)     Nausea and vomiting        Past Surgical History:   Procedure Laterality Date    DIAGNOSTIC LAPAROSCOPY      DIAGNOSTIC LAPAROSCOPY      DILATION AND CURETTAGE OF UTERUS      NM COLONOSCOPY FLX DX W/COLLJ SPEC WHEN PFRMD N/A 8/4/2017    Procedure: EGD with bx AND COLONOSCOPY with bx;  Surgeon: Jose Enrique Hernández MD;  Location: [facility]; Service: Gastroenterology       History reviewed  No pertinent family history  I have reviewed and agree with the history as documented      Social History     Tobacco Use    Smoking status: Current Every Day Smoker     Packs/day: 0 50    Smokeless tobacco: Never Used   Substance Use Topics    Alcohol use: Not Currently     Comment: Socially    Drug use: No        Review of Systems   Constitutional: Negative for chills, fatigue and fever  HENT: Negative for congestion, rhinorrhea, sinus pressure and sore throat  Eyes: Negative for visual disturbance  Respiratory: Negative for cough and shortness of breath  Cardiovascular: Negative for chest pain  Gastrointestinal: Negative for abdominal pain, constipation, diarrhea, nausea and vomiting  Genitourinary: Negative for dysuria, frequency, hematuria and urgency  Musculoskeletal: Negative for arthralgias and myalgias  Skin: Negative for color change and rash  Neurological: Negative for dizziness, light-headedness and numbness  Physical Exam  ED Triage Vitals [12/17/19 2121]   Temperature Pulse Respirations Blood Pressure SpO2   97 5 °F (36 4 °C) 75 20 125/79 97 %      Temp Source Heart Rate Source Patient Position - Orthostatic VS BP Location FiO2 (%)   Temporal Monitor Sitting Right arm --      Pain Score       No Pain             Orthostatic Vital Signs  Vitals:    12/17/19 2121   BP: 125/79   Pulse: 75   Patient Position - Orthostatic VS: Sitting       Physical Exam   Constitutional: She is oriented to person, place, and time  She appears well-developed and well-nourished  No distress  HENT:   Head: Normocephalic and atraumatic  Eyes: Pupils are equal, round, and reactive to light  Conjunctivae and EOM are normal  Right eye exhibits no discharge  Left eye exhibits no discharge  No scleral icterus  Neck: Normal range of motion  Neck supple  No JVD present  Cardiovascular: Normal rate, regular rhythm and normal heart sounds  Exam reveals no gallop and no friction rub  No murmur heard  Pulmonary/Chest: Effort normal and breath sounds normal  No stridor  No respiratory distress  She has no wheezes   She has no rales    Abdominal: Soft  Bowel sounds are normal  She exhibits no distension  There is no tenderness  There is no guarding  Musculoskeletal: Normal range of motion  She exhibits no edema, tenderness or deformity  Neurological: She is alert and oriented to person, place, and time  No cranial nerve deficit or sensory deficit  She exhibits normal muscle tone  Skin: Skin is warm and dry  No rash noted  She is not diaphoretic  No erythema  No pallor  Psychiatric: She has a normal mood and affect  Her behavior is normal    Nursing note and vitals reviewed  ED Medications  Medications   ondansetron (ZOFRAN-ODT) dispersible tablet 4 mg (4 mg Oral Given 12/17/19 2225)   acetaminophen (TYLENOL) tablet 975 mg (975 mg Oral Given 12/17/19 2225)   ibuprofen (MOTRIN) tablet 600 mg (600 mg Oral Given 12/17/19 2224)       Diagnostic Studies  Results Reviewed     Procedure Component Value Units Date/Time    POCT pregnancy, urine [[id number]]  (Normal) Resulted:  12/17/19 2223    Lab Status:  Final result Updated:  12/17/19 2223     EXT PREG TEST UR (Ref: Negative) negative     Control valid                 No orders to display         Procedures  Procedures      ED Course                               MDM  Number of Diagnoses or Management Options  Headache:   Nausea:   Diagnosis management comments: Patient given Tylenol, Zofran, and ibuprofen here in the emergency department and discharged home in good condition with instructions to follow up with the primary care physician if symptoms continue or return to the emergency department should any worsening or otherwise concerning symptoms develop  Patient was sent home with a prescription for Zofran          Disposition  Final diagnoses:   Nausea   Headache     Time reflects when diagnosis was documented in both MDM as applicable and the Disposition within this note     Time User Action Codes Description Comment    12/17/2019 10:38 PM Shea Trujillo Add [R11 0] Nausea 12/17/2019 10:38 PM Mk Kee Add [R51] Headache       ED Disposition     ED Disposition Condition Date/Time Comment    Discharge Stable Tue Dec 17, 2019 10:38 PM Anibal Tidwell discharge to home/self care  Follow-up Information     Follow up With Specialties Details Why Contact Info Additional Information    Jeff Machuca MD [facility]   [address]  [address] [facility]  [address]        [facility] Emergency Department Emergency Medicine  If symptoms worsen [facility] [id number]  [phone] [facility], [address], [place], [address]          Discharge Medication List as of 12/17/2019 10:40 PM      START taking these medications    Details   ondansetron (ZOFRAN) 4 mg tablet Take 1 tablet (4 mg total) by mouth every 6 (six) hours, Starting Tue 12/17/2019, Print           No discharge procedures on file  ED Provider  Attending physically available and evaluated Anibal Tidwell  I managed the patient along with the ED Attending      Electronically Signed by         Marshall Gama MD  12/18/19 [id number]

## 2019-12-18 NOTE — ED ATTENDING ATTESTATION
12/17/2019  IWenceslao MD, saw and evaluated the patient  I have discussed the patient with the resident/non-physician practitioner and agree with the resident's/non-physician practitioner's findings, Plan of Care, and MDM as documented in the resident's/non-physician practitioner's note, except where noted  All available labs and Radiology studies were reviewed  I was present for key portions of any procedure(s) performed by the resident/non-physician practitioner and I was immediately available to provide assistance  At this point I agree with the current assessment done in the Emergency Department  I have conducted an independent evaluation of this patient a history and physical is as follows:    33 y/o F presents for evaluation of mild nondescript ha and nausea after removing a tampon she forgot was in for 2 days  No abd pain, vaginal complaints, urinary complaints, neck pain/stiffness, focal neuro deficits/weakness  10 systems reviewed and otherwise neg  On exam nad, heent nml, lungs nml, cardiac nml, abd nml    MDM: mild nondescript ha and nausea w/ a benign exam-will do urine dip, upreg, tx symptoms       ED Course         Critical Care Time  Procedures

## 2020-01-21 ENCOUNTER — OFFICE VISIT (OUTPATIENT)
Dept: OBGYN CLINIC | Facility: CLINIC | Age: 30
End: 2020-01-21
Payer: COMMERCIAL

## 2020-01-21 VITALS — WEIGHT: 237 LBS | DIASTOLIC BLOOD PRESSURE: 68 MMHG | SYSTOLIC BLOOD PRESSURE: 110 MMHG | BODY MASS INDEX: 39.44 KG/M2

## 2020-01-21 DIAGNOSIS — B37.9 YEAST INFECTION: ICD-10-CM

## 2020-01-21 DIAGNOSIS — Z97.5 IUD CONTRACEPTION: ICD-10-CM

## 2020-01-21 DIAGNOSIS — Z11.3 SCREENING FOR STD (SEXUALLY TRANSMITTED DISEASE): Primary | ICD-10-CM

## 2020-01-21 DIAGNOSIS — N91.4 SECONDARY OLIGOMENORRHEA: ICD-10-CM

## 2020-01-21 DIAGNOSIS — N76.0 BACTERIAL VAGINOSIS: ICD-10-CM

## 2020-01-21 DIAGNOSIS — B96.89 BACTERIAL VAGINOSIS: ICD-10-CM

## 2020-01-21 PROBLEM — R10.9 ABDOMINAL PAIN: Status: ACTIVE | Noted: 2017-07-12

## 2020-01-21 LAB
BV WHIFF TEST VAG QL: POSITIVE
CLUE CELLS SPEC QL WET PREP: POSITIVE
PH SMN: ABNORMAL [PH]
SL AMB POCT WET MOUNT: YES
T VAGINALIS VAG QL WET PREP: NEGATIVE
YEAST VAG QL WET PREP: ABNORMAL

## 2020-01-21 PROCEDURE — 87491 CHLMYD TRACH DNA AMP PROBE: CPT | Performed by: OBSTETRICS & GYNECOLOGY

## 2020-01-21 PROCEDURE — 87210 SMEAR WET MOUNT SALINE/INK: CPT | Performed by: OBSTETRICS & GYNECOLOGY

## 2020-01-21 PROCEDURE — 99214 OFFICE O/P EST MOD 30 MIN: CPT | Performed by: OBSTETRICS & GYNECOLOGY

## 2020-01-21 PROCEDURE — 87591 N.GONORRHOEAE DNA AMP PROB: CPT | Performed by: OBSTETRICS & GYNECOLOGY

## 2020-01-21 RX ORDER — FLUCONAZOLE 150 MG/1
150 TABLET ORAL ONCE
Qty: 2 TABLET | Refills: 0 | Status: SHIPPED | OUTPATIENT
Start: 2020-01-21 | End: 2020-01-21

## 2020-01-21 RX ORDER — METRONIDAZOLE 7.5 MG/G
1 GEL VAGINAL DAILY
Qty: 70 G | Refills: 0 | Status: SHIPPED | OUTPATIENT
Start: 2020-01-21 | End: 2020-02-18 | Stop reason: ALTCHOICE

## 2020-01-21 NOTE — PROGRESS NOTES
Assessment/Plan   Diagnoses and all orders for this visit:    Screening for STD (sexually transmitted disease)  -     Chlamydia/GC amplified DNA by PCR  -     HIV 1/2 AG-AB combo; Future  -     RPR; Future  -     Hepatitis panel, acute; Future    Yeast infection    Bacterial vaginosis    Secondary oligomenorrhea    IUD contraception     1  Bacterial vaginosis with yeast-patient notes 2 day history of vaginal odor  Exam and wet prep were consistent with BV and small amount of yeast as noted on wet prep  Treatment options were reviewed  Electronic prescription for Metrogel vaginal to take nightly for 5 nights was sent a local pharmacy along with fluconazole 150 mg p o  X1 with repeat in 1 week time as needed  She will call or return with persistent symptoms  2  STD concerns-patient with new partner for the past 1 5 months or so  She is not using condoms  GC/chlamydia testing was done today  Laboratory sheet for HIV, RPR, and hepatitis panel was given  Encouraged use condoms as needed  3  Mirena IUD-placed 10/30/18  In good position on exam   4  Secondary oligomenorrhea-patient with light bleeding for 1-2 days every few months or so  She will call with any issues  5  History of pelvic pain/endometriosis/irregular bleeding-no current concerns  6  Migraine headache history-denies any current concerns  7  Other-her children are ages 10/5/3  My congratulations were given  She did have a prior IUD which fell out after some time  Her last Pap was January 2015  To return in the next few weeks for yearly exam or as needed  Subjective   Patient ID: Joy Baxter is a 34 y o  female  Vitals:    01/21/20 1603   BP: 110/68     Patient was seen today for follow-up visit  Please see assessment plan for details        The following portions of the patient's history were reviewed and updated as appropriate: allergies, current medications, past family history, past medical history, past social history, past surgical history and problem list   Past Medical History:   Diagnosis Date    Abdominal pain     Anxiety     Asthma     Bipolar disorder (HCC)     Chronic pain disorder     right hip    Depression     Diarrhea     Endometrial disorder 2012    Epigastric pain     Fatigue     Morbid obesity (HCC)     Nausea and vomiting      Past Surgical History:   Procedure Laterality Date    DIAGNOSTIC LAPAROSCOPY      DIAGNOSTIC LAPAROSCOPY      DILATION AND CURETTAGE OF UTERUS      CO COLONOSCOPY FLX DX W/COLLJ SPEC WHEN PFRMD N/A 2017    Procedure: EGD with bx AND COLONOSCOPY with bx;  Surgeon: Fátima Manning MD;  Location: [facility]; Service: Gastroenterology     OB History    Para Term  AB Living   5 3 3 0 2 3   SAB TAB Ectopic Multiple Live Births   0 2 0 0 3      # Outcome Date GA Lbr Gregor/2nd Weight Sex Delivery Anes PTL Lv   5 TAB 18           4 Term 16 40w2d 08:55 / 00:07 3590 g (7 lb 14 6 oz) F Vag-Spont None  SRINI   3 Term 14    F Vag-Spont None  SRINI   2 Term 01/20/10    M Vag-Spont None  SRINI      Complications:  Other Excessive Bleeding   1 TAB                Current Outpatient Medications:     ondansetron (ZOFRAN) 4 mg tablet, Take 1 tablet (4 mg total) by mouth every 6 (six) hours (Patient not taking: Reported on 2020), Disp: 12 tablet, Rfl: 0  Allergies   Allergen Reactions    Shellfish-Derived Products Anaphylaxis    Other      Adhesive tape , fruits    Shellfish Allergy      Social History     Socioeconomic History    Marital status: Single     Spouse name: None    Number of children: None    Years of education: None    Highest education level: None   Occupational History    None   Social Needs    Financial resource strain: None    Food insecurity:     Worry: None     Inability: None    Transportation needs:     Medical: None     Non-medical: None   Tobacco Use    Smoking status: Current Every Day Smoker     Packs/day: 0 50    Smokeless tobacco: Never Used   Substance and Sexual Activity    Alcohol use: Not Currently     Comment: Socially    Drug use: No    Sexual activity: Yes     Partners: Male     Birth control/protection: None   Lifestyle    Physical activity:     Days per week: None     Minutes per session: None    Stress: None   Relationships    Social connections:     Talks on phone: None     Gets together: None     Attends Evangelical service: None     Active member of club or organization: None     Attends meetings of clubs or organizations: None     Relationship status: None    Intimate partner violence:     Fear of current or ex partner: None     Emotionally abused: None     Physically abused: None     Forced sexual activity: None   Other Topics Concern    None   Social History Narrative    None     No family history on file  Review of Systems   Constitutional: Negative for chills, diaphoresis, fatigue and fever  Respiratory: Negative for apnea, cough, chest tightness, shortness of breath and wheezing  Cardiovascular: Negative for chest pain, palpitations and leg swelling  Gastrointestinal: Negative for abdominal distention, abdominal pain, anal bleeding, constipation, diarrhea, nausea, rectal pain and vomiting  Genitourinary: Negative for difficulty urinating, dyspareunia, dysuria, frequency, hematuria, menstrual problem, pelvic pain, urgency, vaginal bleeding, vaginal discharge and vaginal pain  Musculoskeletal: Negative for arthralgias, back pain and myalgias  Skin: Negative for color change and rash  Neurological: Negative for dizziness, syncope, light-headedness, numbness and headaches  Hematological: Negative for adenopathy  Does not bruise/bleed easily  Psychiatric/Behavioral: Negative for dysphoric mood and sleep disturbance  The patient is not nervous/anxious      Vaginal odor    Objective   Physical Exam    Objective      /68 (BP Location: Left arm, Patient Position: Sitting, Cuff Size: Standard)   Wt 108 kg (237 lb)   BMI 39 44 kg/m²     General:   alert and oriented, in no acute distress   Neck:    Breast:    Heart:    Lungs:    Abdomen: soft, non-tender, without masses or organomegaly   Vulva: normal   Vagina: Small amount of white discharge, without erythema or lesions appreciated   Cervix: Small amount of white discharge, without lesions or cervicitis  No Cervical motion tenderness    IUD string seen at a length of 2-2 5 cm   Uterus: top normal size, anteverted, non-tender   Adnexa: no mass, fullness, tenderness   Rectum: deferred    Psych:  Normal mood and affect   Skin:  Without obvious lesions   Eyes: symmetric, with normal movements and reactivity   Musculoskeletal:  Normal muscle tone and movements appreciated

## 2020-01-21 NOTE — PATIENT INSTRUCTIONS
Vulvovaginal Candidiasis   WHAT YOU NEED TO KNOW:   What is vulvovaginal candidiasis? Vulvovaginal candidiasis, or yeast infection, is a common vaginal infection  Vulvovaginal candidiasis is caused by a fungus, or yeast-like germ  Fungi are normally found in your vagina  When there are too many fungi, it can cause an infection  What increases my risk for vulvovaginal candidiasis? · Pregnancy    · Medical conditions that suppress your immune system, such as diabetes or HIV and AIDS    · Medicines, such as antibiotics, birth control pills, or steroid medicine    · Contraceptive devices, such as diaphragms, sponges, and intrauterine devices  What are the signs and symptoms of vulvovaginal candidiasis? · Thick, white, cheese-like discharge from your vagina    · Itching, swelling, or redness in your vagina    · Burning when you urinate  How is vulvovaginal candidiasis diagnosed? Your healthcare provider will ask about your medical history and examine you  A sample of your vaginal discharge may show what germ is causing your infection  How is vulvovaginal candidiasis treated? Medicines help treat the fungal infection and decrease inflammation  The medicine may be a pill, topical cream, or vaginal suppository  With treatment, the infection is usually gone within a week: How can I manage my symptoms? · Wear cotton underwear  · Keep the vaginal area clean and dry  · Wipe from front to back after you urinate or have a bowel movement  · Do not have sex until your symptoms are gone  · Do not douche  · Do not use strong perfumes or soaps  · Do not use feminine hygiene sprays, powders, or bubble bath  How can I prevent another infection? · Take showers instead of baths  · Eat yogurt  · Limit the amount of alcohol you drink  · Limit your time in hot tubs  · Control your blood sugar if you are diabetic  When should I seek immediate care? · You have fever and chills      · You are bleeding from your vagina and it is not your monthly period  · You develop abdominal or pelvic pain  When should I contact my healthcare provider? · Your signs and symptoms get worse, even after treatment  · You have questions or concerns about your condition or care  CARE AGREEMENT:   You have the right to help plan your care  Learn about your health condition and how it may be treated  Discuss treatment options with your caregivers to decide what care you want to receive  You always have the right to refuse treatment  The above information is an  only  It is not intended as medical advice for individual conditions or treatments  Talk to your doctor, nurse or pharmacist before following any medical regimen to see if it is safe and effective for you  © 2017 [address] Information is for End User's use only and may not be sold, redistributed or otherwise used for commercial purposes  All illustrations and images included in CareNotes® are the copyrighted property of [employer]  or [employer]ilia [employer]grath  Bacterial Vaginosis   WHAT YOU NEED TO KNOW:   What is bacterial vaginosis? Bacterial vaginosis (BV) is an infection in the vagina  It may cause vaginitis, which is irritation and inflammation of the vagina  What causes bacterial vaginosis? The cause of BV is not known  With BV, there is an imbalance in bacteria normally found in the vagina  Your risk for BV increases if you are sexually active  Your risk for BV also increases if you douche or have an intrauterine device (IUD)  What are the signs and symptoms of bacterial vaginosis? Some women have no symptoms  You may have the following:  · White, gray, or yellow vaginal discharge    · Vaginal discharge that smells like fish    · Itching or burning around the outside of your vagina  How is bacterial vaginosis diagnosed? Your healthcare provider will examine you and ask if you have other health conditions   He may need to take a sample of fluid from your vagina  This will be tested for the bacteria that causes BV  How is bacterial vaginosis treated? Antibiotics are given to kill the bacteria that cause BV  They may be given as a pill or as a cream to put in your vagina  Take or use as directed  What are the risks of bacterial vaginosis? If untreated, BV may spread and lead to serious infections in your uterus and fallopian tubes  This can make it more difficult to get pregnant  BV increases your risk for other sexually transmitted infections (STIs), such as chlamydia, gonorrhea, or HIV  How can I prevent bacterial vaginosis? · Keep your vaginal area clean and dry:  Wear underwear and pantyhose with a cotton crotch  Wipe from front to back after you urinate or have a bowel movement  After bathing, rinse soap from your vaginal area to decrease your risk for irritation  · Do not use products that cause irritation:  Always use unscented tampons or sanitary pads  Do not use feminine sprays, powders, or scented tampons because they may cause irritation and increase your risk of BV  Detergents and fabric softeners may also cause irritation  · Do not douche: This can cause an imbalance in healthy vaginal bacteria  · Use latex condoms: This helps prevent another infection and keeps your partner from getting the infection  When should I contact my healthcare provider? · Your symptoms come back or do not improve with treatment  · You have vaginal bleeding that is not your monthly period  · You have questions or concerns about your condition or care  CARE AGREEMENT:   You have the right to help plan your care  Learn about your health condition and how it may be treated  Discuss treatment options with your caregivers to decide what care you want to receive  You always have the right to refuse treatment  The above information is an  only   It is not intended as medical advice for individual conditions or treatments  Talk to your doctor, nurse or pharmacist before following any medical regimen to see if it is safe and effective for you  © 2017 [address] Information is for End User's use only and may not be sold, redistributed or otherwise used for commercial purposes  All illustrations and images included in CareNotes® are the copyrighted property of [employer]  or [employer]gr[employer]

## 2020-01-23 LAB
C TRACH DNA SPEC QL NAA+PROBE: NEGATIVE
N GONORRHOEA DNA SPEC QL NAA+PROBE: NEGATIVE

## 2020-02-18 ENCOUNTER — ANNUAL EXAM (OUTPATIENT)
Dept: OBGYN CLINIC | Facility: CLINIC | Age: 30
End: 2020-02-18
Payer: COMMERCIAL

## 2020-02-18 VITALS
DIASTOLIC BLOOD PRESSURE: 72 MMHG | HEIGHT: 65 IN | SYSTOLIC BLOOD PRESSURE: 130 MMHG | WEIGHT: 235 LBS | BODY MASS INDEX: 39.15 KG/M2

## 2020-02-18 DIAGNOSIS — N91.4 SECONDARY OLIGOMENORRHEA: ICD-10-CM

## 2020-02-18 DIAGNOSIS — B96.89 BACTERIAL VAGINOSIS: ICD-10-CM

## 2020-02-18 DIAGNOSIS — N80.9 ENDOMETRIOSIS: ICD-10-CM

## 2020-02-18 DIAGNOSIS — Z12.4 SCREENING FOR CERVICAL CANCER: Primary | ICD-10-CM

## 2020-02-18 DIAGNOSIS — N76.0 BACTERIAL VAGINOSIS: ICD-10-CM

## 2020-02-18 DIAGNOSIS — B37.3 VAGINAL CANDIDIASIS: ICD-10-CM

## 2020-02-18 DIAGNOSIS — Z97.5 IUD CONTRACEPTION: ICD-10-CM

## 2020-02-18 LAB
BV WHIFF TEST VAG QL: NEGATIVE
CLUE CELLS SPEC QL WET PREP: POSITIVE
PH SMN: 4.5 [PH]
SL AMB POCT WET MOUNT: YES
T VAGINALIS VAG QL WET PREP: NEGATIVE
YEAST VAG QL WET PREP: POSITIVE

## 2020-02-18 PROCEDURE — 87210 SMEAR WET MOUNT SALINE/INK: CPT | Performed by: OBSTETRICS & GYNECOLOGY

## 2020-02-18 PROCEDURE — 99214 OFFICE O/P EST MOD 30 MIN: CPT | Performed by: OBSTETRICS & GYNECOLOGY

## 2020-02-18 PROCEDURE — G0145 SCR C/V CYTO,THINLAYER,RESCR: HCPCS | Performed by: OBSTETRICS & GYNECOLOGY

## 2020-02-18 RX ORDER — TINIDAZOLE 500 MG/1
1000 TABLET ORAL
Qty: 10 TABLET | Refills: 0 | Status: SHIPPED | OUTPATIENT
Start: 2020-02-18 | End: 2020-02-23

## 2020-02-18 RX ORDER — FLUCONAZOLE 150 MG/1
150 TABLET ORAL ONCE
Qty: 2 TABLET | Refills: 0 | Status: SHIPPED | OUTPATIENT
Start: 2020-02-18 | End: 2020-02-18

## 2020-02-18 NOTE — PATIENT INSTRUCTIONS
Vulvovaginal Candidiasis   WHAT YOU NEED TO KNOW:   What is vulvovaginal candidiasis? Vulvovaginal candidiasis, or yeast infection, is a common vaginal infection  Vulvovaginal candidiasis is caused by a fungus, or yeast-like germ  Fungi are normally found in your vagina  When there are too many fungi, it can cause an infection  What increases my risk for vulvovaginal candidiasis? · Pregnancy    · Medical conditions that suppress your immune system, such as diabetes or HIV and AIDS    · Medicines, such as antibiotics, birth control pills, or steroid medicine    · Contraceptive devices, such as diaphragms, sponges, and intrauterine devices  What are the signs and symptoms of vulvovaginal candidiasis? · Thick, white, cheese-like discharge from your vagina    · Itching, swelling, or redness in your vagina    · Burning when you urinate  How is vulvovaginal candidiasis diagnosed? Your healthcare provider will ask about your medical history and examine you  A sample of your vaginal discharge may show what germ is causing your infection  How is vulvovaginal candidiasis treated? Medicines help treat the fungal infection and decrease inflammation  The medicine may be a pill, topical cream, or vaginal suppository  With treatment, the infection is usually gone within a week: How can I manage my symptoms? · Wear cotton underwear  · Keep the vaginal area clean and dry  · Wipe from front to back after you urinate or have a bowel movement  · Do not have sex until your symptoms are gone  · Do not douche  · Do not use strong perfumes or soaps  · Do not use feminine hygiene sprays, powders, or bubble bath  How can I prevent another infection? · Take showers instead of baths  · Eat yogurt  · Limit the amount of alcohol you drink  · Limit your time in hot tubs  · Control your blood sugar if you are diabetic  When should I seek immediate care? · You have fever and chills      · You are bleeding from your vagina and it is not your monthly period  · You develop abdominal or pelvic pain  When should I contact my healthcare provider? · Your signs and symptoms get worse, even after treatment  · You have questions or concerns about your condition or care  CARE AGREEMENT:   You have the right to help plan your care  Learn about your health condition and how it may be treated  Discuss treatment options with your caregivers to decide what care you want to receive  You always have the right to refuse treatment  The above information is an  only  It is not intended as medical advice for individual conditions or treatments  Talk to your doctor, nurse or pharmacist before following any medical regimen to see if it is safe and effective for you  © 2017 [address] Information is for End User's use only and may not be sold, redistributed or otherwise used for commercial purposes  All illustrations and images included in CareNotes® are the copyrighted property of [employer]  or [employer]am[employer]grath  Bacterial Vaginosis   WHAT YOU NEED TO KNOW:   What is bacterial vaginosis? Bacterial vaginosis (BV) is an infection in the vagina  It may cause vaginitis, which is irritation and inflammation of the vagina  What causes bacterial vaginosis? The cause of BV is not known  With BV, there is an imbalance in bacteria normally found in the vagina  Your risk for BV increases if you are sexually active  Your risk for BV also increases if you douche or have an intrauterine device (IUD)  What are the signs and symptoms of bacterial vaginosis? Some women have no symptoms  You may have the following:  · White, gray, or yellow vaginal discharge    · Vaginal discharge that smells like fish    · Itching or burning around the outside of your vagina  How is bacterial vaginosis diagnosed? Your healthcare provider will examine you and ask if you have other health conditions   He may need to take a sample of fluid from your vagina  This will be tested for the bacteria that causes BV  How is bacterial vaginosis treated? Antibiotics are given to kill the bacteria that cause BV  They may be given as a pill or as a cream to put in your vagina  Take or use as directed  What are the risks of bacterial vaginosis? If untreated, BV may spread and lead to serious infections in your uterus and fallopian tubes  This can make it more difficult to get pregnant  BV increases your risk for other sexually transmitted infections (STIs), such as chlamydia, gonorrhea, or HIV  How can I prevent bacterial vaginosis? · Keep your vaginal area clean and dry:  Wear underwear and pantyhose with a cotton crotch  Wipe from front to back after you urinate or have a bowel movement  After bathing, rinse soap from your vaginal area to decrease your risk for irritation  · Do not use products that cause irritation:  Always use unscented tampons or sanitary pads  Do not use feminine sprays, powders, or scented tampons because they may cause irritation and increase your risk of BV  Detergents and fabric softeners may also cause irritation  · Do not douche: This can cause an imbalance in healthy vaginal bacteria  · Use latex condoms: This helps prevent another infection and keeps your partner from getting the infection  When should I contact my healthcare provider? · Your symptoms come back or do not improve with treatment  · You have vaginal bleeding that is not your monthly period  · You have questions or concerns about your condition or care  CARE AGREEMENT:   You have the right to help plan your care  Learn about your health condition and how it may be treated  Discuss treatment options with your caregivers to decide what care you want to receive  You always have the right to refuse treatment  The above information is an  only   It is not intended as medical advice for individual conditions or treatments  Talk to your doctor, nurse or pharmacist before following any medical regimen to see if it is safe and effective for you  © 2017 [address] Information is for End User's use only and may not be sold, redistributed or otherwise used for commercial purposes  All illustrations and images included in CareNotes® are the copyrighted property of [employer]  or [employer]

## 2020-02-18 NOTE — PROGRESS NOTES
Assessment/Plan   Diagnoses and all orders for this visit:    Bacterial vaginosis  -     POCT wet mount  -     tinidazole (TINDAMAX) 500 MG tablet; Take 2 tablets (1,000 mg total) by mouth daily with breakfast for 5 days    Vaginal candidiasis  -     POCT wet mount  -     fluconazole (DIFLUCAN) 150 mg tablet; Take 1 tablet (150 mg total) by mouth once for 1 dose Repeat in 1 week    IUD contraception    Secondary oligomenorrhea    Endometriosis     1  BV with yeast-noted again  Patient did note some resolution after Metrogel vaginal and Diflucan, but it seemed to recur in a couple days  Exam and wet prep confirm this  Treatment options were reviewed  We will try Tinidazole 1 g daily for 5 days and prescription was sent along with fluconazole 150 mg p o  X1 with repeat in 1 week time  She will call or return with any issues  2  General-Pap smear done  We will inform the patient of the findings  3  Mirena IUD-placed 10/30/18  IUD string is about 2-2 5 cm in length  She is doing well on this  4  Secondary oligomenorrhea-rare light bleeding noted  She will call with any issues  5  History of pelvic pain/endometriosis/irregular bleeding/migraine history-no current concerns  6  Other-patient had previous IUD which fell out  She is doing well  7  STD concerns-GC/chlamydia were negative from last visit  Patient has a new partner for the past 4 months or so since October 2019  She did not get HIV, RPR, and hepatitis panel which were ordered last visit  She plans to get this done in the near future  Otherwise, follow-up 1 year for yearly exam or as needed  Subjective   Patient ID: Catarino Santo is a 34 y o  female  Vitals:    02/18/20 1550   BP: 130/72     Patient is seen today for follow-up visit  Please see assessment plan for details        The following portions of the patient's history were reviewed and updated as appropriate: allergies, current medications, past family history, past medical history, past social history, past surgical history and problem list   Past Medical History:   Diagnosis Date    Abdominal pain     Anxiety     Asthma     Bipolar disorder ([address] )     Chronic pain disorder     right hip    Depression     Diarrhea     Endometrial disorder 2012    Endometriosis     Epigastric pain     Fatigue     Morbid obesity (HCC)     Nausea and vomiting      Past Surgical History:   Procedure Laterality Date    DIAGNOSTIC LAPAROSCOPY      DIAGNOSTIC LAPAROSCOPY      DILATION AND CURETTAGE OF UTERUS      NV COLONOSCOPY FLX DX W/COLLJ SPEC WHEN PFRMD N/A 2017    Procedure: EGD with bx AND COLONOSCOPY with bx;  Surgeon: Ximena Baig MD;  Location: [facility]; Service: Gastroenterology     OB History    Para Term  AB Living   5 3 3 0 2 3   SAB TAB Ectopic Multiple Live Births   0 2 0 0 3      # Outcome Date GA Lbr Gregor/2nd Weight Sex Delivery Anes PTL Lv   5 TAB 18           4 Term 16 40w2d 08:55 / 00:07 3590 g (7 lb 14 6 oz) F Vag-Spont None  SRINI   3 Term 14    F Vag-Spont None  SRINI   2 Term 01/20/10    M Vag-Spont None  SRINI      Complications:  Other Excessive Bleeding   1 TAB                Current Outpatient Medications:     metroNIDAZOLE (METROGEL) 0 75 % vaginal gel, Insert 1 application into the vagina daily For 5 nights (Patient not taking: Reported on 2020), Disp: 70 g, Rfl: 0    ondansetron (ZOFRAN) 4 mg tablet, Take 1 tablet (4 mg total) by mouth every 6 (six) hours (Patient not taking: Reported on 2020), Disp: 12 tablet, Rfl: 0  Allergies   Allergen Reactions    Shellfish-Derived Products Anaphylaxis    Other      Adhesive tape , fruits    Shellfish Allergy      Social History     Socioeconomic History    Marital status: Single     Spouse name: None    Number of children: None    Years of education: None    Highest education level: None   Occupational History    None   Social Needs    Financial resource strain: None    Food insecurity:     Worry: None     Inability: None    Transportation needs:     Medical: None     Non-medical: None   Tobacco Use    Smoking status: Current Every Day Smoker    Smokeless tobacco: Never Used    Tobacco comment: 3 cigarettes a day   Substance and Sexual Activity    Alcohol use: Yes     Comment: Socially    Drug use: No    Sexual activity: Yes     Partners: Male     Birth control/protection: Inserts   Lifestyle    Physical activity:     Days per week: None     Minutes per session: None    Stress: None   Relationships    Social connections:     Talks on phone: None     Gets together: None     Attends Worship service: None     Active member of club or organization: None     Attends meetings of clubs or organizations: None     Relationship status: None    Intimate partner violence:     Fear of current or ex partner: None     Emotionally abused: None     Physically abused: None     Forced sexual activity: None   Other Topics Concern    None   Social History Narrative    None     Family History   Problem Relation Age of Onset    No Known Problems Mother     No Known Problems Father        Review of Systems   Constitutional: Negative for chills, diaphoresis, fatigue and fever  Respiratory: Negative for apnea, cough, chest tightness, shortness of breath and wheezing  Cardiovascular: Negative for chest pain, palpitations and leg swelling  Gastrointestinal: Negative for abdominal distention, abdominal pain, anal bleeding, constipation, diarrhea, nausea, rectal pain and vomiting  Genitourinary: Negative for difficulty urinating, dyspareunia, dysuria, frequency, hematuria, menstrual problem, pelvic pain, urgency, vaginal bleeding, vaginal discharge and vaginal pain  Musculoskeletal: Negative for arthralgias, back pain and myalgias  Skin: Negative for color change and rash  Neurological: Negative for dizziness, syncope, light-headedness, numbness and headaches  Hematological: Negative for adenopathy  Does not bruise/bleed easily  Psychiatric/Behavioral: Negative for dysphoric mood and sleep disturbance  The patient is not nervous/anxious  Objective   Physical Exam    Objective      /72 (BP Location: Left arm, Patient Position: Sitting, Cuff Size: Standard)   Ht 5' 5" (1 651 m)   Wt 107 kg (235 lb)   BMI 39 11 kg/m²     General:   alert and oriented, in no acute distress   Neck: normal to inspection and palpation   Breast: normal appearance, no masses or tenderness   Heart:    Lungs:    Abdomen: soft, non-tender, without masses or organomegaly   Vulva: normal   Vagina: Moderate amount of white discharge, without erythema or lesions   Cervix: Small amount of white discharge, without lesions or cervicitis  No Cervical motion tenderness    IUD string seen at a length of 2-2 5 cm   Uterus: top normal size, anteverted, non-tender   Adnexa: no mass, fullness, tenderness   Rectum: Deferred, patient declined    Psych:  Normal mood and affect   Skin:  Without obvious lesions   Eyes: symmetric, with normal movements and reactivity   Musculoskeletal:  Normal muscle tone and movements appreciated

## 2020-02-20 LAB
LAB AP GYN PRIMARY INTERPRETATION: NORMAL
Lab: NORMAL

## 2020-02-24 ENCOUNTER — TELEPHONE (OUTPATIENT)
Dept: OBGYN CLINIC | Facility: CLINIC | Age: 30
End: 2020-02-24

## 2020-02-25 DIAGNOSIS — N76.0 BACTERIAL VAGINOSIS: Primary | ICD-10-CM

## 2020-02-25 DIAGNOSIS — B96.89 BACTERIAL VAGINOSIS: Primary | ICD-10-CM

## 2020-02-25 RX ORDER — METRONIDAZOLE 7.5 MG/G
1 GEL VAGINAL
Qty: 70 G | Refills: 1 | Status: SHIPPED | OUTPATIENT
Start: 2020-02-25 | End: 2020-03-06

## 2020-02-25 NOTE — TELEPHONE ENCOUNTER
Either Metrogel vaginal or metronidazole  Would consider extended treatment as she has recurrence of BV frequently  Could consider either 10 days of Metrogel vaginal or 10-14 days of oral metronidazole  Please discuss with the patient and let me know    Thanks

## 2020-05-21 ENCOUNTER — TELEPHONE (OUTPATIENT)
Dept: OBGYN CLINIC | Facility: CLINIC | Age: 30
End: 2020-05-21

## 2020-05-21 ENCOUNTER — APPOINTMENT (OUTPATIENT)
Dept: LAB | Facility: HOSPITAL | Age: 30
End: 2020-05-21
Attending: OBSTETRICS & GYNECOLOGY
Payer: COMMERCIAL

## 2020-05-21 DIAGNOSIS — Z11.3 SCREENING FOR STD (SEXUALLY TRANSMITTED DISEASE): ICD-10-CM

## 2020-05-21 PROCEDURE — 86592 SYPHILIS TEST NON-TREP QUAL: CPT

## 2020-05-21 PROCEDURE — 36415 COLL VENOUS BLD VENIPUNCTURE: CPT

## 2020-05-21 PROCEDURE — 87389 HIV-1 AG W/HIV-1&-2 AB AG IA: CPT

## 2020-05-21 PROCEDURE — 80074 ACUTE HEPATITIS PANEL: CPT

## 2020-05-22 ENCOUNTER — OFFICE VISIT (OUTPATIENT)
Dept: OBGYN CLINIC | Facility: CLINIC | Age: 30
End: 2020-05-22
Payer: COMMERCIAL

## 2020-05-22 VITALS
WEIGHT: 228 LBS | SYSTOLIC BLOOD PRESSURE: 118 MMHG | BODY MASS INDEX: 37.99 KG/M2 | DIASTOLIC BLOOD PRESSURE: 78 MMHG | HEIGHT: 65 IN

## 2020-05-22 DIAGNOSIS — B37.9 YEAST INFECTION: ICD-10-CM

## 2020-05-22 DIAGNOSIS — Z11.3 SCREENING FOR STDS (SEXUALLY TRANSMITTED DISEASES): Primary | ICD-10-CM

## 2020-05-22 DIAGNOSIS — N76.0 BACTERIAL VAGINOSIS: ICD-10-CM

## 2020-05-22 DIAGNOSIS — B96.89 BACTERIAL VAGINOSIS: ICD-10-CM

## 2020-05-22 LAB
BV WHIFF TEST VAG QL: POSITIVE
CLUE CELLS SPEC QL WET PREP: POSITIVE
HAV IGM SER QL: NORMAL
HBV CORE IGM SER QL: NORMAL
HBV SURFACE AG SER QL: NORMAL
HCV AB SER QL: NORMAL
HIV 1+2 AB+HIV1 P24 AG SERPL QL IA: NORMAL
PH SMN: ABNORMAL [PH]
RPR SER QL: NORMAL
SL AMB POCT WET MOUNT: YES
T VAGINALIS VAG QL WET PREP: NEGATIVE
YEAST VAG QL WET PREP: ABNORMAL

## 2020-05-22 PROCEDURE — 87491 CHLMYD TRACH DNA AMP PROBE: CPT | Performed by: OBSTETRICS & GYNECOLOGY

## 2020-05-22 PROCEDURE — 87210 SMEAR WET MOUNT SALINE/INK: CPT | Performed by: OBSTETRICS & GYNECOLOGY

## 2020-05-22 PROCEDURE — 87591 N.GONORRHOEAE DNA AMP PROB: CPT | Performed by: OBSTETRICS & GYNECOLOGY

## 2020-05-22 PROCEDURE — 99214 OFFICE O/P EST MOD 30 MIN: CPT | Performed by: OBSTETRICS & GYNECOLOGY

## 2020-05-22 RX ORDER — FLUCONAZOLE 150 MG/1
150 TABLET ORAL ONCE
Qty: 1 TABLET | Refills: 0 | Status: SHIPPED | OUTPATIENT
Start: 2020-05-22 | End: 2020-05-22

## 2020-05-22 RX ORDER — METRONIDAZOLE 7.5 MG/G
1 GEL VAGINAL DAILY
Qty: 140 G | Refills: 2 | Status: SHIPPED | OUTPATIENT
Start: 2020-05-22 | End: 2021-05-11 | Stop reason: SDUPTHER

## 2020-05-23 LAB
C TRACH DNA SPEC QL NAA+PROBE: NEGATIVE
N GONORRHOEA DNA SPEC QL NAA+PROBE: NEGATIVE

## 2021-01-18 ENCOUNTER — OFFICE VISIT (OUTPATIENT)
Dept: URGENT CARE | Facility: MEDICAL CENTER | Age: 31
End: 2021-01-18
Payer: COMMERCIAL

## 2021-01-18 VITALS
WEIGHT: 228 LBS | OXYGEN SATURATION: 100 % | HEART RATE: 62 BPM | HEIGHT: 65 IN | BODY MASS INDEX: 37.99 KG/M2 | RESPIRATION RATE: 18 BRPM | TEMPERATURE: 97.9 F

## 2021-01-18 DIAGNOSIS — J02.9 PHARYNGITIS, UNSPECIFIED ETIOLOGY: Primary | ICD-10-CM

## 2021-01-18 DIAGNOSIS — Z20.822 ENCOUNTER FOR LABORATORY TESTING FOR COVID-19 VIRUS: ICD-10-CM

## 2021-01-18 PROCEDURE — 87637 SARSCOV2&INF A&B&RSV AMP PRB: CPT | Performed by: PHYSICIAN ASSISTANT

## 2021-01-18 PROCEDURE — 99213 OFFICE O/P EST LOW 20 MIN: CPT | Performed by: PHYSICIAN ASSISTANT

## 2021-01-18 RX ORDER — AMOXICILLIN 500 MG/1
500 CAPSULE ORAL EVERY 8 HOURS SCHEDULED
Qty: 30 CAPSULE | Refills: 0 | Status: SHIPPED | OUTPATIENT
Start: 2021-01-18 | End: 2021-01-28

## 2021-01-18 RX ORDER — FLUCONAZOLE 150 MG/1
150 TABLET ORAL ONCE
Qty: 1 TABLET | Refills: 0 | Status: SHIPPED | OUTPATIENT
Start: 2021-01-18 | End: 2021-01-18

## 2021-01-18 NOTE — PROGRESS NOTES
[facility] [employer] [facility] [employer] [facility]        NAME: Melissa Banerjee is a 31 y o  female  : 1990    MRN: [id number]  DATE: 2021  TIME: 2:30 PM    Pulse 62   Temp 97 9 °F (36 6 °C) (Tympanic)   Resp 18   Ht 5' 5" (1 651 m)   Wt 103 kg (228 lb)   SpO2 100%   BMI 37 94 kg/m²     Assessment and Plan   Pharyngitis, unspecified etiology [J02 9]  1  Pharyngitis, unspecified etiology  Novel Coronavirus 2019(COVID-19), Influenza A/B, RSV [facility]    amoxicillin (AMOXIL) 500 mg capsule    fluconazole (DIFLUCAN) 150 mg tablet   2  Encounter for laboratory testing for COVID-19 virus  amoxicillin (AMOXIL) 500 mg capsule    fluconazole (DIFLUCAN) 150 mg tablet         Patient Instructions       Follow up with PCP in 3-5 days  Proceed to  ER if symptoms worsen  Chief Complaint     Chief Complaint   Patient presents with    COVID-19     Pt c/o sore throat for the past 1 5 weeks  Denies fever  Denies exposure to COVID  History of Present Illness       Pt with sore throat and history of strep pharyngitis       Review of Systems   Review of Systems   Constitutional: Negative  HENT: Positive for sore throat  Eyes: Negative  Respiratory: Negative  Cardiovascular: Negative  Gastrointestinal: Negative  Endocrine: Negative  Genitourinary: Negative  Musculoskeletal: Negative  Skin: Negative  Allergic/Immunologic: Negative  Neurological: Negative  Hematological: Negative  Psychiatric/Behavioral: Negative  All other systems reviewed and are negative          Current Medications       Current Outpatient Medications:     amoxicillin (AMOXIL) 500 mg capsule, Take 1 capsule (500 mg total) by mouth every 8 (eight) hours for 10 days, Disp: 30 capsule, Rfl: 0    fluconazole (DIFLUCAN) 150 mg tablet, Take 1 tablet (150 mg total) by mouth once for 1 dose, Disp: 1 tablet, Rfl: 0    metroNIDAZOLE (METROGEL) 0 75 % vaginal gel, Insert 1 application into the vagina daily For 5 nights  Repeat with 2nd tube  , Disp: 140 g, Rfl: 2    Current Allergies     Allergies as of 01/18/2021 - Reviewed 01/18/2021   Allergen Reaction Noted    Shellfish-derived products Anaphylaxis 02/09/2016    Other Swelling 10/12/2016    Shellfish allergy              The following portions of the patient's history were reviewed and updated as appropriate: allergies, current medications, past family history, past medical history, past social history, past surgical history and problem list      Past Medical History:   Diagnosis Date    Abdominal pain     Anxiety     Asthma     B[facility] disorder ([facility] [address] )     Chronic pain disorder     right hip    Depression     Diarrhea     Endometrial disorder 12/2012    Endometriosis     Epigastric pain     Fatigue     Morbid obesity ([facility] [address] )     Nausea and vomiting        Past Surgical History:   Procedure Laterality Date    DIAGNOSTIC LAPAROSCOPY      DIAGNOSTIC LAPAROSCOPY      DILATION AND CURETTAGE OF UTERUS      CA COLONOSCOPY FLX DX W/COLLJ SPEC WHEN PFRMD N/A 8/4/2017    Procedure: EGD with bx AND COLONOSCOPY with bx;  Surgeon: Sarah Lopez MD;  Location: [facility]; Service: Gastroenterology       Family History   Problem Relation Age of Onset    No Known Problems Mother     No Known Problems Father          Medications have been verified  Objective   Pulse 62   Temp 97 9 °F (36 6 °C) (Tympanic)   Resp 18   Ht 5' 5" (1 651 m)   Wt 103 kg (228 lb)   SpO2 100%   BMI 37 94 kg/m²        Physical Exam     Physical Exam  Vitals signs and nursing note reviewed  Constitutional:       Appearance: Normal appearance  She is normal weight  HENT:      Head: Normocephalic and atraumatic        Right Ear: Tympanic membrane, ear canal and external ear normal       Left Ear: Tympanic membrane, ear canal and external ear normal       Nose: Nose normal       Mouth/Throat:      Mouth: Mucous membranes are moist       Pharynx: Oropharyngeal exudate and posterior oropharyngeal erythema present  Eyes:      Extraocular Movements: Extraocular movements intact  Conjunctiva/sclera: Conjunctivae normal       Pupils: Pupils are equal, round, and reactive to light  Neck:      Musculoskeletal: Normal range of motion  Cardiovascular:      Rate and Rhythm: Normal rate and regular rhythm  Pulses: Normal pulses  Heart sounds: Normal heart sounds  Pulmonary:      Effort: Pulmonary effort is normal       Breath sounds: Normal breath sounds  Abdominal:      General: Abdomen is flat  Bowel sounds are normal       Palpations: Abdomen is soft  Musculoskeletal: Normal range of motion  Lymphadenopathy:      Cervical: Cervical adenopathy present  Skin:     General: Skin is warm  Capillary Refill: Capillary refill takes less than 2 seconds  Neurological:      General: No focal deficit present  Mental Status: She is oriented to person, place, and time     Psychiatric:         Mood and Affect: Mood normal

## 2021-01-18 NOTE — PATIENT INSTRUCTIONS
[address]    Your healthcare provider and/or public health staff have evaluated you and have determined that you do not need to remain in the hospital at this time  At this time you can be isolated at home where you will be monitored by staff from your local or state health department  You should carefully follow the prevention and isolation steps below until a healthcare provider or local or state health department says that you can return to your normal activities  Stay home except to get medical care    People who are mildly ill with COVID-19 are able to isolate at home during their illness  You should restrict activities outside your home, except for getting medical care  Do not go to work, school, or public areas  Avoid using public transportation, ride-sharing, or taxis  Separate yourself from other people and animals in your home    People: As much as possible, you should stay in a specific room and away from other people in your home  Also, you should use a separate bathroom, if available  Animals: You should restrict contact with pets and other animals while you are sick with COVID-19, just like you would around other people  Although there have not been reports of pets or other animals becoming sick with COVID-19, it is still recommended that people sick with COVID-19 limit contact with animals until more information is known about the virus  When possible, have another member of your household care for your animals while you are sick  If you are sick with COVID-19, avoid contact with your pet, including petting, snuggling, being kissed or licked, and sharing food  If you must care for your pet or be around animals while you are sick, wash your hands before and after you interact with pets and wear a facemask  See COVID-19 and Animals for more information      Call ahead before visiting your doctor    If you have a medical appointment, call the healthcare provider and tell them that you have or may have COVID-19  This will help the healthcare providers office take steps to keep other people from getting infected or exposed  Wear a facemask    You should wear a facemask when you are around other people (e g , sharing a room or vehicle) or pets and before you enter a healthcare providers office  If you are not able to wear a facemask (for example, because it causes trouble breathing), then people who live with you should not stay in the same room with you, or they should wear a facemask if they enter your room  Cover your coughs and sneezes    Cover your mouth and nose with a tissue when you cough or sneeze  Throw used tissues in a lined trash can  Immediately wash your hands with soap and water for at least 20 seconds or, if soap and water are not available, clean your hands with an alcohol-based hand  that contains at least 60% alcohol  Clean your hands often    Wash your hands often with soap and water for at least 20 seconds, especially after blowing your nose, coughing, or sneezing; going to the bathroom; and before eating or preparing food  If soap and water are not readily available, use an alcohol-based hand  with at least 60% alcohol, covering all surfaces of your hands and rubbing them together until they feel dry  Soap and water are the best option if hands are visibly dirty  Avoid touching your eyes, nose, and mouth with unwashed hands  Avoid sharing personal household items    You should not share dishes, drinking glasses, cups, eating utensils, towels, or bedding with other people or pets in your home  After using these items, they should be washed thoroughly with soap and water  Clean all high-touch surfaces everyday    High touch surfaces include counters, tabletops, doorknobs, bathroom fixtures, toilets, phones, keyboards, tablets, and bedside tables  Also, clean any surfaces that may have blood, stool, or body fluids on them   Use a household cleaning spray or wipe, according to the label instructions  Labels contain instructions for safe and effective use of the cleaning product including precautions you should take when applying the product, such as wearing gloves and making sure you have good ventilation during use of the product  Monitor your symptoms    Seek prompt medical attention if your illness is worsening (e g , difficulty breathing)  Before seeking care, call your healthcare provider and tell them that you have, or are being evaluated for, COVID-19  Put on a facemask before you enter the facility  These steps will help the healthcare providers office to keep other people in the office or waiting room from getting infected or exposed  Ask your healthcare provider to call the local or [facility] health department  Persons who are placed under active monitoring or facilitated self-monitoring should follow instructions provided by their local health department or occupational health professionals, as appropriate  If you have a medical emergency and need to call 911, notify the dispatch personnel that you have, or are being evaluated for COVID-19  If possible, put on a facemask before emergency medical services arrive      Discontinuing home isolation    Patients with confirmed COVID-19 should remain under home isolation precautions until the following conditions are met:   - They have had no fever for at least 24 hours (that is one full day of no fever without the use medicine that reduces fevers)  AND  - other symptoms have improved (for example, when their cough or shortness of breath have improved)  AND  - If had mild or moderate illness, at least 10 days have passed since their symptoms first appeared or if severe illness (needed oxygen) or immunosuppressed, at least 20 days have passed since symptoms first appeared  Patients with confirmed COVID-19 should also notify close contacts (including their workplace) and ask that they self-quarantine  Currently, close contact is defined as being within 6 feet for 15 minutes or more from the period 24 hours starting 48 hours before symptom onset to the time at which the patient went into isolation  Close contacts of patients diagnosed with COVID-19 should be instructed by the patient to self-quarantine for 14 days from the last time of their last contact with the patient  Source: [employer]   WHAT YOU NEED TO KNOW:   Pharyngitis, or sore throat, is inflammation of the tissues and structures in your pharynx (throat)  Pharyngitis is most often caused by bacteria  It may also be caused by a cold or flu virus  Other causes include smoking, allergies, or acid reflux  DISCHARGE INSTRUCTIONS:   Call 911 for any of the following:   · You have trouble breathing or swallowing because your throat is swollen or sore  Return to the emergency department if:   · You are drooling because it hurts too much to swallow  · Your fever is higher than 102? F (39?C) or lasts longer than 3 days  · You are confused  · You taste blood in your throat  Contact your healthcare provider if:   · Your throat pain gets worse  · You have a painful lump in your throat that does not go away after 5 days  · Your symptoms do not improve after 5 days  · You have questions or concerns about your condition or care  Medicines:  Viral pharyngitis will go away on its own without treatment  Your sore throat should start to feel better in 3 to 5 days for both viral and bacterial infections  You may need any of the following:  · Antibiotics  treat a bacterial infection  · NSAIDs , such as ibuprofen, help decrease swelling, pain, and fever  NSAIDs can cause stomach bleeding or kidney problems in certain people  If you take blood thinner medicine, always ask your healthcare provider if NSAIDs are safe for you   Always read the medicine label and follow directions  · Acetaminophen  decreases pain and fever  It is available without a doctor's order  Ask how much to take and how often to take it  Follow directions  Acetaminophen can cause liver damage if not taken correctly  · Take your medicine as directed  Contact your healthcare provider if you think your medicine is not helping or if you have side effects  Tell him or her if you are allergic to any medicine  Keep a list of the medicines, vitamins, and herbs you take  Include the amounts, and when and why you take them  Bring the list or the pill bottles to follow-up visits  Carry your medicine list with you in case of an emergency  Manage your symptoms:   · Gargle salt water  Mix ¼ teaspoon salt in an 8 ounce glass of warm water and gargle  This may help decrease swelling in your throat  · Drink liquids as directed  You may need to drink more liquids than usual  Liquids may help soothe your throat and prevent dehydration  Ask how much liquid to drink each day and which liquids are best for you  · Use a cool-steam humidifier  to help moisten the air in your room and calm your cough  · Soothe your throat  with cough drops, ice, soft foods, or popsicles  Prevent the spread of pharyngitis:  Cover your mouth and nose when you cough or sneeze  Do not share food or drinks  Wash your hands often  Use soap and water  If soap and water are unavailable, use an alcohol based hand   Follow up with your healthcare provider as directed:  Write down your questions so you remember to ask them during your visits  © Copyright [address] Information is for End User's use only and may not be sold, redistributed or otherwise used for commercial purposes  All illustrations and images included in CareNotes® are the copyrighted property of [employer]  or [facility]   The above information is an  only   It is not intended as medical advice for individual conditions or treatments  Talk to your doctor, nurse or pharmacist before following any medical regimen to see if it is safe and effective for you

## 2021-01-19 LAB
FLUAV RNA NPH QL NAA+PROBE: NOT DETECTED
FLUBV RNA NPH QL NAA+PROBE: NOT DETECTED
RSV RNA NPH QL NAA+PROBE: NOT DETECTED
SARS-COV-2 RNA NPH QL NAA+PROBE: NOT DETECTED

## 2021-03-09 ENCOUNTER — TELEPHONE (OUTPATIENT)
Dept: OBGYN CLINIC | Facility: CLINIC | Age: 31
End: 2021-03-09

## 2021-03-09 NOTE — TELEPHONE ENCOUNTER
Patient called to report she got an anonymous text message informing her she was exposed to an STD  Patient wants appointment  States no sx

## 2021-03-11 ENCOUNTER — OFFICE VISIT (OUTPATIENT)
Dept: OBGYN CLINIC | Facility: CLINIC | Age: 31
End: 2021-03-11
Payer: COMMERCIAL

## 2021-03-11 VITALS
SYSTOLIC BLOOD PRESSURE: 116 MMHG | HEIGHT: 65 IN | WEIGHT: 232.2 LBS | BODY MASS INDEX: 38.69 KG/M2 | DIASTOLIC BLOOD PRESSURE: 90 MMHG

## 2021-03-11 DIAGNOSIS — A64 STD (FEMALE): Primary | ICD-10-CM

## 2021-03-11 PROCEDURE — 99213 OFFICE O/P EST LOW 20 MIN: CPT | Performed by: OBSTETRICS & GYNECOLOGY

## 2021-03-11 PROCEDURE — 87591 N.GONORRHOEAE DNA AMP PROB: CPT | Performed by: OBSTETRICS & GYNECOLOGY

## 2021-03-11 PROCEDURE — 87491 CHLMYD TRACH DNA AMP PROBE: CPT | Performed by: OBSTETRICS & GYNECOLOGY

## 2021-03-11 NOTE — PROGRESS NOTES
Assessment/Plan:   Possible STD exposure, await results for chlamydia and GC to return  There are no diagnoses linked to this encounter  Subjective:     Patient ID: Ashlyn Montana is a 31 y o  female  HPI:   This patient states that she got a mysterious text indicating to her that she may have been exposed to an STD recently  The patient of fortunately has no knowledge of whom were when but simply wishes not to take any chances and to get tested for chlamydia and gonorrhea  She presently has no symptoms whatsoever  Review of Systems    All negative    Objective:     Physical Exam   Vulvar and vaginal exams are unremarkable  Cervix is grossly normal mobile and nontender and an IUD strings present  At this time a GC chlamydia cultures obtained  The cervix is mobile and nontender  Uterus is midposition mobile and nontender with no adnexal masses or tenderness

## 2021-03-12 LAB
C TRACH DNA SPEC QL NAA+PROBE: NEGATIVE
N GONORRHOEA DNA SPEC QL NAA+PROBE: NEGATIVE

## 2021-04-02 ENCOUNTER — ANNUAL EXAM (OUTPATIENT)
Dept: OBGYN CLINIC | Facility: CLINIC | Age: 31
End: 2021-04-02
Payer: COMMERCIAL

## 2021-04-02 VITALS
HEIGHT: 65 IN | WEIGHT: 227 LBS | DIASTOLIC BLOOD PRESSURE: 80 MMHG | SYSTOLIC BLOOD PRESSURE: 118 MMHG | BODY MASS INDEX: 37.82 KG/M2

## 2021-04-02 DIAGNOSIS — N80.9 ENDOMETRIOSIS: ICD-10-CM

## 2021-04-02 DIAGNOSIS — Z97.5 IUD CONTRACEPTION: Primary | ICD-10-CM

## 2021-04-02 DIAGNOSIS — N91.4 SECONDARY OLIGOMENORRHEA: ICD-10-CM

## 2021-04-02 DIAGNOSIS — Z30.09 BIRTH CONTROL COUNSELING: ICD-10-CM

## 2021-04-02 PROCEDURE — 99214 OFFICE O/P EST MOD 30 MIN: CPT | Performed by: OBSTETRICS & GYNECOLOGY

## 2021-04-02 NOTE — Clinical Note
Patient interested in tubal ligation  Signed consent form and MA 31 form today, 4/2/21    Interested in tubal early May, right after the 30 day interval   Thanks

## 2021-04-23 ENCOUNTER — APPOINTMENT (OUTPATIENT)
Dept: LAB | Facility: HOSPITAL | Age: 31
End: 2021-04-23
Attending: OBSTETRICS & GYNECOLOGY
Payer: COMMERCIAL

## 2021-04-23 DIAGNOSIS — Z01.818 PRE-OP TESTING: ICD-10-CM

## 2021-04-23 LAB
ABO GROUP BLD: NORMAL
ANION GAP SERPL CALCULATED.3IONS-SCNC: 7 MMOL/L (ref 4–13)
BASOPHILS # BLD AUTO: 0.04 THOUSANDS/ΜL (ref 0–0.1)
BASOPHILS NFR BLD AUTO: 1 % (ref 0–1)
BLD GP AB SCN SERPL QL: NEGATIVE
BUN SERPL-MCNC: 12 MG/DL (ref 5–25)
CALCIUM SERPL-MCNC: 8.9 MG/DL (ref 8.3–10.1)
CHLORIDE SERPL-SCNC: 105 MMOL/L (ref 100–108)
CO2 SERPL-SCNC: 30 MMOL/L (ref 21–32)
CREAT SERPL-MCNC: 0.76 MG/DL (ref 0.6–1.3)
EOSINOPHIL # BLD AUTO: 0.16 THOUSAND/ΜL (ref 0–0.61)
EOSINOPHIL NFR BLD AUTO: 2 % (ref 0–6)
ERYTHROCYTE [DISTWIDTH] IN BLOOD BY AUTOMATED COUNT: 12.7 % (ref 11.6–15.1)
GFR SERPL CREATININE-BSD FRML MDRD: 107 ML/MIN/1.73SQ M
GLUCOSE SERPL-MCNC: 91 MG/DL (ref 65–140)
HCT VFR BLD AUTO: 41.1 % (ref 34.8–46.1)
HGB BLD-MCNC: 13.5 G/DL (ref 11.5–15.4)
IMM GRANULOCYTES # BLD AUTO: 0.02 THOUSAND/UL (ref 0–0.2)
IMM GRANULOCYTES NFR BLD AUTO: 0 % (ref 0–2)
LYMPHOCYTES # BLD AUTO: 1.82 THOUSANDS/ΜL (ref 0.6–4.47)
LYMPHOCYTES NFR BLD AUTO: 23 % (ref 14–44)
MCH RBC QN AUTO: 30.3 PG (ref 26.8–34.3)
MCHC RBC AUTO-ENTMCNC: 32.8 G/DL (ref 31.4–37.4)
MCV RBC AUTO: 92 FL (ref 82–98)
MONOCYTES # BLD AUTO: 0.62 THOUSAND/ΜL (ref 0.17–1.22)
MONOCYTES NFR BLD AUTO: 8 % (ref 4–12)
NEUTROPHILS # BLD AUTO: 5.12 THOUSANDS/ΜL (ref 1.85–7.62)
NEUTS SEG NFR BLD AUTO: 66 % (ref 43–75)
NRBC BLD AUTO-RTO: 0 /100 WBCS
PLATELET # BLD AUTO: 221 THOUSANDS/UL (ref 149–390)
PMV BLD AUTO: 10.8 FL (ref 8.9–12.7)
POTASSIUM SERPL-SCNC: 3.8 MMOL/L (ref 3.5–5.3)
RBC # BLD AUTO: 4.46 MILLION/UL (ref 3.81–5.12)
RH BLD: POSITIVE
SODIUM SERPL-SCNC: 142 MMOL/L (ref 136–145)
SPECIMEN EXPIRATION DATE: NORMAL
WBC # BLD AUTO: 7.78 THOUSAND/UL (ref 4.31–10.16)

## 2021-04-23 PROCEDURE — 80048 BASIC METABOLIC PNL TOTAL CA: CPT

## 2021-04-23 PROCEDURE — 86850 RBC ANTIBODY SCREEN: CPT

## 2021-04-23 PROCEDURE — 85025 COMPLETE CBC W/AUTO DIFF WBC: CPT

## 2021-04-23 PROCEDURE — 86900 BLOOD TYPING SEROLOGIC ABO: CPT

## 2021-04-23 PROCEDURE — 36415 COLL VENOUS BLD VENIPUNCTURE: CPT

## 2021-04-23 PROCEDURE — 86901 BLOOD TYPING SEROLOGIC RH(D): CPT

## 2021-04-28 ENCOUNTER — TELEPHONE (OUTPATIENT)
Dept: OBGYN CLINIC | Facility: CLINIC | Age: 31
End: 2021-04-28

## 2021-04-30 NOTE — PRE-PROCEDURE INSTRUCTIONS
Pre-Surgery Instructions:   Medication Instructions    NON FORMULARY Instructed patient per Anesthesia Guidelines  Instructed has no medications to take the morning of surgery  No aspirin, NSAIDs, vitamins, or supplements before surgery starting today

## 2021-05-02 ENCOUNTER — ANESTHESIA EVENT (OUTPATIENT)
Dept: PERIOP | Facility: HOSPITAL | Age: 31
End: 2021-05-02
Payer: COMMERCIAL

## 2021-05-04 ENCOUNTER — HOSPITAL ENCOUNTER (OUTPATIENT)
Facility: HOSPITAL | Age: 31
Setting detail: OUTPATIENT SURGERY
Discharge: HOME | End: 2021-05-04
Attending: OBSTETRICS & GYNECOLOGY | Admitting: OBSTETRICS & GYNECOLOGY
Payer: COMMERCIAL

## 2021-05-04 ENCOUNTER — ANESTHESIA (OUTPATIENT)
Dept: PERIOP | Facility: HOSPITAL | Age: 31
End: 2021-05-04
Payer: COMMERCIAL

## 2021-05-04 VITALS
RESPIRATION RATE: 20 BRPM | BODY MASS INDEX: 37.82 KG/M2 | SYSTOLIC BLOOD PRESSURE: 97 MMHG | TEMPERATURE: 97.5 F | DIASTOLIC BLOOD PRESSURE: 67 MMHG | WEIGHT: 227 LBS | OXYGEN SATURATION: 100 % | HEIGHT: 65 IN | HEART RATE: 50 BPM

## 2021-05-04 DIAGNOSIS — Z30.2 ENCOUNTER FOR STERILIZATION: ICD-10-CM

## 2021-05-04 DIAGNOSIS — Z90.79 STATUS POST BILATERAL SALPINGECTOMY: Primary | ICD-10-CM

## 2021-05-04 DIAGNOSIS — Z98.890 S/P LAPAROSCOPY: Primary | ICD-10-CM

## 2021-05-04 PROBLEM — IMO0002 ENDOMETRIOSIS OF THE CUL-DE-SAC: Status: ACTIVE | Noted: 2021-05-04

## 2021-05-04 PROBLEM — N80.3 ENDOMETRIOSIS OF THE CUL-DE-SAC: Status: ACTIVE | Noted: 2021-05-04

## 2021-05-04 LAB
EXT PREGNANCY TEST URINE: NEGATIVE
EXT. CONTROL: NORMAL

## 2021-05-04 PROCEDURE — 81025 URINE PREGNANCY TEST: CPT | Performed by: ANESTHESIOLOGY

## 2021-05-04 PROCEDURE — 88302 TISSUE EXAM BY PATHOLOGIST: CPT | Performed by: PATHOLOGY

## 2021-05-04 PROCEDURE — 58662 LAPAROSCOPY EXCISE LESIONS: CPT | Performed by: OBSTETRICS & GYNECOLOGY

## 2021-05-04 PROCEDURE — 58661 LAPAROSCOPY REMOVE ADNEXA: CPT | Performed by: OBSTETRICS & GYNECOLOGY

## 2021-05-04 RX ORDER — IBUPROFEN 600 MG/1
600 TABLET ORAL EVERY 6 HOURS PRN
Status: DISCONTINUED | OUTPATIENT
Start: 2021-05-04 | End: 2021-05-04 | Stop reason: HOSPADM

## 2021-05-04 RX ORDER — OXYCODONE HYDROCHLORIDE 5 MG/1
5 TABLET ORAL EVERY 4 HOURS PRN
Status: DISCONTINUED | OUTPATIENT
Start: 2021-05-04 | End: 2021-05-04 | Stop reason: HOSPADM

## 2021-05-04 RX ORDER — SENNOSIDES 8.6 MG
650 CAPSULE ORAL EVERY 8 HOURS PRN
Qty: 30 TABLET | Refills: 0 | Status: SHIPPED | OUTPATIENT
Start: 2021-05-04 | End: 2021-06-24

## 2021-05-04 RX ORDER — PROPOFOL 10 MG/ML
INJECTION, EMULSION INTRAVENOUS AS NEEDED
Status: DISCONTINUED | OUTPATIENT
Start: 2021-05-04 | End: 2021-05-04

## 2021-05-04 RX ORDER — OXYCODONE HYDROCHLORIDE 5 MG/1
5 TABLET ORAL EVERY 6 HOURS PRN
Qty: 10 TABLET | Refills: 0 | Status: SHIPPED | OUTPATIENT
Start: 2021-05-04 | End: 2021-06-24

## 2021-05-04 RX ORDER — LIDOCAINE HYDROCHLORIDE 10 MG/ML
INJECTION, SOLUTION EPIDURAL; INFILTRATION; INTRACAUDAL; PERINEURAL AS NEEDED
Status: DISCONTINUED | OUTPATIENT
Start: 2021-05-04 | End: 2021-05-04

## 2021-05-04 RX ORDER — ONDANSETRON 2 MG/ML
4 INJECTION INTRAMUSCULAR; INTRAVENOUS ONCE AS NEEDED
Status: DISCONTINUED | OUTPATIENT
Start: 2021-05-04 | End: 2021-05-04 | Stop reason: HOSPADM

## 2021-05-04 RX ORDER — ONDANSETRON 2 MG/ML
INJECTION INTRAMUSCULAR; INTRAVENOUS AS NEEDED
Status: DISCONTINUED | OUTPATIENT
Start: 2021-05-04 | End: 2021-05-04

## 2021-05-04 RX ORDER — BUPIVACAINE HYDROCHLORIDE 5 MG/ML
INJECTION, SOLUTION EPIDURAL; INTRACAUDAL AS NEEDED
Status: DISCONTINUED | OUTPATIENT
Start: 2021-05-04 | End: 2021-05-04 | Stop reason: HOSPADM

## 2021-05-04 RX ORDER — NEOSTIGMINE METHYLSULFATE 1 MG/ML
INJECTION INTRAVENOUS AS NEEDED
Status: DISCONTINUED | OUTPATIENT
Start: 2021-05-04 | End: 2021-05-04

## 2021-05-04 RX ORDER — FENTANYL CITRATE/PF 50 MCG/ML
25 SYRINGE (ML) INJECTION
Status: DISCONTINUED | OUTPATIENT
Start: 2021-05-04 | End: 2021-05-04 | Stop reason: HOSPADM

## 2021-05-04 RX ORDER — ROCURONIUM BROMIDE 10 MG/ML
INJECTION, SOLUTION INTRAVENOUS AS NEEDED
Status: DISCONTINUED | OUTPATIENT
Start: 2021-05-04 | End: 2021-05-04

## 2021-05-04 RX ORDER — FENTANYL CITRATE 50 UG/ML
INJECTION, SOLUTION INTRAMUSCULAR; INTRAVENOUS AS NEEDED
Status: DISCONTINUED | OUTPATIENT
Start: 2021-05-04 | End: 2021-05-04

## 2021-05-04 RX ORDER — MIDAZOLAM HYDROCHLORIDE 2 MG/2ML
INJECTION, SOLUTION INTRAMUSCULAR; INTRAVENOUS AS NEEDED
Status: DISCONTINUED | OUTPATIENT
Start: 2021-05-04 | End: 2021-05-04

## 2021-05-04 RX ORDER — SODIUM CHLORIDE 9 MG/ML
125 INJECTION, SOLUTION INTRAVENOUS CONTINUOUS
Status: DISCONTINUED | OUTPATIENT
Start: 2021-05-04 | End: 2021-05-04 | Stop reason: HOSPADM

## 2021-05-04 RX ORDER — DEXAMETHASONE SODIUM PHOSPHATE 10 MG/ML
INJECTION, SOLUTION INTRAMUSCULAR; INTRAVENOUS AS NEEDED
Status: DISCONTINUED | OUTPATIENT
Start: 2021-05-04 | End: 2021-05-04

## 2021-05-04 RX ORDER — ACETAMINOPHEN 325 MG/1
650 TABLET ORAL EVERY 6 HOURS PRN
Status: DISCONTINUED | OUTPATIENT
Start: 2021-05-04 | End: 2021-05-04 | Stop reason: HOSPADM

## 2021-05-04 RX ORDER — GLYCOPYRROLATE 0.2 MG/ML
INJECTION INTRAMUSCULAR; INTRAVENOUS AS NEEDED
Status: DISCONTINUED | OUTPATIENT
Start: 2021-05-04 | End: 2021-05-04

## 2021-05-04 RX ORDER — IBUPROFEN 600 MG/1
600 TABLET ORAL EVERY 6 HOURS PRN
Qty: 30 TABLET | Refills: 0 | Status: SHIPPED | OUTPATIENT
Start: 2021-05-04 | End: 2021-06-24

## 2021-05-04 RX ADMIN — SODIUM CHLORIDE: 0.9 INJECTION, SOLUTION INTRAVENOUS at 10:49

## 2021-05-04 RX ADMIN — DEXAMETHASONE SODIUM PHOSPHATE 8 MG: 10 INJECTION, SOLUTION INTRAMUSCULAR; INTRAVENOUS at 10:08

## 2021-05-04 RX ADMIN — ONDANSETRON 8 MG: 2 INJECTION INTRAMUSCULAR; INTRAVENOUS at 10:08

## 2021-05-04 RX ADMIN — GLYCOPYRROLATE 0.4 MG: 0.2 INJECTION, SOLUTION INTRAMUSCULAR; INTRAVENOUS at 10:54

## 2021-05-04 RX ADMIN — FENTANYL CITRATE 25 MCG: 50 INJECTION, SOLUTION INTRAMUSCULAR; INTRAVENOUS at 11:25

## 2021-05-04 RX ADMIN — FENTANYL CITRATE 100 MCG: 50 INJECTION INTRAMUSCULAR; INTRAVENOUS at 09:47

## 2021-05-04 RX ADMIN — PROPOFOL 200 MG: 10 INJECTION, EMULSION INTRAVENOUS at 09:47

## 2021-05-04 RX ADMIN — SODIUM CHLORIDE 125 ML/HR: 0.9 INJECTION, SOLUTION INTRAVENOUS at 08:41

## 2021-05-04 RX ADMIN — IBUPROFEN 600 MG: 600 TABLET, FILM COATED ORAL at 12:37

## 2021-05-04 RX ADMIN — LIDOCAINE HYDROCHLORIDE 60 MG: 10 INJECTION, SOLUTION EPIDURAL; INFILTRATION; INTRACAUDAL; PERINEURAL at 09:47

## 2021-05-04 RX ADMIN — FENTANYL CITRATE 25 MCG: 50 INJECTION, SOLUTION INTRAMUSCULAR; INTRAVENOUS at 11:36

## 2021-05-04 RX ADMIN — NEOSTIGMINE METHYLSULFATE 3 MG: 1 INJECTION INTRAVENOUS at 10:54

## 2021-05-04 RX ADMIN — FENTANYL CITRATE 25 MCG: 50 INJECTION, SOLUTION INTRAMUSCULAR; INTRAVENOUS at 11:42

## 2021-05-04 RX ADMIN — ROCURONIUM BROMIDE 50 MG: 10 INJECTION, SOLUTION INTRAVENOUS at 09:47

## 2021-05-04 RX ADMIN — MIDAZOLAM 2 MG: 1 INJECTION INTRAMUSCULAR; INTRAVENOUS at 09:38

## 2021-05-04 NOTE — OP NOTE
OPERATIVE REPORT  PATIENT NAME: Jodee Epley    :  1990  MRN: [id number]  Pt Location: [facility] OR ROOM 04    SURGERY DATE: 2021    Surgeon(s) and Role:     * Kimberly Arce MD - Primary     * Geetha Ring MD - Assisting    Preop Diagnosis:  Encounter for sterilization [Z30 2]    Post-Op Diagnosis Codes:  Encounter for sterilization [Z30 2]  Fulgaration of posterior cul-de-sac endometriosis    Procedure(s) (LRB):  LAP SALPINGECTOMY;  fulgeration of endometriosis (Bilateral)    Specimen(s):  ID Type Source Tests Collected by Time Destination   1 : Bilateral fallopian tubes Tissue Fallopian Tubes, Bilateral TISSUE EXAM Kimberly Arce MD 2021 1020        Estimated Blood Loss:   20 mL    Drains:  None    Anesthesia Type:   General    Operative Indications:  Encounter for sterilization [Z30 2]  Endometriosis    Operative Findings:  1  Grossly normal external female genitalia  2  IUD strings palpated at external cervical os on bimanual exam  3  2 small endometriotic implants of posterior cul-de-sac  4  Left pelvic sidewall endometriotic implants  5  Grossly normal fallopian tubes and ovaries bilaterally    Complications:   None apparent    Procedure and Technique:  Patient was taken to the operating room were a time out was performed to confirm correct patient and correct procedure  General endotracheal anesthesia (GET) was administered and the patient was positioned on the OR table in the dorsal lithotomy position  All pressure points were padded and a nguyễn hugger was placed to maintain control of core body temperature  A bimanual exam was performed and the uterus was noted to be anteverted, normal in size and consistency with no palpable adnexal masses or fullness  The patient was prepped and draped in the usual sterile fashion with chloroprep on the abdomen and chlorhexidine prep on the vagina and perineum      Operative Technique  A straight catheter was introduced into the bladder, which was drained of 200cc of clear yellow urine  Bimanual exam was performed and confirmed IUD strings protruding through the external cervical os  A sponge stick was then placed inside the vagina to aid in uterine manipulation  Sterile gloves were then exchanged and attention was turned to the abdomen  A 5mm horizontal incision was made at the inferior edge of the umbilicus for introduction of a 5mm trocar  The Varess needle was inserted and pneumoperitoneum was established to maximum of 15mmHg  Trocar was introduced under direct visualization  The entire abdomen and pelvis was inspected and there was no evidence of injury to bowel, bladder, vasculature, or other structures  Attention was then turned to the pelvis  Patient was placed in Trendelenburg and the uterus was elevated to visualize the fallopian tubes  There was noted to be grossly normal tubes and ovaries bilaterally  Two additional port sites were selected in the left and right lower abdomen approximately 2cm superior and medial to the iliac crests  A 5mm incision was made for introduction of a 5mm trocar under direct visualization at each site  A blunt probe was inserted through the right lower abdomen trocar site and used to visualize the fimbriated ends of the tubes  The right fallopian tube was grasped at its fimbriated end with a blunt grasper and elevated to visualize the mesosalpinx  The Klepinger and scissors were used to cauterize and cut the fallopian tubes along the mesosalpinx  Approximately 2cm from the cornua, the Laban Baston was used to cauterize across the width of the fallopian tube  The laparoscopic scissors were then used to amputate the ligated portion of the fallopian tube  This was then withdrawn from the abdominal cavity and sent for pathology  Attention was then turned to the contralateral tube, which was amputated in similar fashion  Good hemostasis was confirmed following irrigation      Attention was then turned to the posterior cul-de-sac where 2 small endometriotic implants were noted  These were fulgurated with the Klepinger device  Given the close proximity of the left pelvic sidewall endometriotic implants to the ureters, this area was avoided and not fulgurated  Following salpingectomy, pneumoperitoneum was allowed to escape  Adequate hemostasis was visualized  The inferior trocars were removed under direct visualization  The laparoscope was withdrawn from the abdomen, followed by its trocar sleeve at the umbilicus  The skin incisions were then closed with 4-0 monocryl  Attention was turned to the vagina  The sponge stick was then removed from the vagina and bimanual exam was again performed and confirmed the presence of the IUD strings  At the conclusion of the procedure, all needle, sponge, and instrument counts were noted to be correct x2  Patient tolerated the procedure well and was transferred to PACU in stable condition prior to discharge with follow up in 1-2 weeks  Dr Tomy Le was present and participated in all key portions of the case       Patient Disposition:  PACU     SIGNATURE: Yudy Chairez MD  DATE: May 4, 2021  TIME: 11:21 AM

## 2021-05-04 NOTE — H&P
Assessment/Plan   32-year-old with desired sterilization for laparoscopy with bilateral salpingectomy, possible tubal fulguration/ligation    Subjective   Patient ID: Santos Payne is a 31 y o  female  Vitals:    05/04/21 0823   BP: 109/62   Pulse: 57   Resp: 16   Temp: 98 °F (36 7 °C)   SpO2: 100%     HPI   [address] is a 32-year-old followed at [facility] for many years  She has been using Mirena IUD for contraception, placed 10/30/18  It is in good position on recent exam with secondary oligomenorrhea  Patient is interested in definitive sterilization, and she was consented for laparoscopy with bilateral salpingectomy, possible tubal fulguration/ligation at 4/2/21 visit  MA 31 form was signed at that time  Patient was counseled about other contraceptive options and current using Mirena IUD as noted above  She wishes to proceed with permanent sterilization, aware that this can only be reversed with IVF or major surgery  She will proceed with planned surgery  We will leave the Mirena IUD in due to beneficial effects on menstrual cycles      The following portions of the patient's history were reviewed and updated as appropriate: allergies, current medications, past family history, past medical history, past social history, past surgical history and problem list   Past Medical History:   Diagnosis Date    Abdominal pain     Anxiety     Asthma     as young child    Bipolar disorder ([facility] [address] [facility]     Chronic pain disorder     right hip    Depression     Diarrhea     Endometrial disorder 12/2012    Endometriosis     Epigastric pain     Fatigue     Morbid obesity ([facility] [address] [facility]     Nausea and vomiting      Past Surgical History:   Procedure Laterality Date    DIAGNOSTIC LAPAROSCOPY      DIAGNOSTIC LAPAROSCOPY      DILATION AND CURETTAGE OF UTERUS      NE COLONOSCOPY FLX DX W/COLLJ SPEC WHEN PFRMD N/A 8/4/2017    Procedure: EGD with bx AND COLONOSCOPY with bx;  Surgeon: Tavo Gandhi MD;  Location: [facility]; Service: Gastroenterology     OB History    Para Term  AB Living   5 3 3 0 2 3   SAB TAB Ectopic Multiple Live Births   0 2 0 0 3      # Outcome Date GA Lbr Gregor/2nd Weight Sex Delivery Anes PTL Lv   5 TAB 18           4 Term 16 40w2d 08:55 / 00:07 3590 g (7 lb 14 6 oz) F Vag-Spont None  SRINI   3 Term 14    F Vag-Spont None  SRINI   2 Term 01/20/10    M Vag-Spont None  SRINI      Complications:  Other Excessive Bleeding   1 TAB                Current Facility-Administered Medications:     sodium chloride 0 9 % infusion, 125 mL/hr, Intravenous, Continuous, Wash Henle, DO, Last Rate: 125 mL/hr at 21 0841, 125 mL/hr at 21 0841  Allergies   Allergen Reactions    Shellfish-Derived Products - Food Allergy Anaphylaxis    Adhesive [Medical Tape] Swelling    Chlorhexidine Itching     burning    Other Swelling     All fruits     Social History     Socioeconomic History    Marital status: Single     Spouse name: None    Number of children: None    Years of education: None    Highest education level: None   Occupational History    None   Social Needs    Financial resource strain: None    Food insecurity     Worry: None     Inability: None    Transportation needs     Medical: None     Non-medical: None   Tobacco Use    Smoking status: Current Some Day Smoker     Packs/day: 0 25     Types: Cigarettes    Smokeless tobacco: Never Used    Tobacco comment: 1/2 pack/ week   Substance and Sexual Activity    Alcohol use: Yes     Frequency: 4 or more times a week     Drinks per session: 1 or 2     Comment: 2-3 glass/day wine or liquor    Drug use: Yes     Types: Marijuana     Comment: smokes daily    Sexual activity: Yes     Partners: Male     Birth control/protection: Inserts   Lifestyle    Physical activity     Days per week: None     Minutes per session: None    Stress: None   Relationships    Social connections     Talks on phone: None     Gets together: None     Attends Protestant service: None     Active member of club or organization: None     Attends meetings of clubs or organizations: None     Relationship status: None    Intimate partner violence     Fear of current or ex partner: None     Emotionally abused: None     Physically abused: None     Forced sexual activity: None   Other Topics Concern    None   Social History Narrative    None     Family History   Problem Relation Age of Onset    No Known Problems Mother     No Known Problems Father        Review of Systems    Objective   Physical Exam    Objective      /62   Pulse 57   Temp 98 °F (36 7 °C) (Temporal)   Resp 16   Ht 5' 5" (1 651 m)   Wt 103 kg (227 lb)   LMP 03/31/2021 Comment: irregular  SpO2 100%   BMI 37 77 kg/m²     General:   alert and oriented, in no acute distress   Neck:    Breast:    Heart: regular rate and rhythm, S1, S2 normal, no murmur, click, rub or gallop   Lungs: clear to auscultation bilaterally   Abdomen: soft, non-tender, without masses or organomegaly   Vulva: normal   Vagina: Without erythema or lesions or discharge  Normal   Cervix: Without lesions or discharge or cervicitis    No Cervical motion tenderness   Uterus: top normal size, anteverted, non-tender   Adnexa: no mass, fullness, tenderness   Rectum: Deferred, patient declined    Psych:  Normal mood and affect   Skin:  Without obvious lesions   Eyes: symmetric, with normal movements and reactivity   Musculoskeletal:  Normal muscle tone and movements appreciated

## 2021-05-04 NOTE — PROGRESS NOTES
Patient status post laparoscopy  Prescription for oxycodone 5 mg tablets, 10  Sent to [facility] [employer] as requested  Encouraged to take Tylenol and ibuprofen alternating for pain, use oxycodone only sparingly as needed for significant pain

## 2021-05-04 NOTE — ANESTHESIA PREPROCEDURE EVALUATION
Procedure:  LAP SALPINGECTOMY;  poss tubal fulg, poss fulg/ removal of endometriosis (Bilateral Uterus)    Relevant Problems   Other   (+) Abdominal pain   (+) Endometriosis   (+) Female pelvic pain        Physical Exam    Airway    Mallampati score: II  TM Distance: >3 FB  Neck ROM: full     Dental   No notable dental hx     Cardiovascular  Rhythm: regular, Rate: normal, Cardiovascular exam normal    Pulmonary  Pulmonary exam normal Breath sounds clear to auscultation,     Other Findings        Anesthesia Plan  ASA Score- 2     Anesthesia Type- general with ASA Monitors  Additional Monitors:   Airway Plan: ETT  Plan Factors-    Chart reviewed  Existing labs reviewed  Patient summary reviewed  Patient is a current smoker  Patient instructed to abstain from smoking on day of procedure  Induction- intravenous  Postoperative Plan-     Informed Consent- Anesthetic plan and risks discussed with patient

## 2021-05-04 NOTE — DISCHARGE INSTRUCTIONS
Salpingectomy   WHAT YOU NEED TO KNOW:   A salpingectomy is surgery to remove one or both of your fallopian tubes  The fallopian tubes carry eggs from the ovaries to the uterus  They are part of a woman's reproductive system  A salpingectomy may be done to treat an ectopic pregnancy, cancer, endometriosis, or an infection  It may also be done to prevent pregnancy or some types of cancer  DISCHARGE INSTRUCTIONS:   Call 911 for any of the following:   · You feel lightheaded, short of breath, and have chest pain  · You cough up blood  · You have trouble breathing  Seek care immediately if:   · Your arm or leg feels warm, tender, and painful  It may look swollen and red  · Blood soaks through your bandage  · Your stitches come apart  · You soak through 1 sanitary pad in 1 hour  · You have trouble urinating or cannot urinate at all  Contact your healthcare provider if:   · You have a fever or chills  · Your wound is red, swollen, or draining pus  · You have pus or a foul-smelling odor coming from your vagina  · Your pain does not get better after you take your medicine  · You have nausea or are vomiting  · Your skin is itchy, swollen, or you have a rash  · You have questions or concerns about your condition or care  Medicines: You may need any of the following:  · Prescription pain medicine  may be given  Ask your healthcare provider how to take this medicine safely  · NSAIDs , such as ibuprofen, help decrease swelling, pain, and fever  NSAIDs can cause stomach bleeding or kidney problems in certain people  If you take blood thinner medicine, always ask your healthcare provider if NSAIDs are safe for you  Always read the medicine label and follow directions  · Take your medicine as directed  Contact your healthcare provider if you think your medicine is not helping or if you have side effects  Tell him or her if you are allergic to any medicine   Keep a list of the medicines, vitamins, and herbs you take  Include the amounts, and when and why you take them  Bring the list or the pill bottles to follow-up visits  Carry your medicine list with you in case of an emergency  Care for your wound as directed:  Ask your healthcare provider when your wound can get wet  Do not take a bath until your healthcare provider says it is okay  Take a shower only  Carefully wash around the wound with soap and water  Let the soap and water gently run over your incision  Do not  scrub your incision  Dry the area and put on new, clean bandages as directed  Change your bandages when they get wet or dirty  If you have strips of medical tape, let them fall off on their own  Activity:  Ask your healthcare provider when you can return to your normal activities  Do not douche, use tampons, or have sex until your healthcare provider says it is okay  These activities may cause infection  Do not exercise or lift anything heavy until your healthcare provider says it is okay  This may put too much stress on your incision  Follow up with your healthcare provider as directed:  Write down your questions so you remember to ask them during your visits  © Copyright [address] [place] [address] Drive Information is for End User's use only and may not be sold, redistributed or otherwise used for commercial purposes  All illustrations and images included in CareNotes® are the copyrighted property of A D A M [employer] Inc  or Ralf Beltran  The above information is an  only  It is not intended as medical advice for individual conditions or treatments  Talk to your doctor, nurse or pharmacist before following any medical regimen to see if it is safe and effective for you

## 2021-05-04 NOTE — ANESTHESIA POSTPROCEDURE EVALUATION
Post-Op Assessment Note    CV Status:  Stable  Pain Score: 2    Pain management: adequate     Mental Status:  Alert and awake   Hydration Status:  Euvolemic   PONV Controlled:  Controlled   Airway Patency:  Patent      Post Op Vitals Reviewed: Yes      Staff: Anesthesiologist         No complications documented      BP      Temp      Pulse    Resp      SpO2

## 2021-05-10 ENCOUNTER — TELEPHONE (OUTPATIENT)
Dept: OBGYN CLINIC | Facility: CLINIC | Age: 31
End: 2021-05-10

## 2021-05-10 NOTE — TELEPHONE ENCOUNTER
Post op patient called c/o still pelvic pain, feels a lump by navel, hot flashes, occ nausea, occ dizziness  Wants appointment  Given for 5/11  Advised rest, increase fluids, Ibuprofen

## 2021-05-11 ENCOUNTER — OFFICE VISIT (OUTPATIENT)
Dept: OBGYN CLINIC | Facility: CLINIC | Age: 31
End: 2021-05-11
Payer: COMMERCIAL

## 2021-05-11 VITALS — WEIGHT: 223 LBS | BODY MASS INDEX: 37.11 KG/M2 | SYSTOLIC BLOOD PRESSURE: 122 MMHG | DIASTOLIC BLOOD PRESSURE: 88 MMHG

## 2021-05-11 DIAGNOSIS — N76.0 BACTERIAL VAGINOSIS: Primary | ICD-10-CM

## 2021-05-11 DIAGNOSIS — N94.9 UTERINE TENDERNESS: ICD-10-CM

## 2021-05-11 DIAGNOSIS — Z97.5 IUD CONTRACEPTION: ICD-10-CM

## 2021-05-11 DIAGNOSIS — N80.3: ICD-10-CM

## 2021-05-11 DIAGNOSIS — B96.89 BACTERIAL VAGINOSIS: Primary | ICD-10-CM

## 2021-05-11 LAB
BV WHIFF TEST VAG QL: ABNORMAL
CLUE CELLS SPEC QL WET PREP: POSITIVE
PH SMN: 4.5 [PH]
SL AMB POCT WET MOUNT: YES
T VAGINALIS VAG QL WET PREP: NEGATIVE
YEAST VAG QL WET PREP: NEGATIVE

## 2021-05-11 PROCEDURE — 99024 POSTOP FOLLOW-UP VISIT: CPT | Performed by: OBSTETRICS & GYNECOLOGY

## 2021-05-11 PROCEDURE — 87210 SMEAR WET MOUNT SALINE/INK: CPT | Performed by: OBSTETRICS & GYNECOLOGY

## 2021-05-11 RX ORDER — METRONIDAZOLE 500 MG/1
500 TABLET ORAL 2 TIMES DAILY
Qty: 14 TABLET | Refills: 0 | Status: SHIPPED | OUTPATIENT
Start: 2021-05-11 | End: 2021-05-18

## 2021-05-11 NOTE — PATIENT INSTRUCTIONS
Bacterial Vaginosis   WHAT YOU NEED TO KNOW:   What is bacterial vaginosis? Bacterial vaginosis is an infection in the vagina  It may cause vaginitis (irritation and inflammation of the vagina)  The cause is not known  Bacteria normally found in the vagina are imbalanced  Your risk increases if you are sexually active, you use a douche, or you have an intrauterine device (IUD)  What are common signs and symptoms of bacterial vaginosis? · White, gray, or yellow vaginal discharge    · Vaginal discharge that smells like fish    · Itching or burning around the outside of your vagina    How is bacterial vaginosis diagnosed? Your healthcare provider will examine you and ask if you have other health conditions  He or she may need to take a sample of fluid from your vagina  This will be tested for the bacteria that causes vaginosis  How is bacterial vaginosis treated? Antibiotics are given to kill the bacteria  They may be given as a pill or as a cream to put in your vagina  What do I need to know about bacterial vaginosis and pregnancy? If you have bacterial vaginosis during pregnancy, your baby may be born early or have a low birth weight  Your healthcare provider may recommend testing for bacterial vaginosis before or during your pregnancy  He or she will talk to you about your risk for premature delivery, and make sure you know the benefits and risks of testing  How can I prevent bacterial vaginosis? · Keep your vaginal area clean and dry  Wear underwear and pantyhose with a cotton crotch  Wipe from front to back after you urinate or have a bowel movement  After you bathe, rinse soap from your vaginal area to decrease your risk for irritation  · Do not use products that cause irritation  Always use unscented tampons or sanitary pads  Do not use feminine sprays, powders, or scented tampons  They may cause irritation and increase your risk for vaginosis   Detergents and fabric softeners may also cause irritation  · Do not use a douche  This can cause an imbalance in healthy vaginal bacteria  · Use latex condoms during sex  This helps prevent another infection and keeps your partner from getting the infection  When should I call my doctor or gynecologist?   · Your symptoms come back or do not improve with treatment  · You have vaginal bleeding that is not your monthly period  · You have questions or concerns about your condition or care  CARE AGREEMENT:   You have the right to help plan your care  Learn about your health condition and how it may be treated  Discuss treatment options with your healthcare providers to decide what care you want to receive  You always have the right to refuse treatment  The above information is an  only  It is not intended as medical advice for individual conditions or treatments  Talk to your doctor, nurse or pharmacist before following any medical regimen to see if it is safe and effective for you  © Copyright [address] 2021 Information is for End User's use only and may not be sold, redistributed or otherwise used for commercial purposes  All illustrations and images included in CareNotes® are the copyrighted property of [employer]  or [address] [place], [place] [address]bakari   Laparoscopic Tubal Ligation   WHAT YOU SHOULD KNOW:   A laparoscopic tubal ligation is surgery to close your fallopian tubes to prevent pregnancy  CARE AGREEMENT:   You have the right to help plan your care  Learn about your health condition and how it may be treated  Discuss treatment options with your caregivers to decide what care you want to receive  You always have the right to refuse treatment  RISKS:   You may bleed more than expected, have trouble breathing, or get an infection  Blood vessels or organs such as your bowel or bladder could be injured during surgery  Although pregnancy is unlikely after a tubal ligation, there is still a small chance that you may get pregnant   If pregnancy does occur, there is an increased risk for an ectopic pregnancy (tubal pregnancy)  A tubal ligation can be reversed, but it does not mean you will be able to get pregnant again  WHILE YOU ARE HERE:   Before your surgery:   · Informed consent  is a legal document that explains the tests, treatments, or procedures that you may need  Informed consent means you understand what will be done and can make decisions about what you want  You give your permission when you sign the consent form  You can have someone sign this form for you if you are not able to sign it  You have the right to understand your medical care in words you know  Before you sign the consent form, understand the risks and benefits of what will be done  Make sure all your questions are answered  · An IV  is a small tube placed in your vein that is used to give you medicine or liquids  · Anesthesia  is medicine to make you comfortable during the surgery  Caregivers will work with you to decide which anesthesia is best for you  ¨ General anesthesia  will keep you asleep and free from pain during surgery  Anesthesia may be given through your IV  You may instead breathe it in through a mask or a tube placed down your throat  The tube may cause you to have a sore throat when you wake up  ¨ Local anesthesia  is a shot of medicine put into the area where your surgery will be done  It is used to numb the area and dull the pain  You may still feel pressure or pushing during surgery  During your surgery:  One or more small incisions will be made in your abdomen  The scope and other instruments will be inserted into the incisions  Your abdomen may be filled with a gas called carbon dioxide  This makes it easier for your surgeon to see inside your abdomen  Your fallopian tubes then are closed with a type of clip or heat, or they are cut  After the surgery is done, the incisions are closed with stitches or staples    After your surgery: You will be taken to a room to rest until you are fully awake  Caregivers will monitor you closely for any problems  Do not get out of bed until your caregiver says it is okay  When your caregiver sees that you are okay, you will be able to go home or be taken to your hospital room  A bandage will cover the staples or stitches closing the incisions in your abdomen  · You will be able to drink liquids and eat certain foods once your stomach function returns  You may be given ice chips at first  Then you will get liquids such as water, broth, juice, and clear soft drinks  If your stomach does not become upset, you may then be given soft foods, such as ice cream and applesauce  Once you can eat soft foods easily, you may slowly begin to eat solid foods  · Medicines:      ¨ Antibiotics  help prevent a bacterial infection  ¨ Antinausea medicine  helps calm your stomach and prevents vomiting  ¨ Pain medicine  may be given  Do not wait until the pain is severe before you ask for more medicine  © 2014 [address] is for End User's use only and may not be sold, redistributed or otherwise used for commercial purposes  All illustrations and images included in CareNotes® are the copyrighted property of A D A M , Inc  or Familia Mcgrath  The above information is an  only  It is not intended as medical advice for individual conditions or treatments  Talk to your doctor, nurse or pharmacist before following any medical regimen to see if it is safe and effective for you

## 2021-05-11 NOTE — PROGRESS NOTES
Assessment/Plan   Diagnoses and all orders for this visit:    Bacterial vaginosis  -     metroNIDAZOLE (FLAGYL) 500 mg tablet; Take 1 tablet (500 mg total) by mouth 2 (two) times a day for 7 days  -     POCT wet mount    Endometriosis of the cul-de-sac    IUD contraception    Uterine tenderness    1  Bacterial vaginosis with uterine tenderness-exam with retroverted uterus which was tender on exam   Could represent possible early endometritis  Of note, no uterine manipulation was done at the time of surgery other than sponge stick in the vagina  Patient was counseled about the findings  Treatment options were reviewed  Electronic prescription for metronidazole 500 mg b i d  For 7 days was sent a local pharmacy  2  Pelvic pain-patient notes discomfort from moving from side to side  Definite uterine tenderness was noted on exam   Incision sites are intact without any hernia or hematoma/infection appreciated  We will treat with metronidazole as noted above and proceed accordingly  3  Mirena IUD-placed 10/30/18  IUD string was seen at a length of 1 cm at  previous visit  It was also noted before and after surgery  String was not seen today  Likely, the string curled up into the cervix  We will check ultrasound and proceed accordingly  Of note, patient has had no bleeding after surgery, consistent secondary oligomenorrhea related to IUD  4  Secondary oligomenorrhea-as noted above  5  Postoperatively-status post tubal ligation with incision sites clean, dry, intact  Pathology consistent with bilateral tubes  Intraoperative photos were shown to the patient  Follow-up 1 week for exam with ultrasound  Subjective   Patient ID: Gary Hatch is a 31 y o  female  Vitals:    05/11/21 1655   BP: 122/88     Patient was seen today for follow-up visit  Please see assessment plan for details        The following portions of the patient's history were reviewed and updated as appropriate: allergies, current medications, past family history, past medical history, past social history, past surgical history and problem list   Past Medical History:   Diagnosis Date    Abdominal pain     Anxiety     Asthma     as young child    [facility]ipolar disorder [facility] [address] [facility]     Chronic pain disorder     right hip    Depression     Diarrhea     Endometrial disorder 2012    Endometriosis     Epigastric pain     Fatigue     Morbid obesity ([facility] [address] [facility]     Nausea and vomiting      Past Surgical History:   Procedure Laterality Date    DIAGNOSTIC LAPAROSCOPY      DIAGNOSTIC LAPAROSCOPY      DILATION AND CURETTAGE OF UTERUS      FL COLONOSCOPY FLX DX W/COLLJ SPEC WHEN PFRMD N/A 2017    Procedure: EGD with bx AND COLONOSCOPY with bx;  Surgeon: Richard Steel MD;  Location: [facility]; Service: Gastroenterology    FL LAP,RMV  ADNEXAL STRUCTURE Bilateral 2021    Procedure: LAP SALPINGECTOMY;  fulgeration of endometriosis; Surgeon: Kimberly Arce MD;  Location: [facility];  Service: Gynecology     OB History    Para Term  AB Living   5 3 3 0 2 3   SAB TAB Ectopic Multiple Live Births   0 2 0 0 3      # Outcome Date GA Lbr Gregor/2nd Weight Sex Delivery Anes PTL Lv   5 TAB 18           4 Term 16 40w2d 08:55 / 00:07 3590 g (7 lb 14 6 oz) F Vag-Spont None  SRINI   3 Term 14    F Vag-Spont None  SRINI   2 Term 01/20/10    M Vag-Spont None  SRINI      Complications:  Other Excessive Bleeding   1 TAB                Current Outpatient Medications:     acetaminophen (TYLENOL) 650 mg CR tablet, Take 1 tablet (650 mg total) by mouth every 8 (eight) hours as needed for mild pain, Disp: 30 tablet, Rfl: 0    ibuprofen (MOTRIN) 600 mg tablet, Take 1 tablet (600 mg total) by mouth every 6 (six) hours as needed for mild pain, Disp: 30 tablet, Rfl: 0    NON FORMULARY, Flinstone vitamins 2 tab PO daily, Disp: , Rfl:     oxyCODONE (ROXICODONE) 5 mg immediate release tablet, Take 1 tablet (5 mg total) by mouth every 6 (six) hours as needed for moderate painMax Daily Amount: 20 mg, Disp: 10 tablet, Rfl: 0    metroNIDAZOLE (FLAGYL) 500 mg tablet, Take 1 tablet (500 mg total) by mouth 2 (two) times a day for 7 days, Disp: 14 tablet, Rfl: 0  Allergies   Allergen Reactions    Shellfish-Derived Products - Food Allergy Anaphylaxis    Adhesive [Medical Tape] Swelling    Chlorhexidine Itching     burning    Other Swelling     All fruits     Social History     Socioeconomic History    Marital status: Single     Spouse name: None    Number of children: None    Years of education: None    Highest education level: None   Occupational History    None   Social Needs    Financial resource strain: None    Food insecurity     Worry: None     Inability: None    Transportation needs     Medical: None     Non-medical: None   Tobacco Use    Smoking status: Current Some Day Smoker     Packs/day: 0 25     Types: Cigarettes    Smokeless tobacco: Never Used    Tobacco comment: 1/2 pack/ week   Substance and Sexual Activity    Alcohol use: Yes     Frequency: 4 or more times a week     Drinks per session: 1 or 2     Comment: 2-3 glass/day wine or liquor    Drug use: Yes     Types: Marijuana     Comment: smokes daily    Sexual activity: Yes     Partners: Male     Birth control/protection: Inserts   Lifestyle    Physical activity     Days per week: None     Minutes per session: None    Stress: None   Relationships    Social connections     Talks on phone: None     Gets together: None     Attends Gnosticist service: None     Active member of club or organization: None     Attends meetings of clubs or organizations: None     Relationship status: None    Intimate partner violence     Fear of current or ex partner: None     Emotionally abused: None     Physically abused: None     Forced sexual activity: None   Other Topics Concern    None   Social History Narrative    None     Family History   Problem Relation Age of Onset    No Known Problems Mother     No Known Problems Father        Review of Systems   Constitutional: Negative for chills, diaphoresis, fatigue and fever  Respiratory: Negative for apnea, cough, chest tightness, shortness of breath and wheezing  Cardiovascular: Negative for chest pain, palpitations and leg swelling  Gastrointestinal: Negative for abdominal distention, abdominal pain, anal bleeding, constipation, diarrhea, nausea, rectal pain and vomiting  Genitourinary: Negative for difficulty urinating, dyspareunia, dysuria, frequency, hematuria, menstrual problem, pelvic pain, urgency, vaginal bleeding, vaginal discharge and vaginal pain  Musculoskeletal: Negative for arthralgias, back pain and myalgias  Skin: Negative for color change and rash  Neurological: Negative for dizziness, syncope, light-headedness, numbness and headaches  Hematological: Negative for adenopathy  Does not bruise/bleed easily  Psychiatric/Behavioral: Negative for dysphoric mood and sleep disturbance  The patient is not nervous/anxious  Objective   Physical Exam    Objective      /88 (BP Location: Left arm, Patient Position: Sitting, Cuff Size: Adult)   Wt 101 kg (223 lb)   BMI 37 11 kg/m²     General:   alert and oriented, in no acute distress   Neck:    Breast:    Heart:    Lungs:    Abdomen: soft, non-tender, without masses or organomegaly and soft   Vulva: normal   Vagina: Without erythema or lesions or discharge  Normal   Cervix: Without lesions or discharge or cervicitis  No Cervical motion tenderness    No IUD string was seen   Uterus: top normal size, retroverted, Mild to moderately tender   Adnexa: no mass, fullness, tenderness   Rectum: negative    Psych:  Normal mood and affect   Skin:  Without obvious lesions   Eyes: symmetric, with normal movements and reactivity   Musculoskeletal:  Normal muscle tone and movements appreciated

## 2021-05-18 ENCOUNTER — ULTRASOUND (OUTPATIENT)
Dept: OBGYN CLINIC | Facility: CLINIC | Age: 31
End: 2021-05-18
Payer: COMMERCIAL

## 2021-05-18 ENCOUNTER — OFFICE VISIT (OUTPATIENT)
Dept: OBGYN CLINIC | Facility: CLINIC | Age: 31
End: 2021-05-18
Payer: COMMERCIAL

## 2021-05-18 VITALS — SYSTOLIC BLOOD PRESSURE: 130 MMHG | DIASTOLIC BLOOD PRESSURE: 90 MMHG | WEIGHT: 220 LBS | BODY MASS INDEX: 36.61 KG/M2

## 2021-05-18 DIAGNOSIS — N80.9 ENDOMETRIOSIS: ICD-10-CM

## 2021-05-18 DIAGNOSIS — Z97.5 IUD CONTRACEPTION: Primary | ICD-10-CM

## 2021-05-18 DIAGNOSIS — R10.2 FEMALE PELVIC PAIN: ICD-10-CM

## 2021-05-18 DIAGNOSIS — N91.4 SECONDARY OLIGOMENORRHEA: ICD-10-CM

## 2021-05-18 DIAGNOSIS — N83.201 RIGHT OVARIAN CYST: ICD-10-CM

## 2021-05-18 PROCEDURE — 76830 TRANSVAGINAL US NON-OB: CPT | Performed by: OBSTETRICS & GYNECOLOGY

## 2021-05-18 PROCEDURE — 99214 OFFICE O/P EST MOD 30 MIN: CPT | Performed by: OBSTETRICS & GYNECOLOGY

## 2021-05-18 NOTE — PATIENT INSTRUCTIONS
Endometriosis   WHAT YOU NEED TO KNOW:   What is endometriosis? Endometriosis is a condition in which tissue that is normally only in your uterus grows outside of the uterus  Endometriosis causes tissue that should be shed during a monthly period to grow on your ovaries, fallopian tubes, bladder, or other organs  Organs and tissue may stick together and cause inflammation and pain  What increases my risk for endometriosis? The cause of endometriosis may not be known  Any of the following may increase your risk:  · Monthly period that started early    · Older than 35 when you had your first child    · Menopause after age 54    · Born with a narrow cervix or vagina, or no opening of your cervix or vagina    · Family history of endometriosis    · A weak immune system    What are the signs and symptoms of endometriosis? · Abdominal pain or nausea and vomiting before or during your period    · Painful periods     · Feeling full or bloated    · Dizziness or fatigue    · Heavy periods, or vaginal bleeding at times other than during your monthly period    · Infertility (being unable to get pregnant)    · Lower back pain or painful bowel movements during your monthly periods    · Pain during or after sex    · Pain when you urinate    How is endometriosis diagnosed? Your healthcare provider will examine you and ask you questions about other medical conditions you may have  He may ask about your menstrual history, pregnancies, and if you have a family member with endometriosis  You may also have one or more of the following tests:  · A blood test  can check for pregnancy, sexually transmitted infection, and anemia from blood loss  · A pelvic exam  may be needed to check the size and shape of your uterus, cervix, and ovaries  This may also help to find areas of endometriosis  · A vaginal ultrasound  uses sound waves to show pictures of your uterus and ovaries on a monitor   An ultrasound may be done to show endometriosis  · A CT or MRI  may show the endometriosis  You may be given contrast liquid to help your abdomen show up better in the pictures  Tell the healthcare provider if you have ever had an allergic reaction to contrast liquid  Do not enter the MRI room with anything metal  Metal can cause serious injury  Tell the healthcare provider if you have any metal in or on your body  · Laparoscopy  is a procedure used to look inside your abdomen  A piece of tissue may be removed from your ovaries, fallopian tubes, bowels, or other organs  The tissues are sent to a lab for tests to see if endometriosis is present  How is endometriosis treated? · Medicines:      ? Hormones  may help shrink endometrial tissue and decrease pain and inflammation  You may be given birth control pills, androgen hormones, or medicine that makes your body produce less of certain hormones  ? Acetaminophen  decreases pain and is available without a doctor's order  Ask how much to take and how often to take it  Follow directions  Acetaminophen can cause liver damage if not taken correctly  ? NSAIDs , such as ibuprofen, help decrease swelling, pain, and fever  This medicine is available with or without a doctor's order  NSAIDs can cause stomach bleeding or kidney problems in certain people  If you take blood thinner medicine, always ask your healthcare provider if NSAIDs are safe for you  Always read the medicine label and follow directions  · Surgery  may be needed to determine if you have endometriosis  Endometrial tissue that is growing in the wrong places may be removed  How can I manage my symptoms? · Apply heat  on your abdomen for 20 to 30 minutes every 2 hours for as many days as directed  Heat helps decrease pain and muscle spasms  · Exercise regularly  to help reduce symptoms, such as pain  Ask about the best exercise plan for you  Where can I find more information?    · [employer]  [address] [place] [address]  Phone: [phone]  Phone: [phone]  Web Address: http://[employer]    When should I seek immediate care? · You have severe pain that does not go away after you take pain medicine  When should I contact my healthcare provider? · Your symptoms return after treatment  · You have heavy or unusual vaginal bleeding  · You see blood in your urine or bowel movement  · You have questions or concerns about your condition or care  CARE AGREEMENT:   You have the right to help plan your care  Learn about your health condition and how it may be treated  Discuss treatment options with your healthcare providers to decide what care you want to receive  You always have the right to refuse treatment  The above information is an  only  It is not intended as medical advice for individual conditions or treatments  Talk to your doctor, nurse or pharmacist before following any medical regimen to see if it is safe and effective for you  © Copyright [employer] 2021 Information is for End User's use only and may not be sold, redistributed or otherwise used for commercial purposes  All illustrations and images included in CareNotes® are the copyrighted property of [employer]  or [facility] [place]     Ovarian Cyst   WHAT YOU NEED TO KNOW:   What is an ovarian cyst?  An ovarian cyst is a fluid-filled sac that grows in or on an ovary  You have 2 ovaries, 1 on each side of your uterus  They are small, about the size and shape of an almond  Ovarian cysts are common in women who have regular monthly cycles  During your monthly cycle, eggs are released from the ovaries  The cyst usually contains fluid but may sometimes have blood or tissue in it  Most ovarian cysts are harmless and go away without treatment in a few months  Some cysts can grow large, cause pain, or break open        What causes an ovarian cyst? · Problems with your hormones    · Medicines that help you ovulate    · Endometriosis    · Pregnancy    · A severe infection in your pelvis    What are the signs and symptoms of an ovarian cyst?  You may have pressure, bloating or swelling in your lower abdomen on the side of the cyst  You may also have dull or sharp pain that may come and go  The following are less common signs and symptoms:  · A dull ache in your lower back and thighs    · Unusual vaginal bleeding    · Weight gain you did not expect or plan    · Pain during your monthly cycle    · Tenderness in your breasts    · Trouble completely emptying your bowels or bladder    · The need to urinate often    · Pain during sex    How is an ovarian cyst diagnosed? · Blood tests  may show what type of cyst you have and if you need treatment  · A pelvic exam  may help your healthcare provider feel an ovarian cyst     · A pelvic ultrasound  may show a cyst on your ovary  An ultrasound wand uses sound waves to show pictures on a monitor  The wand is inserted into your vagina and guided up toward your uterus  How is an ovarian cyst treated? Treatment will depend on your age, symptoms, and the kind of cyst you have  You may need any of the following:  · Watchful waiting  may be recommended  This means the cyst is not treated right away  You will need to watch for any signs or symptoms that the cyst is growing  You may need to return for one or more ultrasounds after a certain period of time  These will show if your cyst has changed in size  · Medicines:      ? Birth control pills  may help control your monthly cycle, prevent cysts, or cause them to shrink  ? Acetaminophen  decreases pain and fever  It is available without a doctor's order  Ask how much to take and how often to take it  Follow directions   Read the labels of all other medicines you are using to see if they also contain acetaminophen, or ask your doctor or pharmacist  Acetaminophen can cause liver damage if not taken correctly  Do not use more than 4 grams (4,000 milligrams) total of acetaminophen in one day  ? NSAIDs , such as ibuprofen, help decrease swelling, pain, and fever  This medicine is available with or without a doctor's order  NSAIDs can cause stomach bleeding or kidney problems in certain people  If you take blood thinner medicine, always ask your healthcare provider if NSAIDs are safe for you  Always read the medicine label and follow directions  ? Prescription pain medicine  may be given  Ask your healthcare provider how to take this medicine safely  Some prescription pain medicines contain acetaminophen  Do not take other medicines that contain acetaminophen without talking to your healthcare provider  Too much acetaminophen may cause liver damage  Prescription pain medicine may cause constipation  Ask your healthcare provider how to prevent or treat constipation  · Surgery  may be needed to remove the ovarian cyst     How can I manage ovarian cysts? You can manage a current cyst and help healthcare providers find future cysts early  · Apply heat to decrease pain and cramping from a cyst   Sit in a warm bath, or place a heating pad (turned on low) on your abdomen  Do this for 15 to 20 minutes every hour for comfort  · Get regular pelvic exams or Pap smears  This will help providers find any new ovarian cysts  Tell your healthcare provider about any unusual changes in your monthly cycle  Call your local emergency number (911 in the [address] [facility]) if:   · You have severe pain with fever and vomiting  · You have sudden, severe abdominal pain  · You are too weak, faint, or dizzy to stand up  · You are breathing very quickly  When should I call my doctor? · Your periods are early, late, or more painful than usual     · You have questions or concerns about your condition or care  CARE AGREEMENT:   You have the right to help plan your care   Learn about your health condition and how it may be treated  Discuss treatment options with your healthcare providers to decide what care you want to receive  You always have the right to refuse treatment  The above information is an  only  It is not intended as medical advice for individual conditions or treatments  Talk to your doctor, nurse or pharmacist before following any medical regimen to see if it is safe and effective for you  © Copyright [employer] 2021 Information is for End User's use only and may not be sold, redistributed or otherwise used for commercial purposes   All illustrations and images included in CareNotes® are the copyrighted property of [employer]  or [address] [place] [address]

## 2021-05-18 NOTE — PROGRESS NOTES
Assessment/Plan   Diagnoses and all orders for this visit:    IUD contraception    Secondary oligomenorrhea    Endometriosis    Right ovarian cyst    1  History of BV with uterine tenderness-resolved after treatment from last visit with metronidazole  Patient will call or return with any symptoms  2  Postop laparoscopic bilateral salpingectomy-healing well  Incision sites are clean, dry, intact  3  Mirena IUD-placed 10/30/18  IUD was seen perioperatively, not seen at last visit on 5/11/21  Today, was noted at 1-1 5 cm IUD string length  IUD is in good position on ultrasound today  She will continue with this  4  Secondary oligomenorrhea-related to Mirena IUD  5  Endometriosis-noted at the time of laparoscopy  Fulguration was done in the midline cul-de-sac, left pelvic sidewall endometriosis not fulgurated secondary to proximity to the ureter  She will continue with Mirena IUD with good results  6  Right ovarian cyst-noted on ultrasound today, 3 4 cm simple cyst   Some discomfort was noted in this area  Recommend Tylenol or Motrin as needed  Some free fluid in the cul-de-sac was noted, likely from partial cyst rupture  Follow-up 6-8 weeks time for ultrasound and office visit with me  7  Pelvic pain-overall, has been improved until the cyst was noted  To continue current management with Mirena IUD  Follow-up 6 8 weeks time for ultrasound and office visit with me  Subjective   Patient ID: Davidson Burgess is a 31 y o  female  Vitals:    05/18/21 1332   BP: 130/90     Patient was seen today for ultrasound and follow-up visit  Please see assessment plan for details        The following portions of the patient's history were reviewed and updated as appropriate: allergies, current medications, past family history, past medical history, past social history, past surgical history and problem list   Past Medical History:   Diagnosis Date    Abdominal pain     Anxiety     Asthma     as young child    Bipolar disorder ([facility] [address] )     Chronic pain disorder     right hip    Depression     Diarrhea     Endometrial disorder 2012    Endometriosis     Epigastric pain     Fatigue     Morbid obesity (HCC)     Nausea and vomiting      Past Surgical History:   Procedure Laterality Date    DIAGNOSTIC LAPAROSCOPY      DIAGNOSTIC LAPAROSCOPY      DILATION AND CURETTAGE OF UTERUS      PA COLONOSCOPY FLX DX W/COLLJ SPEC WHEN PFRMD N/A 2017    Procedure: EGD with bx AND COLONOSCOPY with bx;  Surgeon: Salvador France MD;  Location: [facility]; Service: Gastroenterology    PA LAP,RMV  ADNEXAL STRUCTURE Bilateral 2021    Procedure: LAP SALPINGECTOMY;  fulgeration of endometriosis; Surgeon: Shaheen Sorensen MD;  Location: [facility];  Service: Gynecology     OB History    Para Term  AB Living   5 3 3 0 2 3   SAB TAB Ectopic Multiple Live Births   0 2 0 0 3      # Outcome Date GA Lbr Gregor/2nd Weight Sex Delivery Anes PTL Lv   5 TAB 18           4 Term 16 40w2d 08:55 / 00:07 3590 g (7 lb 14 6 oz) F Vag-Spont None  SRINI   3 Term 14    F Vag-Spont None  SRINI   2 Term 01/20/10    M Vag-Spont None  SRINI      Complications:  Other Excessive Bleeding   1 TAB                Current Outpatient Medications:     acetaminophen (TYLENOL) 650 mg CR tablet, Take 1 tablet (650 mg total) by mouth every 8 (eight) hours as needed for mild pain, Disp: 30 tablet, Rfl: 0    ibuprofen (MOTRIN) 600 mg tablet, Take 1 tablet (600 mg total) by mouth every 6 (six) hours as needed for mild pain, Disp: 30 tablet, Rfl: 0    metroNIDAZOLE (FLAGYL) 500 mg tablet, Take 1 tablet (500 mg total) by mouth 2 (two) times a day for 7 days, Disp: 14 tablet, Rfl: 0    NON FORMULARY, Flinstone vitamins 2 tab PO daily, Disp: , Rfl:     oxyCODONE (ROXICODONE) 5 mg immediate release tablet, Take 1 tablet (5 mg total) by mouth every 6 (six) hours as needed for moderate painMax Daily Amount: 20 mg, Disp: 10 tablet, Rfl: 0  Allergies   Allergen Reactions    Shellfish-Derived Products - Food Allergy Anaphylaxis    Adhesive [Medical Tape] Swelling    Chlorhexidine Itching     burning    Other Swelling     All fruits     Social History     Socioeconomic History    Marital status: Single     Spouse name: Not on file    Number of children: Not on file    Years of education: Not on file    Highest education level: Not on file   Occupational History    Not on file   Social Needs    Financial resource strain: Not on file    Food insecurity     Worry: Not on file     Inability: Not on file    Transportation needs     Medical: Not on file     Non-medical: Not on file   Tobacco Use    Smoking status: Current Some Day Smoker     Packs/day: 0 25     Types: Cigarettes    Smokeless tobacco: Never Used    Tobacco comment: 1/2 pack/ week   Substance and Sexual Activity    Alcohol use: Yes     Frequency: 4 or more times a week     Drinks per session: 1 or 2     Comment: 2-3 glass/day wine or liquor    Drug use: Yes     Types: Marijuana     Comment: smokes daily    Sexual activity: Yes     Partners: Male     Birth control/protection: Inserts   Lifestyle    Physical activity     Days per week: Not on file     Minutes per session: Not on file    Stress: Not on file   Relationships    Social connections     Talks on phone: Not on file     Gets together: Not on file     Attends [employer] service: Not on file     Active member of club or organization: Not on file     Attends meetings of clubs or organizations: Not on file     Relationship status: Not on file    Intimate partner violence     Fear of current or ex partner: Not on file     Emotionally abused: Not on file     Physically abused: Not on file     Forced sexual activity: Not on file   Other Topics Concern    Not on file   Social History Narrative    Not on file     Family History   Problem Relation Age of Onset    No Known Problems Mother     No Known Problems Father Review of Systems   Constitutional: Negative for chills, diaphoresis, fatigue and fever  Respiratory: Negative for apnea, cough, chest tightness, shortness of breath and wheezing  Cardiovascular: Negative for chest pain, palpitations and leg swelling  Gastrointestinal: Negative for abdominal distention, abdominal pain, anal bleeding, constipation, diarrhea, nausea, rectal pain and vomiting  Genitourinary: Negative for difficulty urinating, dyspareunia, dysuria, frequency, hematuria, menstrual problem, pelvic pain, urgency, vaginal bleeding, vaginal discharge and vaginal pain  Musculoskeletal: Negative for arthralgias, back pain and myalgias  Skin: Negative for color change and rash  Neurological: Negative for dizziness, syncope, light-headedness, numbness and headaches  Hematological: Negative for adenopathy  Does not bruise/bleed easily  Psychiatric/Behavioral: Negative for dysphoric mood and sleep disturbance  The patient is not nervous/anxious  Objective   Physical Exam    Objective      /90 (BP Location: Left arm, Patient Position: Sitting, Cuff Size: Adult)   Wt 99 8 kg (220 lb)   BMI 36 61 kg/m²     General:   alert and oriented, in no acute distress   Neck:    Breast:    Heart:    Lungs:    Abdomen: soft, non-tender, without masses or organomegaly   Vulva: normal   Vagina: Without erythema or lesions or discharge  Normal   Cervix: Without lesions or discharge or cervicitis  No Cervical motion tenderness  IUD string seen at a length of 1 5 cm   Uterus: top normal size, retroverted, non-tender   Adnexa: Right adnexa full and tender    Left was negative   Rectum: deferred    Psych:  Normal mood and affect   Skin:  Without obvious lesions   Eyes: symmetric, with normal movements and reactivity   Musculoskeletal:  Normal muscle tone and movements appreciated

## 2021-05-18 NOTE — PROGRESS NOTES
AMB US Pelvic Non OB    Date/Time: 5/18/2021 7:02 AM  Performed by: Michi Gill  Authorized by: Denice Zamora MD   Universal Protocol:  Patient identity confirmed: verbally with patient      Procedure details:     Technique:  Transvaginal US, Non-OB    Position: lithotomy exam    Uterine findings:     Length (cm): 7 74    Height (cm):  4 42    Width (cm):  5 6    Endometrial stripe: identified      Endometrium thickness (mm):  7 1  Left ovary findings:     Left ovary:  Visualized    Length (cm): 4 16    Height (cm): 2 78    Width (cm): 2 66  Right ovary findings:     Right ovary:  Visualized    Length (cm): 4 31    Height (cm): 3 47    Width (cm): 4 51  Other findings:     Free pelvic fluid: identified    Post-Procedure Details:     Impression:  Retroverted uterus demonstrates an IUD in good position within the endometrium  Otherwise, the uterus and left ovary appear within normal limits  The right ovary demonstrates a 3 4cm simple cyst  There is a moderate amount of free fluid present within the cul de sac  Tolerance: Tolerated well, no immediate complications    Complications: no complications    Additional Procedure Comments:      [employer] F8 E8C-RS transvaginal transducer Serial # [id number] was used to perform the examination today and subsequently followed with high level disinfection utilizing Trophon EPR procedure  Ultrasound performed at:     [address] [address]  Phone:  [phone]  Fax:  [phone]

## 2021-06-05 ENCOUNTER — HOSPITAL ENCOUNTER (EMERGENCY)
Facility: HOSPITAL | Age: 31
Discharge: HOME | End: 2021-06-05
Attending: EMERGENCY MEDICINE | Admitting: EMERGENCY MEDICINE
Payer: COMMERCIAL

## 2021-06-05 VITALS
DIASTOLIC BLOOD PRESSURE: 67 MMHG | SYSTOLIC BLOOD PRESSURE: 165 MMHG | HEART RATE: 65 BPM | WEIGHT: 218.7 LBS | OXYGEN SATURATION: 96 % | RESPIRATION RATE: 16 BRPM | TEMPERATURE: 97.9 F | BODY MASS INDEX: 36.39 KG/M2

## 2021-06-05 DIAGNOSIS — R10.2 SUPRAPUBIC PAIN: Primary | ICD-10-CM

## 2021-06-05 DIAGNOSIS — L76.82 PAIN AT SURGICAL INCISION: ICD-10-CM

## 2021-06-05 LAB
BILIRUB UR QL STRIP: NEGATIVE
CLARITY UR: CLEAR
COLOR UR: YELLOW
COLOR, POC: YELLOW
EXT PREG TEST URINE: NEGATIVE
EXT. CONTROL ED NAV: NORMAL
GLUCOSE UR STRIP-MCNC: NEGATIVE MG/DL
HGB UR QL STRIP.AUTO: NEGATIVE
KETONES UR STRIP-MCNC: NEGATIVE MG/DL
LEUKOCYTE ESTERASE UR QL STRIP: NEGATIVE
NITRITE UR QL STRIP: NEGATIVE
PH UR STRIP.AUTO: 7 [PH] (ref 4.5–8)
PROT UR STRIP-MCNC: NEGATIVE MG/DL
SP GR UR STRIP.AUTO: >=1.03 (ref 1–1.03)
UROBILINOGEN UR QL STRIP.AUTO: 0.2 E.U./DL

## 2021-06-05 PROCEDURE — 81003 URINALYSIS AUTO W/O SCOPE: CPT

## 2021-06-05 PROCEDURE — 99284 EMERGENCY DEPT VISIT MOD MDM: CPT

## 2021-06-05 PROCEDURE — 81025 URINE PREGNANCY TEST: CPT | Performed by: PHYSICIAN ASSISTANT

## 2021-06-05 PROCEDURE — 96372 THER/PROPH/DIAG INJ SC/IM: CPT

## 2021-06-05 PROCEDURE — 87591 N.GONORRHOEAE DNA AMP PROB: CPT | Performed by: PHYSICIAN ASSISTANT

## 2021-06-05 PROCEDURE — 87491 CHLMYD TRACH DNA AMP PROBE: CPT | Performed by: PHYSICIAN ASSISTANT

## 2021-06-05 PROCEDURE — 99284 EMERGENCY DEPT VISIT MOD MDM: CPT | Performed by: PHYSICIAN ASSISTANT

## 2021-06-05 RX ORDER — DOXYCYCLINE HYCLATE 100 MG/1
100 CAPSULE ORAL 2 TIMES DAILY
Qty: 14 CAPSULE | Refills: 0 | Status: SHIPPED | OUTPATIENT
Start: 2021-06-05 | End: 2021-06-12

## 2021-06-05 RX ORDER — DOXYCYCLINE HYCLATE 100 MG/1
100 CAPSULE ORAL ONCE
Status: COMPLETED | OUTPATIENT
Start: 2021-06-05 | End: 2021-06-05

## 2021-06-05 RX ADMIN — CEFTRIAXONE 500 MG: 1 INJECTION, POWDER, FOR SOLUTION INTRAMUSCULAR; INTRAVENOUS at 21:22

## 2021-06-05 RX ADMIN — DOXYCYCLINE 100 MG: 100 CAPSULE ORAL at 21:23

## 2021-06-06 NOTE — ED PROVIDER NOTES
History  Chief Complaint   Patient presents with    Abdominal Pain     states ovarian tubes removed on May 4th with lump in abd and lower abd pain     51-year-old female with a history of endometriosis presents emergency department for evaluation of suprapubic pain, and concerned about her incision as well as dysuria and suprapubic abdominal pain  Patient reports that she had a bilateral salpingectomy that was laparoscopic on May 4th, and has had tenderness to the right-sided incisions since that time  She notes that the incision opened up slightly, did not notice any drainage, but noticed that there is a firm area  Patient has followed up with her OBGYN postoperatively and was noted to be doing well  A pelvic US was performed for pelvic pain on 5/18 whereby she was noted to have an ovarian cyst and free fluid in pelvic, concerning for a recently ruptured cyst   Patient reports the pain she feels "on the inside" is different from prior cyst ruptures  Patient also reports some dysuria, suprapubic abdominal pain, reports her urine is foul smelling  She denies any fevers, hematuria, nausea, vomiting, diarrhea  She reports her last BM was today  Patient reports she is in a monogamous relationship, but would like to be tested for gonorrhea and chlamydia        History provided by:  Patient and medical records   used: No    Difficulty Urinating  Pain quality:  Aching  Pain severity:  Mild  Onset quality:  Gradual  Duration:  4 days  Progression:  Unchanged  Chronicity:  New  Recent urinary tract infections: no    Relieved by:  None tried  Worsened by:  Nothing  Ineffective treatments:  None tried  Urinary symptoms: foul-smelling urine    Urinary symptoms: no frequent urination, no hematuria, no hesitancy and no bladder incontinence    Associated symptoms: abdominal pain    Associated symptoms: no fever, no flank pain, no genital lesions, no nausea, no vaginal discharge and no vomiting    Risk factors: sexually active    Risk factors: no hx of pyelonephritis, no hx of urolithiasis, no kidney transplant, not pregnant, no recurrent urinary tract infections and no single kidney        Prior to Admission Medications   Prescriptions Last Dose Informant Patient Reported? Taking? NON FORMULARY   Yes No   Sig: Flinstone vitamins 2 tab PO daily   acetaminophen (TYLENOL) 650 mg CR tablet   No No   Sig: Take 1 tablet (650 mg total) by mouth every 8 (eight) hours as needed for mild pain   ibuprofen (MOTRIN) 600 mg tablet   No No   Sig: Take 1 tablet (600 mg total) by mouth every 6 (six) hours as needed for mild pain   oxyCODONE (ROXICODONE) 5 mg immediate release tablet   No No   Sig: Take 1 tablet (5 mg total) by mouth every 6 (six) hours as needed for moderate painMax Daily Amount: 20 mg      Facility-Administered Medications: None       Past Medical History:   Diagnosis Date    Abdominal pain     Anxiety     Asthma     as young child    Bipolar disorder ([facility] [address] )     Chronic pain disorder     right hip    Depression     Diarrhea     Endometrial disorder 12/2012    Endometriosis     Epigastric pain     Fatigue     Morbid obesity (HCC)     Nausea and vomiting        Past Surgical History:   Procedure Laterality Date    DIAGNOSTIC LAPAROSCOPY      DIAGNOSTIC LAPAROSCOPY      DILATION AND CURETTAGE OF UTERUS      HI COLONOSCOPY FLX DX W/COLLJ SPEC WHEN PFRMD N/A 8/4/2017    Procedure: EGD with bx AND COLONOSCOPY with bx;  Surgeon: Tavo Gandhi MD;  Location: [facility]; Service: Gastroenterology    HI LAP,RMV  ADNEXAL STRUCTURE Bilateral 5/4/2021    Procedure: LAP SALPINGECTOMY;  fulgeration of endometriosis; Surgeon: Abran Garcia MD;  Location: [facility];  Service: Gynecology       Family History   Problem Relation Age of Onset    No Known Problems Mother     No Known Problems Father      I have reviewed and agree with the history as documented      E-Cigarette/Vaping    E-Cigarette Use Never User E-Cigarette/Vaping Substances    Nicotine No     THC No     CBD No     Flavoring No     Other No     Unknown No      Social History     Tobacco Use    Smoking status: Current Some Day Smoker     Packs/day: 0 25     Types: Cigarettes    Smokeless tobacco: Never Used    Tobacco comment: 1/2 pack/ week   Substance Use Topics    Alcohol use: Yes     Frequency: 4 or more times a week     Drinks per session: 1 or 2     Comment: 2-3 glass/day wine or liquor    Drug use: Yes     Types: Marijuana     Comment: smokes daily       Review of Systems   Constitutional: Negative for chills and fever  Respiratory: Negative for shortness of breath  Cardiovascular: Negative for chest pain  Gastrointestinal: Positive for abdominal pain  Negative for blood in stool, constipation, nausea and vomiting  Genitourinary: Positive for dysuria  Negative for flank pain and vaginal discharge  All other systems reviewed and are negative  Physical Exam  Physical Exam  Vitals signs and nursing note reviewed  Constitutional:       General: She is not in acute distress  Appearance: She is well-developed  She is not ill-appearing or toxic-appearing  HENT:      Head: Normocephalic and atraumatic  Right Ear: Hearing and external ear normal       Left Ear: Hearing and external ear normal       Nose: Nose normal    Eyes:      Conjunctiva/sclera: Conjunctivae normal    Neck:      Musculoskeletal: Full passive range of motion without pain and neck supple  Cardiovascular:      Rate and Rhythm: Normal rate and regular rhythm  Heart sounds: Normal heart sounds, S1 normal and S2 normal  No murmur  Pulmonary:      Effort: Pulmonary effort is normal       Breath sounds: Normal breath sounds  No decreased breath sounds, wheezing, rhonchi or rales  Abdominal:      General: Bowel sounds are normal       Palpations: Abdomen is soft  Tenderness: There is abdominal tenderness in the suprapubic area   There is no right CVA tenderness, left CVA tenderness, guarding or rebound  Genitourinary:     Comments: deferred  Musculoskeletal:      Comments: Normal range of motion; no edema, tenderness, or deformities  Skin:     General: Skin is warm and dry  Findings: No rash  Neurological:      Mental Status: She is alert and oriented to person, place, and time  GCS: GCS eye subscore is 4  GCS verbal subscore is 5  GCS motor subscore is 6  Psychiatric:         Mood and Affect: Mood normal          Speech: Speech normal          Vital Signs  ED Triage Vitals [06/05/21 2020]   Temperature Pulse Respirations Blood Pressure SpO2   97 9 °F (36 6 °C) 65 16 165/67 96 %      Temp Source Heart Rate Source Patient Position - Orthostatic VS BP Location FiO2 (%)   Oral Monitor Sitting Right arm --      Pain Score       4           Vitals:    06/05/21 2020   BP: 165/67   Pulse: 65   Patient Position - Orthostatic VS: Sitting         Visual Acuity      ED Medications  Medications   cefTRIAXone (ROCEPHIN) 500 mg in lidocaine (PF) (XYLOCAINE-MPF) 1 % IM only syringe (500 mg Intramuscular Given 6/5/21 2122)   doxycycline hyclate (VIBRAMYCIN) capsule 100 mg (100 mg Oral Given 6/5/21 2123)       Diagnostic Studies  Results Reviewed     Procedure Component Value Units Date/Time    Chlamydia/GC amplified DNA by PCR [[id number]] Collected: 06/05/21 2104    Lab Status:  In process Specimen: Urine, Other Updated: 06/05/21 2106    POCT pregnancy, urine [[id number]]  (Normal) Resulted: 06/05/21 2104    Lab Status: Final result Updated: 06/05/21 2105     EXT PREG TEST UR (Ref: Negative) negative     Control valid    POCT urinalysis dipstick [[id number]]  (Normal) Resulted: 06/05/21 2104    Lab Status: Final result Specimen: Urine Updated: 06/05/21 2104     Color, UA yellow    Urine Macroscopic, POC [[id number]] Collected: 06/05/21 2102    Lab Status: Final result Specimen: Urine Updated: 06/05/21 2104     Color, UA Yellow     Clarity, UA Clear pH, UA 7 0     Leukocytes, UA Negative     Nitrite, UA Negative     Protein, UA Negative mg/dl      Glucose, UA Negative mg/dl      Ketones, UA Negative mg/dl      Urobilinogen, UA 0 2 E U /dl      Bilirubin, UA Negative     Blood, UA Negative     Specific Gravity, UA >=1 030    Narrative:      CLINITEK RESULT                 No orders to display              Procedures  Procedures         ED Course  ED Course as of Jun 05 2302   Sat Jun 05, 2021 2122 Color, UA: yellow   2122 PREGNANCY TEST URINE: negative   2122 Leukocytes, UA: Negative   2122 Nitrite, UA: Negative                                           MDM  Number of Diagnoses or Management Options  Pain at surgical incision:   Suprapubic pain:   Diagnosis management comments: 28-year-old female with a history of endometriosis presents emergency department for evaluation of suprapubic pain, and concerned about her incision as well as dysuria and suprapubic abdominal pain  Urinalysis without evidence of UTI  Will send urine for G/C  Counseled patient those results would be available in 2-3 days  Patient wishes to be empirically treated today  Will prescribe doxy BID x 7 days  Patient has mild incision separation, without evidence of infection  Suspect suture reaction with the induration and trace erythema  No fluctuance, streaking erythema, or fever to suspect deeper infection or abscess  Counseled patient to continue observing the area for signs of infection  Do not submerge the incision until fully healed  Patient with suprapubic pain, patient    After having reviewed the patient's history and examined the patient, I have considered the following differential diagnosis:  acute appendicitis, bowel obstruction, torsion and abscess  As the patient has a benign abdominal examination, without significant tenderness when palpating all 4 quadrants,  these urgent conditions can be reasonably excluded    I do not feel that further diagnostic studies are indicated at this time  Patient is well-hydrated, taking p o fluids and has no clinical signs of dehydration, therefore do not feel that intravenous fluids are indicated  I have counseled the patient this symptomatic care and home observation with follow-up with the primary care provider is all that I recommended this time, however should the patient's condition become worse they should be re-evaluated  The management plan was discussed in detail with the patient at bedside and all questions were answered  The prior to discharge, we provided both verbal and written instructions  We discussed with the patient the signs and symptoms for which to return to the emergency department  All questions were answered and patient was comfortable with the plan of care and discharged to home  Instructed the patient to follow up with the primary care provider and/or special as provided and their written instructions  The patient verbalized understanding of our discussion and plan of care, and agrees to return to the Emergency Department for concerns and progression of illness  Disposition  Final diagnoses:   Suprapubic pain   Pain at surgical incision     Time reflects when diagnosis was documented in both MDM as applicable and the Disposition within this note     Time User Action Codes Description Comment    6/5/2021  9:29 PM Brandon Khan Add [R10 2] Suprapubic pain     6/5/2021  9:29 PM Brandon Khan Add [Z20 2] Possible exposure to STD     6/5/2021  9:29 PM Brandon Khan Remove [Z20 2] Possible exposure to STD     6/5/2021  9:30 PM Brandon Khan Add [L76 82] Pain at surgical incision       ED Disposition     ED Disposition Condition Date/Time Comment    Discharge Stable Sat Jun 5, 2021  9:30 PM [facility] discharge to home/self care              Follow-up Information     Follow up With Specialties Details Why Contact Info Additional Information    [facility] [address] and Gynecology Schedule an appointment as soon as possible for a visit in 1 week  [address] [phone]  [facility], [address], [place], [address]   [facility] Emergency Department Emergency Medicine Go to  If symptoms worsen [address] [address]  [facility] Emergency Department, [address] [facility] , [place], [place], [address]          Discharge Medication List as of 6/5/2021  9:30 PM      CONTINUE these medications which have NOT CHANGED    Details   acetaminophen (TYLENOL) 650 mg CR tablet Take 1 tablet (650 mg total) by mouth every 8 (eight) hours as needed for mild pain, Starting Tue 5/4/2021, Normal      ibuprofen (MOTRIN) 600 mg tablet Take 1 tablet (600 mg total) by mouth every 6 (six) hours as needed for mild pain, Starting Tue 5/4/2021, Normal      NON FORMULARY Flinstone vitamins 2 tab PO daily, Historical Med      oxyCODONE (ROXICODONE) 5 mg immediate release tablet Take 1 tablet (5 mg total) by mouth every 6 (six) hours as needed for moderate painMax Daily Amount: 20 mg, Starting Tue 5/4/2021, Normal           No discharge procedures on file      PDMP Review     None          ED Provider  Electronically Signed by           Melissa Galarza PA-C  06/05/21 1229 English

## 2021-06-08 LAB
C TRACH DNA SPEC QL NAA+PROBE: NEGATIVE
N GONORRHOEA DNA SPEC QL NAA+PROBE: NEGATIVE

## 2021-06-22 ENCOUNTER — TELEPHONE (OUTPATIENT)
Dept: FAMILY MEDICINE CLINIC | Facility: CLINIC | Age: 31
End: 2021-06-22

## 2021-06-22 NOTE — TELEPHONE ENCOUNTER
Spoke with patient says she fainted recently with no injury pt felt nauseous and dizzy and was unable to recollect how long she was unconscious for informed that if this occurs again she should go to ED made same day appt for tomorrow

## 2021-06-24 ENCOUNTER — APPOINTMENT (EMERGENCY)
Dept: CT IMAGING | Facility: HOSPITAL | Age: 31
End: 2021-06-24
Payer: COMMERCIAL

## 2021-06-24 ENCOUNTER — OFFICE VISIT (OUTPATIENT)
Dept: FAMILY MEDICINE CLINIC | Facility: CLINIC | Age: 31
End: 2021-06-24

## 2021-06-24 ENCOUNTER — HOSPITAL ENCOUNTER (EMERGENCY)
Facility: HOSPITAL | Age: 31
Discharge: HOME | End: 2021-06-24
Attending: EMERGENCY MEDICINE
Payer: COMMERCIAL

## 2021-06-24 ENCOUNTER — APPOINTMENT (EMERGENCY)
Dept: RADIOLOGY | Facility: HOSPITAL | Age: 31
End: 2021-06-24
Payer: COMMERCIAL

## 2021-06-24 VITALS
OXYGEN SATURATION: 100 % | DIASTOLIC BLOOD PRESSURE: 59 MMHG | HEART RATE: 56 BPM | TEMPERATURE: 98.4 F | SYSTOLIC BLOOD PRESSURE: 104 MMHG | RESPIRATION RATE: 16 BRPM

## 2021-06-24 DIAGNOSIS — R00.1 SINUS BRADYCARDIA BY ELECTROCARDIOGRAPHY: ICD-10-CM

## 2021-06-24 DIAGNOSIS — G89.29 CHRONIC HEADACHES: ICD-10-CM

## 2021-06-24 DIAGNOSIS — R51.9 CHRONIC HEADACHES: ICD-10-CM

## 2021-06-24 DIAGNOSIS — R42 DIZZINESS: Primary | ICD-10-CM

## 2021-06-24 DIAGNOSIS — R00.2 PALPITATIONS: Primary | ICD-10-CM

## 2021-06-24 DIAGNOSIS — Z87.898 HX OF SYNCOPE: ICD-10-CM

## 2021-06-24 LAB
ALBUMIN SERPL BCP-MCNC: 3.6 G/DL (ref 3.5–5)
ALP SERPL-CCNC: 62 U/L (ref 46–116)
ALT SERPL W P-5'-P-CCNC: 24 U/L (ref 12–78)
ANION GAP SERPL CALCULATED.3IONS-SCNC: 7 MMOL/L (ref 4–13)
APTT PPP: 29 SECONDS (ref 23–37)
AST SERPL W P-5'-P-CCNC: 14 U/L (ref 5–45)
ATRIAL RATE: 52 BPM
BASOPHILS # BLD AUTO: 0.02 THOUSANDS/ΜL (ref 0–0.1)
BASOPHILS NFR BLD AUTO: 0 % (ref 0–1)
BILIRUB SERPL-MCNC: 0.43 MG/DL (ref 0.2–1)
BILIRUB UR QL STRIP: NEGATIVE
BUN SERPL-MCNC: 13 MG/DL (ref 5–25)
CALCIUM SERPL-MCNC: 8.3 MG/DL (ref 8.3–10.1)
CHLORIDE SERPL-SCNC: 107 MMOL/L (ref 100–108)
CLARITY UR: CLEAR
CO2 SERPL-SCNC: 27 MMOL/L (ref 21–32)
COLOR UR: YELLOW
CREAT SERPL-MCNC: 0.79 MG/DL (ref 0.6–1.3)
D DIMER PPP FEU-MCNC: <0.27 UG/ML FEU
EOSINOPHIL # BLD AUTO: 0.18 THOUSAND/ΜL (ref 0–0.61)
EOSINOPHIL NFR BLD AUTO: 3 % (ref 0–6)
ERYTHROCYTE [DISTWIDTH] IN BLOOD BY AUTOMATED COUNT: 12.7 % (ref 11.6–15.1)
EXT PREG TEST URINE: NEGATIVE
EXT. CONTROL ED NAV: NORMAL
GFR SERPL CREATININE-BSD FRML MDRD: 102 ML/MIN/1.73SQ M
GLUCOSE SERPL-MCNC: 79 MG/DL (ref 65–140)
GLUCOSE UR STRIP-MCNC: NEGATIVE MG/DL
HCT VFR BLD AUTO: 37.6 % (ref 34.8–46.1)
HGB BLD-MCNC: 12.3 G/DL (ref 11.5–15.4)
HGB UR QL STRIP.AUTO: NEGATIVE
IMM GRANULOCYTES # BLD AUTO: 0.01 THOUSAND/UL (ref 0–0.2)
IMM GRANULOCYTES NFR BLD AUTO: 0 % (ref 0–2)
INR PPP: 1.11 (ref 0.84–1.19)
KETONES UR STRIP-MCNC: NEGATIVE MG/DL
LEUKOCYTE ESTERASE UR QL STRIP: NEGATIVE
LYMPHOCYTES # BLD AUTO: 1.96 THOUSANDS/ΜL (ref 0.6–4.47)
LYMPHOCYTES NFR BLD AUTO: 29 % (ref 14–44)
MCH RBC QN AUTO: 30.6 PG (ref 26.8–34.3)
MCHC RBC AUTO-ENTMCNC: 32.7 G/DL (ref 31.4–37.4)
MCV RBC AUTO: 94 FL (ref 82–98)
MONOCYTES # BLD AUTO: 0.6 THOUSAND/ΜL (ref 0.17–1.22)
MONOCYTES NFR BLD AUTO: 9 % (ref 4–12)
NEUTROPHILS # BLD AUTO: 4.05 THOUSANDS/ΜL (ref 1.85–7.62)
NEUTS SEG NFR BLD AUTO: 59 % (ref 43–75)
NITRITE UR QL STRIP: NEGATIVE
NRBC BLD AUTO-RTO: 0 /100 WBCS
P AXIS: 40 DEGREES
PH UR STRIP.AUTO: 6.5 [PH] (ref 4.5–8)
PLATELET # BLD AUTO: 201 THOUSANDS/UL (ref 149–390)
PMV BLD AUTO: 10.5 FL (ref 8.9–12.7)
POTASSIUM SERPL-SCNC: 3.9 MMOL/L (ref 3.5–5.3)
PR INTERVAL: 152 MS
PROT SERPL-MCNC: 7.1 G/DL (ref 6.4–8.2)
PROT UR STRIP-MCNC: NEGATIVE MG/DL
PROTHROMBIN TIME: 14.1 SECONDS (ref 11.6–14.5)
QRS AXIS: 50 DEGREES
QRSD INTERVAL: 86 MS
QT INTERVAL: 450 MS
QTC INTERVAL: 418 MS
RBC # BLD AUTO: 4.02 MILLION/UL (ref 3.81–5.12)
SODIUM SERPL-SCNC: 141 MMOL/L (ref 136–145)
SP GR UR STRIP.AUTO: 1.02 (ref 1–1.03)
T WAVE AXIS: 18 DEGREES
TROPONIN I SERPL-MCNC: <0.02 NG/ML
TSH SERPL DL<=0.05 MIU/L-ACNC: 0.47 UIU/ML (ref 0.36–3.74)
UROBILINOGEN UR QL STRIP.AUTO: 0.2 E.U./DL
VENTRICULAR RATE: 52 BPM
WBC # BLD AUTO: 6.82 THOUSAND/UL (ref 4.31–10.16)

## 2021-06-24 PROCEDURE — 84443 ASSAY THYROID STIM HORMONE: CPT | Performed by: PHYSICIAN ASSISTANT

## 2021-06-24 PROCEDURE — 99285 EMERGENCY DEPT VISIT HI MDM: CPT | Performed by: PHYSICIAN ASSISTANT

## 2021-06-24 PROCEDURE — 93005 ELECTROCARDIOGRAM TRACING: CPT

## 2021-06-24 PROCEDURE — 3008F BODY MASS INDEX DOCD: CPT | Performed by: OBSTETRICS & GYNECOLOGY

## 2021-06-24 PROCEDURE — 99285 EMERGENCY DEPT VISIT HI MDM: CPT

## 2021-06-24 PROCEDURE — 99213 OFFICE O/P EST LOW 20 MIN: CPT | Performed by: FAMILY MEDICINE

## 2021-06-24 PROCEDURE — 70450 CT HEAD/BRAIN W/O DYE: CPT

## 2021-06-24 PROCEDURE — G1004 CDSM NDSC: HCPCS

## 2021-06-24 PROCEDURE — 85025 COMPLETE CBC W/AUTO DIFF WBC: CPT | Performed by: PHYSICIAN ASSISTANT

## 2021-06-24 PROCEDURE — 85610 PROTHROMBIN TIME: CPT | Performed by: PHYSICIAN ASSISTANT

## 2021-06-24 PROCEDURE — 93010 ELECTROCARDIOGRAM REPORT: CPT | Performed by: INTERNAL MEDICINE

## 2021-06-24 PROCEDURE — 81025 URINE PREGNANCY TEST: CPT | Performed by: PHYSICIAN ASSISTANT

## 2021-06-24 PROCEDURE — 36415 COLL VENOUS BLD VENIPUNCTURE: CPT | Performed by: PHYSICIAN ASSISTANT

## 2021-06-24 PROCEDURE — 84484 ASSAY OF TROPONIN QUANT: CPT | Performed by: PHYSICIAN ASSISTANT

## 2021-06-24 PROCEDURE — 71046 X-RAY EXAM CHEST 2 VIEWS: CPT

## 2021-06-24 PROCEDURE — 85730 THROMBOPLASTIN TIME PARTIAL: CPT | Performed by: PHYSICIAN ASSISTANT

## 2021-06-24 PROCEDURE — 93000 ELECTROCARDIOGRAM COMPLETE: CPT | Performed by: FAMILY MEDICINE

## 2021-06-24 PROCEDURE — 80053 COMPREHEN METABOLIC PANEL: CPT | Performed by: PHYSICIAN ASSISTANT

## 2021-06-24 PROCEDURE — 85379 FIBRIN DEGRADATION QUANT: CPT | Performed by: PHYSICIAN ASSISTANT

## 2021-06-24 PROCEDURE — 81003 URINALYSIS AUTO W/O SCOPE: CPT

## 2021-06-24 RX ORDER — ACETAMINOPHEN 325 MG/1
650 TABLET ORAL ONCE
Status: COMPLETED | OUTPATIENT
Start: 2021-06-24 | End: 2021-06-24

## 2021-06-24 RX ORDER — SODIUM CHLORIDE 9 MG/ML
3 INJECTION INTRAVENOUS
Status: DISCONTINUED | OUTPATIENT
Start: 2021-06-24 | End: 2021-06-24 | Stop reason: HOSPADM

## 2021-06-24 RX ADMIN — ACETAMINOPHEN 650 MG: 325 TABLET, FILM COATED ORAL at 19:32

## 2021-06-24 NOTE — PROGRESS NOTES
Assessment/Plan:    No problem-specific Assessment & Plan notes found for this encounter  Diagnoses and all orders for this visit:    Dizziness  Comments:  EKG - sinus bradycardia with sinus arrhythmia  sent for echo  check blood work  Orders:  -     TSH, 3rd generation with Free T4 reflex; Future  -     Cancel: Comprehensive metabolic panel; Future  -     CBC and differential; Future    Sinus bradycardia by electrocardiography  Comments:  sent for echo  Orders:  -     POCT ECG  -     Cancel: Echo complete with contrast if indicated; Future  -     Echo complete w/ contrast if indicated; Future          Subjective:      Patient ID: Chepe Schulz is a 31 y o  female  Three days ago, after smoked one cigarette, went up to brush teeth  Got dizzy and felt heart fluttering so went to sit on toilet  Sweated profusely while on the toilet  And then became nauseous  Keron Gilmer on floor as was taking off her shirt  While on floor, tried taking rest of clothes off while on the floor  Does not recall what happened after that  Does not know for how long she was unconscious  The next thing she knows is that she woke up to puddles of sweat on the bathroom floor  Since Monday, heart has been fluttering occurred twice per day, each episode     For the past two weeks, having shortness of breath  Associated with headache  No fevers, cough, congestion, runny nose, vomiting, diarrhea, constipation  Feels really cold right now, exhausted but has not really been sleeping  Received Moderna COVID vaccine on June 11, 2021  Second dose is July 10, 2021  Never got diagnosed with COVID    Tobacco: 1 pack lasts 7-10 days  Marijuana: daily   ETOH: weekends to point of getting drunk    Children: 11and 9year old girls and 6year old boy      During exam, patient was getting hot and cold  Developed sweats, nausea and dizziness        The following portions of the patient's history were reviewed and updated as appropriate: allergies, current medications, past family history, past medical history, past social history, past surgical history and problem list     Review of Systems   Constitutional: Positive for diaphoresis and fatigue  Negative for chills, fever and unexpected weight change  HENT: Negative for congestion, ear pain, rhinorrhea and sore throat  Eyes: Negative for pain and visual disturbance  Respiratory: Positive for shortness of breath  Negative for cough and chest tightness  Cardiovascular: Negative for chest pain, palpitations and leg swelling  Gastrointestinal: Positive for nausea  Negative for abdominal pain, constipation, diarrhea and vomiting  Genitourinary: Negative for difficulty urinating, dysuria and urgency  Musculoskeletal: Negative for arthralgias and back pain  Skin: Negative for rash  Neurological: Positive for dizziness and headaches  Negative for numbness  Psychiatric/Behavioral: Negative for behavioral problems and suicidal ideas  The patient is not nervous/anxious  Objective:      /80   Pulse 62   Temp 98 °F (36 7 °C) (Temporal)   Resp 16   Ht 5' 5" (1 651 m)   Wt 101 kg (223 lb)   SpO2 96%   BMI 37 11 kg/m²          Physical Exam  Constitutional:       Appearance: She is well-developed  HENT:      Head: Normocephalic and atraumatic  Right Ear: External ear normal       Left Ear: External ear normal       Nose: Nose normal    Eyes:      Conjunctiva/sclera: Conjunctivae normal       Pupils: Pupils are equal, round, and reactive to light  Cardiovascular:      Rate and Rhythm: Normal rate and regular rhythm  Heart sounds: Normal heart sounds  Pulmonary:      Effort: Pulmonary effort is normal       Breath sounds: Normal breath sounds  Abdominal:      General: Bowel sounds are normal       Palpations: Abdomen is soft  Musculoskeletal:         General: Normal range of motion  Cervical back: Normal range of motion and neck supple     Skin: General: Skin is warm  Neurological:      Mental Status: She is alert and oriented to person, place, and time     Psychiatric:         Behavior: Behavior normal

## 2021-06-24 NOTE — ED PROVIDER NOTES
History  Chief Complaint   Patient presents with    Dizziness     patient reports an episode of syncope on monday, states she was brushing her teeth and became faint "laid herself down"  denies striking her head  She states shes had episodes of her heart racing/fluttering and then stopping, also reports diaphoresis during these palpitation episodes  Patient is a 32 y/o female with no significant PMH who presents for evaluation of syncope and palpitations  Patient states that Monday (4 days ago) evening a little after 11pm she had just finished smoking a cigarrette and was getting ready for bed brushing her teeth when she started to feel nauseated, dizzy, hot, and racing heart  She felt like she was going to pass out so she sat down onto the toilet and then slumped onto the ground  She states she took her clothes off because she was hot and sweaty and that was the last thing she remembers  She states she was leaning against the toilet/tub and when she woke up she was laying on the bathroom floor  She states that she was able to get up and laid in bed that night because she was afraid to go to sleep  She states that since then she has been feeling intermittent episodes of feeling hot/sweaty and chills  She states she also gets some intermittent episodes of palpitations/flutter that lasts a few seconds and resolves  She states she's had these in the past for years infrequently but she never got it checked out  She feels like it is happening more the past few days  She states she occasionally will feel dizziness but states it's not associated with the palpitations  She also admits to headaches intermittently for the last few years that last a short time and resolve  She denies chest pain and shortness of breath but states she feels like she needs to take a deep breath/breath more frequently over the past week  Denies recent illnesses  She had a laproscopic tubal May 4, 2021   She denies hx of DVT/PE, leg swelling/calf pain, chest pain, cough, hemoptysis  No travel  She has the Mirena IUD  No personal or family hx of CAD/MI  Occasional alcohol use and marijuana use  No other drug use  None       Past Medical History:   Diagnosis Date    Abdominal pain     Anxiety     Asthma     as young child    Bipolar disorder ([facility] [address] )     Chronic pain disorder     right hip    Depression     Diarrhea     Endometrial disorder 12/2012    Endometriosis     Epigastric pain     Fatigue     Morbid obesity (HCC)     Nausea and vomiting        Past Surgical History:   Procedure Laterality Date    DIAGNOSTIC LAPAROSCOPY      DIAGNOSTIC LAPAROSCOPY      DILATION AND CURETTAGE OF UTERUS      NE COLONOSCOPY FLX DX W/COLLJ SPEC WHEN PFRMD N/A 8/4/2017    Procedure: EGD with bx AND COLONOSCOPY with bx;  Surgeon: Lety Rutherford MD;  Location: [facility]; Service: Gastroenterology    NE LAP,RMV  ADNEXAL STRUCTURE Bilateral 5/4/2021    Procedure: LAP SALPINGECTOMY;  fulgeration of endometriosis; Surgeon: Yola Al MD;  Location: [facility];  Service: Gynecology       Family History   Problem Relation Age of Onset    No Known Problems Mother     No Known Problems Father      I have reviewed and agree with the history as documented  E-Cigarette/Vaping    E-Cigarette Use Never User      E-Cigarette/Vaping Substances    Nicotine No     THC No     CBD No     Flavoring No     Other No     Unknown No      Social History     Tobacco Use    Smoking status: Former Smoker     Packs/day: 0 25     Types: Cigarettes    Smokeless tobacco: Never Used    Tobacco comment: 1/2 pack/ week   Vaping Use    Vaping Use: Never used   Substance Use Topics    Alcohol use: Yes     Comment: 2-3 glass/day wine or liquor    Drug use: Yes     Types: Marijuana     Comment: smokes daily       Review of Systems   Constitutional: Negative for fever  HENT: Negative for congestion, rhinorrhea, sinus pain and sore throat      Eyes: Negative for visual disturbance  Respiratory: Positive for chest tightness  Negative for cough, shortness of breath and wheezing  Cardiovascular: Positive for palpitations  Negative for chest pain and leg swelling  Gastrointestinal: Negative for abdominal pain, diarrhea, nausea and vomiting  Genitourinary: Negative for difficulty urinating, dysuria, frequency and hematuria  Musculoskeletal: Negative for back pain and neck pain  Skin: Negative for rash  Neurological: Positive for dizziness, syncope and headaches  Negative for weakness and numbness  All other systems reviewed and are negative  Physical Exam  Physical Exam  Vitals and nursing note reviewed  Constitutional:       General: She is not in acute distress  Appearance: Normal appearance  She is normal weight  She is not ill-appearing, toxic-appearing or diaphoretic  HENT:      Head: Normocephalic and atraumatic  Right Ear: External ear normal       Left Ear: External ear normal       Nose: Nose normal       Mouth/Throat:      Mouth: Mucous membranes are moist       Pharynx: Oropharynx is clear  No oropharyngeal exudate or posterior oropharyngeal erythema  Eyes:      Extraocular Movements: Extraocular movements intact  Conjunctiva/sclera: Conjunctivae normal       Pupils: Pupils are equal, round, and reactive to light  Cardiovascular:      Rate and Rhythm: Regular rhythm  Bradycardia present  Pulses: Normal pulses  Heart sounds: Normal heart sounds  No murmur heard  Pulmonary:      Effort: Pulmonary effort is normal  No respiratory distress  Breath sounds: Normal breath sounds  No stridor  No wheezing or rhonchi  Abdominal:      General: Abdomen is flat  Bowel sounds are normal  There is no distension  Palpations: Abdomen is soft  Tenderness: There is no abdominal tenderness  There is no guarding  Musculoskeletal:         General: Normal range of motion        Cervical back: Normal range of motion  Skin:     General: Skin is warm and dry  Capillary Refill: Capillary refill takes less than 2 seconds  Neurological:      General: No focal deficit present  Mental Status: She is alert  GCS: GCS eye subscore is 4  GCS verbal subscore is 5  GCS motor subscore is 6  Cranial Nerves: Cranial nerves are intact  Sensory: Sensation is intact  Motor: Motor function is intact  Psychiatric:         Mood and Affect: Mood normal          Behavior: Behavior is cooperative           Vital Signs  ED Triage Vitals [06/24/21 1531]   Temperature Pulse Respirations Blood Pressure SpO2   98 4 °F (36 9 °C) 60 16 118/65 98 %      Temp Source Heart Rate Source Patient Position - Orthostatic VS BP Location FiO2 (%)   Oral Monitor Lying Right arm --      Pain Score       --           Vitals:    06/24/21 1531 06/24/21 1848   BP: 118/65 104/59   Pulse: 60 56   Patient Position - Orthostatic VS: Lying Lying         Visual Acuity  Visual Acuity      Most Recent Value   L Pupil Size (mm)  3   R Pupil Size (mm)  3          ED Medications  Medications   acetaminophen (TYLENOL) tablet 650 mg (650 mg Oral Given 6/24/21 1932)       Diagnostic Studies  Results Reviewed     Procedure Component Value Units Date/Time    POCT pregnancy, urine [[id number]]  (Normal) Resulted: 06/24/21 1853    Lab Status: Final result Updated: 06/24/21 1853     EXT PREG TEST UR (Ref: Negative) NEGATIVE     Control VALID    Urine Macroscopic, POC [[id number]] Collected: 06/24/21 1849    Lab Status: Final result Specimen: Urine Updated: 06/24/21 1853     Color, UA Yellow     Clarity, UA Clear     pH, UA 6 5     Leukocytes, UA Negative     Nitrite, UA Negative     Protein, UA Negative mg/dl      Glucose, UA Negative mg/dl      Ketones, UA Negative mg/dl      Urobilinogen, UA 0 2 E U /dl      Bilirubin, UA Negative     Blood, UA Negative     Specific Gravity, UA 1 020    Narrative:      CLINITEK RESULT    TSH [[id number]]  (Normal) Collected: 06/24/21 1612    Lab Status: Final result Specimen: Blood from Arm, Left Updated: 06/24/21 1716     TSH 3RD GENERATON 0 471 uIU/mL     Narrative:      Patients undergoing fluorescein dye angiography may retain small amounts of fluorescein in the body for 48-72 hours post procedure  Samples containing fluorescein can produce falsely depressed TSH values  If the patient had this procedure,a specimen should be resubmitted post fluorescein clearance        Comprehensive metabolic panel [[id number]] Collected: 06/24/21 1612    Lab Status: Final result Specimen: Blood from Arm, Left Updated: 06/24/21 1707     Sodium 141 mmol/L      Potassium 3 9 mmol/L      Chloride 107 mmol/L      CO2 27 mmol/L      ANION GAP 7 mmol/L      BUN 13 mg/dL      Creatinine 0 79 mg/dL      Glucose 79 mg/dL      Calcium 8 3 mg/dL      AST 14 U/L      ALT 24 U/L      Alkaline Phosphatase 62 U/L      Total Protein 7 1 g/dL      Albumin 3 6 g/dL      Total Bilirubin 0 43 mg/dL      eGFR 102 ml/min/1 73sq m     Narrative:      Miguel Angel guidelines for Chronic Kidney Disease (CKD):     Stage 1 with normal or high GFR (GFR > 90 mL/min/1 73 square meters)    Stage 2 Mild CKD (GFR = 60-89 mL/min/1 73 square meters)    Stage 3A Moderate CKD (GFR = 45-59 mL/min/1 73 square meters)    Stage 3B Moderate CKD (GFR = 30-44 mL/min/1 73 square meters)    Stage 4 Severe CKD (GFR = 15-29 mL/min/1 73 square meters)    Stage 5 End Stage CKD (GFR <15 mL/min/1 73 square meters)  Note: GFR calculation is accurate only with a steady state creatinine    Troponin I [[id number]]  (Normal) Collected: 06/24/21 1612    Lab Status: Final result Specimen: Blood from Arm, Left Updated: 06/24/21 1637     Troponin I <0 02 ng/mL     D-Dimer [[id number]]  (Normal) Collected: 06/24/21 1612    Lab Status: Final result Specimen: Blood from Arm, Left Updated: 06/24/21 1633     D-Dimer, Quant <0 27 ug/ml FEU     Protime-INR [[id number]]  (Normal) Collected: 06/24/21 1612    Lab Status: Final result Specimen: Blood from Arm, Left Updated: 06/24/21 1629     Protime 14 1 seconds      INR 1 11    APTT [[id number]]  (Normal) Collected: 06/24/21 1612    Lab Status: Final result Specimen: Blood from Arm, Left Updated: 06/24/21 1629     PTT 29 seconds     CBC and differential [[id number]] Collected: 06/24/21 1612    Lab Status: Final result Specimen: Blood from Arm, Left Updated: 06/24/21 1618     WBC 6 82 Thousand/uL      RBC 4 02 Million/uL      Hemoglobin 12 3 g/dL      Hematocrit 37 6 %      MCV 94 fL      MCH 30 6 pg      MCHC 32 7 g/dL      RDW 12 7 %      MPV 10 5 fL      Platelets 647 Thousands/uL      nRBC 0 /100 WBCs      Neutrophils Relative 59 %      Immat GRANS % 0 %      Lymphocytes Relative 29 %      Monocytes Relative 9 %      Eosinophils Relative 3 %      Basophils Relative 0 %      Neutrophils Absolute 4 05 Thousands/µL      Immature Grans Absolute 0 01 Thousand/uL      Lymphocytes Absolute 1 96 Thousands/µL      Monocytes Absolute 0 60 Thousand/µL      Eosinophils Absolute 0 18 Thousand/µL      Basophils Absolute 0 02 Thousands/µL                  CT head without contrast   Final Result by Jennie Ramirez DO (06/24 1639)      No acute intracranial abnormality  Workstation performed: [id number]         XR chest 2 views   Final Result by Aakash Us MD (06/25 0430)      No acute cardiopulmonary disease                    Workstation performed: [id number]                    Procedures  ECG 12 Lead Documentation Only    Date/Time: 6/24/2021 4:50 PM  Performed by: Rayshawn Mitchell PA-C  Authorized by: Rayshawn Mitchell PA-C     Indications / Diagnosis:  Palpitations  ECG reviewed by me, the ED Provider: yes    Patient location:  ED  Previous ECG:     Previous ECG:  Compared to current    Similarity:  No change  Interpretation:     Interpretation: normal    Rate:     ECG rate:  52    ECG rate assessment: bradycardic    Rhythm:     Rhythm: sinus rhythm      Rhythm comment:  Sinus arrhythmia   Ectopy:     Ectopy: none    QRS:     QRS intervals:  Normal  Conduction:     Conduction: normal    ST segments:     ST segments:  Normal  T waves:     T waves: inverted      Inverted:  III, aVR and V1             ED Course                                           MDM  Number of Diagnoses or Management Options  Chronic headaches  Hx of syncope  Palpitations  Diagnosis management comments: Will check labs, urine, EKG, CXR, CT head  Lab evaluation unremarkable  Urine preg negative  Urine without sign of infection  EKG with sinus bradycardia at a rate of 52 with sinus arrhythmia  No other ST seg changes  Compared to prior - no change  Chest x-ray reviewed by me and interpreted as no active disease  CT head with no acute intracranial abnormality  Discussed all results with patient  Discussed importance of follow up with PCP- call in the morning to schedule ED follow up appointment and review all results from today's visit  It appears patient has an outpatient order for an echo- advised patient to complete this as directed  Discussed follow up with cardiology- given contact information for patient to call in the morning to schedule an appointment  Discussed she may need further evaluation for her symptoms  Discussed strict return precautions if symptoms worsen or new symptoms arise  Patient states understanding and agrees with plan  She is well appearing, non toxic and in NAD at time of discharge            Amount and/or Complexity of Data Reviewed  Clinical lab tests: reviewed and ordered  Tests in the radiology section of CPT®: ordered and reviewed    Patient Progress  Patient progress: stable      Disposition  Final diagnoses:   Palpitations   Hx of syncope   Chronic headaches     Time reflects when diagnosis was documented in both MDM as applicable and the Disposition within this note     Time User Action Codes Description Comment    6/24/2021  7:57 PM Andrew Saúl Estrada [R00 2] Palpitations     6/24/2021  7:59 PM Mark Kalen Add [Z87 898] Hx of syncope     6/24/2021  7:59 PM Mark Kalen Add [R51 9,  G89 29] Chronic headaches       ED Disposition     ED Disposition Condition Date/Time Comment    Discharge Stable Thu Jun 24, 2021  7:57 PM Nury Finical discharge to home/self care  Follow-up Information     Follow up With Specialties Details Why [address] MD Family Medicine Call in 1 day inform the office you were seen in the ED and discuss all results from today's visit  Discuss the outpatient Echo that was ordered and when/where this should be performed [address]       [address] [facility] Cardiology Schedule an appointment as soon as possible for a visit in 1 day Call the office tomorrow morning to schedule an appointment  Inform the office you were seen in the ED and discuss your symptoms [facility] [phone]  [address] [place], [place], [address]    [facility] Emergency Department Emergency Medicine  If symptoms worsen [facility] [phone]  [facility] Emergency Department, [address] [facility] , [place], [place], [address]    Neurology [facility] Neurology Schedule an appointment as soon as possible for a visit  As needed to discuss your headaches [address] [place], [place] [id number] [address] [facility]  [phone] Neurology [facility], [address] [place] [address], [address] [place] [address]          There are no discharge medications for this patient  No discharge procedures on file      PDMP Review     None          ED Provider  Electronically Signed by           Kassidy Infante PA-C  06/25/21 3448

## 2021-11-27 ENCOUNTER — APPOINTMENT (EMERGENCY)
Dept: CT IMAGING | Facility: HOSPITAL | Age: 31
End: 2021-11-27
Payer: COMMERCIAL

## 2021-11-27 ENCOUNTER — HOSPITAL ENCOUNTER (EMERGENCY)
Facility: HOSPITAL | Age: 31
Discharge: HOME | End: 2021-11-28
Attending: EMERGENCY MEDICINE
Payer: COMMERCIAL

## 2021-11-27 VITALS
BODY MASS INDEX: 37.47 KG/M2 | TEMPERATURE: 98 F | HEIGHT: 65 IN | DIASTOLIC BLOOD PRESSURE: 58 MMHG | WEIGHT: 224.87 LBS | HEART RATE: 65 BPM | RESPIRATION RATE: 16 BRPM | OXYGEN SATURATION: 100 % | SYSTOLIC BLOOD PRESSURE: 101 MMHG

## 2021-11-27 DIAGNOSIS — E87.6 HYPOKALEMIA: ICD-10-CM

## 2021-11-27 DIAGNOSIS — S00.511A ABRASION OF LIP, INITIAL ENCOUNTER: ICD-10-CM

## 2021-11-27 DIAGNOSIS — R25.1 EPISODE OF SHAKING: ICD-10-CM
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
## 2021-11-27 LAB
ALBUMIN SERPL BCP-MCNC: 3.4 G/DL (ref 3.5–5)
ALP SERPL-CCNC: 59 U/L (ref 46–116)
ALT SERPL W P-5'-P-CCNC: 23 U/L (ref 12–78)
ANION GAP SERPL CALCULATED.3IONS-SCNC: 11 MMOL/L (ref 4–13)
AST SERPL W P-5'-P-CCNC: 14 U/L (ref 5–45)
ATRIAL RATE: 62 BPM
BACTERIA UR QL AUTO: ABNORMAL /HPF
BASOPHILS # BLD AUTO: 0.01 THOUSANDS/ΜL (ref 0–0.1)
BASOPHILS NFR BLD AUTO: 0 % (ref 0–1)
BILIRUB SERPL-MCNC: 0.1 MG/DL (ref 0.2–1)
BILIRUB UR QL STRIP: NEGATIVE
BUN SERPL-MCNC: 8 MG/DL (ref 5–25)
CALCIUM ALBUM COR SERPL-MCNC: 8.2 MG/DL (ref 8.3–10.1)
CALCIUM SERPL-MCNC: 7.7 MG/DL (ref 8.3–10.1)
CHLORIDE SERPL-SCNC: 106 MMOL/L (ref 100–108)
CLARITY UR: CLEAR
CO2 SERPL-SCNC: 27 MMOL/L (ref 21–32)
COLOR UR: YELLOW
CREAT SERPL-MCNC: 0.77 MG/DL (ref 0.6–1.3)
EOSINOPHIL # BLD AUTO: 0.11 THOUSAND/ΜL (ref 0–0.61)
EOSINOPHIL NFR BLD AUTO: 3 % (ref 0–6)
ERYTHROCYTE [DISTWIDTH] IN BLOOD BY AUTOMATED COUNT: 12.4 % (ref 11.6–15.1)
EXT PREG TEST URINE: NEGATIVE
EXT. CONTROL ED NAV: NORMAL
GFR SERPL CREATININE-BSD FRML MDRD: 105 ML/MIN/1.73SQ M
GLUCOSE SERPL-MCNC: 104 MG/DL (ref 65–140)
GLUCOSE UR STRIP-MCNC: NEGATIVE MG/DL
HCT VFR BLD AUTO: 38.6 % (ref 34.8–46.1)
HGB BLD-MCNC: 12.7 G/DL (ref 11.5–15.4)
HGB UR QL STRIP.AUTO: NEGATIVE
IMM GRANULOCYTES # BLD AUTO: 0 THOUSAND/UL (ref 0–0.2)
IMM GRANULOCYTES NFR BLD AUTO: 0 % (ref 0–2)
KETONES UR STRIP-MCNC: ABNORMAL MG/DL
LEUKOCYTE ESTERASE UR QL STRIP: NEGATIVE
LYMPHOCYTES # BLD AUTO: 1.95 THOUSANDS/ΜL (ref 0.6–4.47)
LYMPHOCYTES NFR BLD AUTO: 50 % (ref 14–44)
MAGNESIUM SERPL-MCNC: 1.7 MG/DL (ref 1.6–2.6)
MCH RBC QN AUTO: 31.2 PG (ref 26.8–34.3)
MCHC RBC AUTO-ENTMCNC: 32.9 G/DL (ref 31.4–37.4)
MCV RBC AUTO: 95 FL (ref 82–98)
MONOCYTES # BLD AUTO: 0.28 THOUSAND/ΜL (ref 0.17–1.22)
MONOCYTES NFR BLD AUTO: 7 % (ref 4–12)
MUCOUS THREADS UR QL AUTO: ABNORMAL
NEUTROPHILS # BLD AUTO: 1.6 THOUSANDS/ΜL (ref 1.85–7.62)
NEUTS SEG NFR BLD AUTO: 40 % (ref 43–75)
NITRITE UR QL STRIP: NEGATIVE
NON-SQ EPI CELLS URNS QL MICRO: ABNORMAL /HPF
NRBC BLD AUTO-RTO: 0 /100 WBCS
P AXIS: 57 DEGREES
PH UR STRIP.AUTO: 6 [PH] (ref 4.5–8)
PLATELET # BLD AUTO: 164 THOUSANDS/UL (ref 149–390)
PMV BLD AUTO: 11.3 FL (ref 8.9–12.7)
POTASSIUM SERPL-SCNC: 3 MMOL/L (ref 3.5–5.3)
PR INTERVAL: 168 MS
PROT SERPL-MCNC: 6.8 G/DL (ref 6.4–8.2)
PROT UR STRIP-MCNC: ABNORMAL MG/DL
QRS AXIS: 62 DEGREES
QRSD INTERVAL: 92 MS
QT INTERVAL: 404 MS
QTC INTERVAL: 410 MS
RBC # BLD AUTO: 4.07 MILLION/UL (ref 3.81–5.12)
RBC #/AREA URNS AUTO: ABNORMAL /HPF
SODIUM SERPL-SCNC: 144 MMOL/L (ref 136–145)
SP GR UR STRIP.AUTO: >=1.03 (ref 1–1.03)
T WAVE AXIS: 58 DEGREES
TSH SERPL DL<=0.05 MIU/L-ACNC: 1.2 UIU/ML (ref 0.36–3.74)
UROBILINOGEN UR QL STRIP.AUTO: 0.2 E.U./DL
VENTRICULAR RATE: 62 BPM
WBC # BLD AUTO: 3.95 THOUSAND/UL (ref 4.31–10.16)
WBC #/AREA URNS AUTO: ABNORMAL /HPF

## 2021-11-27 PROCEDURE — 93010 ELECTROCARDIOGRAM REPORT: CPT | Performed by: INTERNAL MEDICINE

## 2021-11-27 PROCEDURE — 96360 HYDRATION IV INFUSION INIT: CPT

## 2021-11-27 PROCEDURE — 81001 URINALYSIS AUTO W/SCOPE: CPT

## 2021-11-27 PROCEDURE — 83735 ASSAY OF MAGNESIUM: CPT | Performed by: PHYSICIAN ASSISTANT

## 2021-11-27 PROCEDURE — 81025 URINE PREGNANCY TEST: CPT | Performed by: PHYSICIAN ASSISTANT

## 2021-11-27 PROCEDURE — 80053 COMPREHEN METABOLIC PANEL: CPT | Performed by: PHYSICIAN ASSISTANT

## 2021-11-27 PROCEDURE — 36415 COLL VENOUS BLD VENIPUNCTURE: CPT | Performed by: PHYSICIAN ASSISTANT

## 2021-11-27 PROCEDURE — 70450 CT HEAD/BRAIN W/O DYE: CPT

## 2021-11-27 PROCEDURE — 96361 HYDRATE IV INFUSION ADD-ON: CPT

## 2021-11-27 PROCEDURE — 84443 ASSAY THYROID STIM HORMONE: CPT | Performed by: PHYSICIAN ASSISTANT

## 2021-11-27 PROCEDURE — 99285 EMERGENCY DEPT VISIT HI MDM: CPT

## 2021-11-27 PROCEDURE — 93005 ELECTROCARDIOGRAM TRACING: CPT

## 2021-11-27 PROCEDURE — 85025 COMPLETE CBC W/AUTO DIFF WBC: CPT | Performed by: PHYSICIAN ASSISTANT

## 2021-11-27 RX ADMIN — SODIUM CHLORIDE 1000 ML: 0.9 INJECTION, SOLUTION INTRAVENOUS at 22:09

## 2021-11-28 PROCEDURE — 99285 EMERGENCY DEPT VISIT HI MDM: CPT | Performed by: PHYSICIAN ASSISTANT

## 2021-11-28 RX ORDER — POTASSIUM CHLORIDE 20 MEQ/1
20 TABLET, EXTENDED RELEASE ORAL 2 TIMES DAILY
Qty: 10 TABLET | Refills: 0 | Status: SHIPPED | OUTPATIENT
Start: 2021-11-28 | End: 2022-01-03 | Stop reason: ALTCHOICE

## 2021-12-03 ENCOUNTER — OFFICE VISIT (OUTPATIENT)
Dept: FAMILY MEDICINE CLINIC | Facility: CLINIC | Age: 31
End: 2021-12-03

## 2021-12-03 VITALS
WEIGHT: 209 LBS | TEMPERATURE: 97.1 F | DIASTOLIC BLOOD PRESSURE: 60 MMHG | HEART RATE: 67 BPM | SYSTOLIC BLOOD PRESSURE: 102 MMHG | BODY MASS INDEX: 34.82 KG/M2 | HEIGHT: 65 IN | OXYGEN SATURATION: 98 % | RESPIRATION RATE: 16 BRPM

## 2021-12-03 DIAGNOSIS — G44.219 EPISODIC TENSION-TYPE HEADACHE, NOT INTRACTABLE: ICD-10-CM

## 2021-12-03 DIAGNOSIS — F41.9 ANXIETY: ICD-10-CM

## 2021-12-03 DIAGNOSIS — E87.6 HYPOKALEMIA: ICD-10-CM

## 2021-12-03 DIAGNOSIS — R00.2 PALPITATIONS: Primary | ICD-10-CM

## 2021-12-03 DIAGNOSIS — R55 SYNCOPE, UNSPECIFIED SYNCOPE TYPE: ICD-10-CM

## 2021-12-03 PROBLEM — N80.3 ENDOMETRIOSIS OF THE CUL-DE-SAC: Status: RESOLVED | Noted: 2021-05-04 | Resolved: 2021-12-03

## 2021-12-03 PROBLEM — R10.9 ABDOMINAL PAIN: Status: RESOLVED | Noted: 2017-07-12 | Resolved: 2021-12-03

## 2021-12-03 PROBLEM — B37.9 YEAST INFECTION: Status: RESOLVED | Noted: 2017-08-23 | Resolved: 2021-12-03

## 2021-12-03 PROBLEM — IMO0002 ENDOMETRIOSIS OF THE CUL-DE-SAC: Status: RESOLVED | Noted: 2021-05-04 | Resolved: 2021-12-03

## 2021-12-03 PROCEDURE — 99214 OFFICE O/P EST MOD 30 MIN: CPT

## 2021-12-03 RX ORDER — ESCITALOPRAM OXALATE 10 MG/1
10 TABLET ORAL DAILY
Qty: 45 TABLET | Refills: 0 | Status: SHIPPED | OUTPATIENT
Start: 2021-12-03 | End: 2022-01-03 | Stop reason: ALTCHOICE

## 2021-12-07 ENCOUNTER — TELEPHONE (OUTPATIENT)
Dept: FAMILY MEDICINE CLINIC | Facility: CLINIC | Age: 31
End: 2021-12-07

## 2021-12-22 ENCOUNTER — APPOINTMENT (OUTPATIENT)
Dept: LAB | Facility: HOSPITAL | Age: 31
End: 2021-12-22
Payer: COMMERCIAL

## 2021-12-22 ENCOUNTER — HOSPITAL ENCOUNTER (OUTPATIENT)
Dept: NON INVASIVE DIAGNOSTICS | Facility: HOSPITAL | Age: 31
Discharge: HOME | End: 2021-12-22
Payer: COMMERCIAL

## 2021-12-22 VITALS
WEIGHT: 209 LBS | HEART RATE: 54 BPM | BODY MASS INDEX: 34.82 KG/M2 | SYSTOLIC BLOOD PRESSURE: 116 MMHG | DIASTOLIC BLOOD PRESSURE: 70 MMHG | HEIGHT: 65 IN

## 2021-12-22 DIAGNOSIS — R42 DIZZINESS: ICD-10-CM

## 2021-12-22 DIAGNOSIS — E87.6 HYPOKALEMIA: ICD-10-CM

## 2021-12-22 DIAGNOSIS — R00.1 SINUS BRADYCARDIA BY ELECTROCARDIOGRAPHY: ICD-10-CM

## 2021-12-22 LAB
ALBUMIN SERPL BCP-MCNC: 3.9 G/DL (ref 3.5–5)
ALP SERPL-CCNC: 58 U/L (ref 46–116)
ALT SERPL W P-5'-P-CCNC: 26 U/L (ref 12–78)
ANION GAP SERPL CALCULATED.3IONS-SCNC: 9 MMOL/L (ref 4–13)
AORTIC ROOT: 2.5 CM
APICAL FOUR CHAMBER EJECTION FRACTION: 57 %
ASCENDING AORTA: 2.6 CM
AST SERPL W P-5'-P-CCNC: 13 U/L (ref 5–45)
BASOPHILS # BLD AUTO: 0.02 THOUSANDS/ΜL (ref 0–0.1)
BASOPHILS NFR BLD AUTO: 0 % (ref 0–1)
BILIRUB SERPL-MCNC: 0.66 MG/DL (ref 0.2–1)
BUN SERPL-MCNC: 9 MG/DL (ref 5–25)
CALCIUM SERPL-MCNC: 8.7 MG/DL (ref 8.3–10.1)
CHLORIDE SERPL-SCNC: 107 MMOL/L (ref 100–108)
CO2 SERPL-SCNC: 27 MMOL/L (ref 21–32)
CREAT SERPL-MCNC: 0.7 MG/DL (ref 0.6–1.3)
E WAVE DECELERATION TIME: 235 MS
EOSINOPHIL # BLD AUTO: 0.19 THOUSAND/ΜL (ref 0–0.61)
EOSINOPHIL NFR BLD AUTO: 4 % (ref 0–6)
ERYTHROCYTE [DISTWIDTH] IN BLOOD BY AUTOMATED COUNT: 12.7 % (ref 11.6–15.1)
FRACTIONAL SHORTENING: 29 % (ref 28–44)
GFR SERPL CREATININE-BSD FRML MDRD: 117 ML/MIN/1.73SQ M
GLUCOSE SERPL-MCNC: 83 MG/DL (ref 65–140)
HCT VFR BLD AUTO: 40 % (ref 34.8–46.1)
HGB BLD-MCNC: 13.1 G/DL (ref 11.5–15.4)
IMM GRANULOCYTES # BLD AUTO: 0.01 THOUSAND/UL (ref 0–0.2)
IMM GRANULOCYTES NFR BLD AUTO: 0 % (ref 0–2)
INTERVENTRICULAR SEPTUM IN DIASTOLE (PARASTERNAL SHORT AXIS VIEW): 0.5 CM
LAAS-AP2: 17.8 CM2
LAAS-AP4: 15.3 CM2
LEFT INTERNAL DIMENSION IN SYSTOLE: 3.7 CM (ref 2.1–4)
LEFT VENTRICULAR INTERNAL DIMENSION IN DIASTOLE: 5.2 CM (ref 5.49–8.18)
LEFT VENTRICULAR POSTERIOR WALL IN END DIASTOLE: 0.5 CM
LEFT VENTRICULAR STROKE VOLUME: 71 ML
LYMPHOCYTES # BLD AUTO: 1.66 THOUSANDS/ΜL (ref 0.6–4.47)
LYMPHOCYTES NFR BLD AUTO: 30 % (ref 14–44)
MCH RBC QN AUTO: 30.3 PG (ref 26.8–34.3)
MCHC RBC AUTO-ENTMCNC: 32.8 G/DL (ref 31.4–37.4)
MCV RBC AUTO: 92 FL (ref 82–98)
MONOCYTES # BLD AUTO: 0.48 THOUSAND/ΜL (ref 0.17–1.22)
MONOCYTES NFR BLD AUTO: 9 % (ref 4–12)
MV E'TISSUE VEL-LAT: 17 CM/S
MV E'TISSUE VEL-SEP: 12 CM/S
MV PEAK A VEL: 0.41 M/S
MV PEAK E VEL: 83 CM/S
MV STENOSIS PRESSURE HALF TIME: 0 MS
NEUTROPHILS # BLD AUTO: 3.1 THOUSANDS/ΜL (ref 1.85–7.62)
NEUTS SEG NFR BLD AUTO: 57 % (ref 43–75)
NRBC BLD AUTO-RTO: 0 /100 WBCS
PLATELET # BLD AUTO: 223 THOUSANDS/UL (ref 149–390)
PMV BLD AUTO: 10.5 FL (ref 8.9–12.7)
POTASSIUM SERPL-SCNC: 3.9 MMOL/L (ref 3.5–5.3)
PROT SERPL-MCNC: 7.2 G/DL (ref 6.4–8.2)
RBC # BLD AUTO: 4.33 MILLION/UL (ref 3.81–5.12)
RIGHT ATRIAL 2D VOLUME: 37 ML
RIGHT ATRIUM AREA SYSTOLE A4C: 14.4 CM2
RIGHT VENTRICLE ID DIMENSION: 3.9 CM
SL CV PED ECHO LEFT VENTRICLE DIASTOLIC VOLUME (MOD BIPLANE) 2D: 131 ML
SL CV PED ECHO LEFT VENTRICLE SYSTOLIC VOLUME (MOD BIPLANE) 2D: 59 ML
SODIUM SERPL-SCNC: 143 MMOL/L (ref 136–145)
TRICUSPID VALVE PEAK REGURGITATION VELOCITY: 2.34 M/S
TRICUSPID VALVE S': 53 CM/S
TSH SERPL DL<=0.05 MIU/L-ACNC: 0.86 UIU/ML (ref 0.36–3.74)
TV PEAK GRADIENT: 22 MMHG
WBC # BLD AUTO: 5.46 THOUSAND/UL (ref 4.31–10.16)
Z-SCORE OF LEFT VENTRICULAR DIMENSION IN END SYSTOLE: -2.53

## 2021-12-22 PROCEDURE — 84443 ASSAY THYROID STIM HORMONE: CPT

## 2021-12-22 PROCEDURE — 85025 COMPLETE CBC W/AUTO DIFF WBC: CPT

## 2021-12-22 PROCEDURE — 93306 TTE W/DOPPLER COMPLETE: CPT | Performed by: INTERNAL MEDICINE

## 2021-12-22 PROCEDURE — 80053 COMPREHEN METABOLIC PANEL: CPT

## 2021-12-22 PROCEDURE — 36415 COLL VENOUS BLD VENIPUNCTURE: CPT

## 2021-12-22 PROCEDURE — 93306 TTE W/DOPPLER COMPLETE: CPT

## 2021-12-23 ENCOUNTER — HOSPITAL ENCOUNTER (OUTPATIENT)
Dept: NON INVASIVE DIAGNOSTICS | Facility: HOSPITAL | Age: 31
Discharge: HOME | End: 2021-12-23
Payer: COMMERCIAL

## 2021-12-23 DIAGNOSIS — R00.2 PALPITATIONS: ICD-10-CM

## 2021-12-23 PROCEDURE — 93225 XTRNL ECG REC<48 HRS REC: CPT

## 2021-12-23 PROCEDURE — 93226 XTRNL ECG REC<48 HR SCAN A/R: CPT

## 2021-12-31 PROCEDURE — 93227 XTRNL ECG REC<48 HR R&I: CPT | Performed by: INTERNAL MEDICINE

## 2022-01-03 ENCOUNTER — OFFICE VISIT (OUTPATIENT)
Dept: FAMILY MEDICINE CLINIC | Facility: CLINIC | Age: 32
End: 2022-01-03

## 2022-01-03 ENCOUNTER — TELEPHONE (OUTPATIENT)
Dept: FAMILY MEDICINE CLINIC | Facility: CLINIC | Age: 32
End: 2022-01-03

## 2022-01-03 VITALS
HEIGHT: 65 IN | BODY MASS INDEX: 34.49 KG/M2 | SYSTOLIC BLOOD PRESSURE: 100 MMHG | DIASTOLIC BLOOD PRESSURE: 64 MMHG | OXYGEN SATURATION: 99 % | HEART RATE: 66 BPM | WEIGHT: 207 LBS | TEMPERATURE: 97.6 F | RESPIRATION RATE: 16 BRPM

## 2022-01-03 DIAGNOSIS — F41.9 ANXIETY: ICD-10-CM

## 2022-01-03 DIAGNOSIS — R55 SYNCOPE, UNSPECIFIED SYNCOPE TYPE: Primary | ICD-10-CM

## 2022-01-03 DIAGNOSIS — Z13.39 ADHD (ATTENTION DEFICIT HYPERACTIVITY DISORDER) EVALUATION: ICD-10-CM

## 2022-01-03 DIAGNOSIS — F41.1 GENERALIZED ANXIETY DISORDER: ICD-10-CM

## 2022-01-03 PROBLEM — Z86.16 HISTORY OF COVID-19: Status: ACTIVE | Noted: 2022-01-03

## 2022-01-03 PROBLEM — E87.6 HYPOKALEMIA: Status: RESOLVED | Noted: 2021-12-03 | Resolved: 2022-01-03

## 2022-01-03 PROBLEM — Z86.16 HISTORY OF COVID-19: Status: RESOLVED | Noted: 2022-01-03 | Resolved: 2022-01-03

## 2022-01-03 PROCEDURE — 99214 OFFICE O/P EST MOD 30 MIN: CPT

## 2022-01-03 RX ORDER — QUETIAPINE FUMARATE 100 MG/1
100 TABLET, FILM COATED ORAL
Qty: 30 TABLET | Refills: 0 | Status: SHIPPED | OUTPATIENT
Start: 2022-01-03 | End: 2022-01-03

## 2022-01-03 RX ORDER — QUETIAPINE FUMARATE 50 MG/1
50 TABLET, FILM COATED ORAL
Qty: 40 TABLET | Refills: 0 | Status: SHIPPED | OUTPATIENT
Start: 2022-01-03 | End: 2022-02-07

## 2022-01-03 RX ORDER — HYDROXYZINE HYDROCHLORIDE 25 MG/1
25 TABLET, FILM COATED ORAL EVERY 6 HOURS PRN
Qty: 30 TABLET | Refills: 1 | Status: SHIPPED | OUTPATIENT
Start: 2022-01-03

## 2022-01-03 NOTE — PATIENT INSTRUCTIONS
Quetiapine (By mouth)   Quetiapine Fumarate (kyn-BWE-m-peen FUE-ma-rate)  Treats schizophrenia, bipolar disorder, or depression  Brand Name(s): SEROquel, SEROquel XR, Seroquel XR 14-Day Sample Kit   There may be other brand names for this medicine  When This Medicine Should Not Be Used: This medicine is not right for everyone  Do not use if you had an allergic reaction to quetiapine  How to Use This Medicine:   Tablet, Long Acting Tablet  · Take your medicine as directed  Your dose may need to be changed several times to find what works best for you  You need to start with a low dose, even if you have used this medicine before  · Your doctor may tell you to take the medicine at bedtime, because quetiapine can make you sleepy  · Extended-release tablets: Take this medicine either without food or with a light snack (about 300 calories)  · Swallow the extended-release tablet whole  Do not crush, break, or chew it  · This medicine should come with a Medication Guide  Ask your pharmacist for a copy if you do not have one  · Missed dose: Take a dose as soon as you remember  If it is almost time for your next dose, wait until then and take a regular dose  Do not take extra medicine to make up for a missed dose  · Store the medicine in a closed container at room temperature, away from heat, moisture, and direct light  Drugs and Foods to Avoid:   Ask your doctor or pharmacist before using any other medicine, including over-the-counter medicines, vitamins, and herbal products  · Some medicines can affect how quetiapine works  Tell your doctor if you are using any of the following:  ? Avasimibe, levodopa, methadone, nefazodone, rifampin, Amber's wort  ? Blood pressure medicine  ? Medicine for heart rhythm problems (including amiodarone, procainamide, quinidine, sotalol)  ? Medicine for seizures (including carbamazepine, phenobarbital, phenytoin)  ?  Medicine to treat an infection (including gatifloxacin, itraconazole, ketoconazole, moxifloxacin, pentamidine)  ? Medicine to treat HIV/AIDS (including indinavir, ritonavir)  ? Other medicine to treat mental health problems (including chlorpromazine, thioridazine, ziprasidone)  · Tell your doctor if you use anything else that makes you sleepy  Some examples are allergy medicine, narcotic pain medicine, and alcohol  · Do not drink alcohol while you are using this medicine  Warnings While Using This Medicine:   · Tell your doctor if you are pregnant or breastfeeding, or if you have kidney disease, liver disease, breast cancer, dementia, diabetes, thyroid problems, eye or vision problems (including increased pressure in the eye), an enlarged prostate, trouble urinating, constipation, or a history of seizures, or neuroleptic malignant syndrome (NMS)  Tell your doctor if you have any kind of blood vessel or heart problems, including low or high blood pressure, heart failure, heart rhythm problems (including QT prolongation, slow heartbeat), high cholesterol, or a history of heart attack or stroke  · This medicine may cause the following problems:  ? Neuroleptic malignant syndrome (a nerve and muscle problem)  ? High blood sugar, cholesterol, or triglyceride levels  ? Tardive dyskinesia (a muscle problem that may become permanent)  ? Changes in blood pressure, especially in children  ? Eye or vision problems (including cataracts)  ? Heart rhythm problems  ? Increased risk of seizures  ? Anticholinergic effects, when used with other medicines  · This medicine may cause depression or thoughts of suicide  Make sure family members know about this  Always tell your doctor about any behavior changes, depression, intense feelings, or thoughts of hurting yourself or others  · This medicine may make you dizzy or drowsy, or may cause trouble with thinking or controlling body movements, which may lead to falls, fractures, or other injuries   Do not drive or do anything else that could be dangerous until you know how this medicine affects you  You may also feel lightheaded when getting up suddenly from a lying or sitting position, so stand up slowly  · This medicine may make you bleed, bruise, or get infections more easily  Take precautions to prevent illness and injury  Wash your hands often  · This medicine may make it more difficult for your body to cool down  Be careful not to become overheated during exercise or hot weather, because you could have a heat stroke  · Do not stop using this medicine suddenly  Your doctor will need to slowly decrease your dose before you stop it completely  · Your doctor will do lab tests at regular visits to check on the effects of this medicine  Keep all appointments  You may also need to have your eyes tested on a regular basis  · Tell any doctor or dentist who treats you that you are using this medicine  This medicine may affect certain medical test results  · Keep all medicine out of the reach of children  Never share your medicine with anyone    Possible Side Effects While Using This Medicine:   Call your doctor right away if you notice any of these side effects:  · Allergic reaction: Itching or hives, swelling in your face or hands, swelling or tingling in your mouth or throat, chest tightness, trouble breathing  · Changes in mood or behavior, agitation, anxiety, restlessness, thoughts of hurting yourself or others  · Constant muscle movement that you cannot control (often in your lips, tongue, jaw, arms, or legs)  · Fast, slow, pounding, or uneven heartbeat  · Fever, chills, cough, sore throat, body aches  · Fever, sweating, confusion, muscle stiffness  · Increase in how much or how often you urinate, increased thirst, increased hunger, weakness  · Lightheadedness, dizziness, or fainting, clumsiness  · Seizures  · Vision changes  If you notice these less serious side effects, talk with your doctor:   · Constipation, vomiting, nausea, dry mouth  · Headache  · Sleepiness  · Trouble swallowing  · Weight gain  If you notice other side effects that you think are caused by this medicine, tell your doctor  Call your doctor for medical advice about side effects  You may report side effects to FDA at 3-791-FDA-9554    © Copyright [employer] 2021 Information is for End User's use only and may not be sold, redistributed or otherwise used for commercial purposes  The above information is an  only  It is not intended as medical advice for individual conditions or treatments  Talk to your doctor, nurse or pharmacist before following any medical regimen to see if it is safe and effective for you      Headache Diary      Date Time Duration Intensity (1-10) Triggers Preceding symptoms Treatment used Response

## 2022-01-03 NOTE — ASSESSMENT & PLAN NOTE
D/c lexapro  -start seroquel 50 mg daily hs  -will refer to psych d/t hx of bipolar depression per pt   For accurate dx and optimum management  F/u in 4 weeks

## 2022-01-03 NOTE — PROGRESS NOTES
Assessment/Plan:    Generalized anxiety disorder  D/c lexapro  -start seroquel 50 mg daily hs  -will refer to psych d/t hx of bipolar depression per pt  For accurate dx and optimum management  F/u in 4 weeks    Syncope  Awaiting MRI auth  Neuro appt in Feb  Card w/u negative thus far  -referred to cards for recommendation on further testing  Diagnoses and all orders for this visit:    Syncope, unspecified syncope type  -     Ambulatory referral to Cardiology; Future    ADHD (attention deficit hyperactivity disorder) evaluation  -     Ambulatory referral to Psychiatry; Future    Anxiety  -     hydrOXYzine HCL (ATARAX) 25 mg tablet; Take 1 tablet (25 mg total) by mouth every 6 (six) hours as needed for anxiety  -     Discontinue: QUEtiapine (SEROquel) 100 mg tablet; Take 1 tablet (100 mg total) by mouth daily at bedtime    Generalized anxiety disorder  -     QUEtiapine (SEROquel) 50 mg tablet; Take 1 tablet (50 mg total) by mouth daily at bedtime  -     Ambulatory referral to social work care management program; Future    ? Subjective:      Patient ID: Elia Fam is a 36 y o  female  Elia Fam is a 36 y o  female who presents today for a follow-up on syncopal episodes & near syncope epsiodees  Work up so far has included holter monitoring, ekg, head CT, echo, and blood work, which has been negative thus far  Waiting on MRI approval  Denies any additional syncopal episodes  She was started on lexapro at her last appt for anxiety  She reports that she took it for a few weeks and experiences depressive symptoms so she discontinued It and her depressive symptoms subsided  She denies depressive symptoms thus far  She also reports a hx of ADHD and taking medication as a child but she does not recall the name of the medication          The following portions of the patient's history were reviewed and updated as appropriate: allergies, current medications, past family history, past medical history, past social history, past surgical history and problem list   A chart review was performed and previous primary care visit notes were reviewed  All applicable imaging studies were reviewed and images were reviewed personally  All applicable laboratory studies were reviewed personally  Care everywhere review was performed if  available and all pertinent notes were reviewed  Review of Systems   Constitutional: Negative for chills and fever  HENT: Negative for ear pain and sore throat  Eyes: Negative for pain and visual disturbance  Respiratory: Negative for cough and shortness of breath  Cardiovascular: Negative for chest pain and palpitations  Gastrointestinal: Negative for abdominal pain and vomiting  Genitourinary: Negative for dysuria and hematuria  Musculoskeletal: Negative for arthralgias and back pain  Skin: Negative for color change and rash  Neurological: Positive for headaches  Negative for seizures  Psychiatric/Behavioral: The patient is nervous/anxious  All other systems reviewed and are negative  Objective:      /64 (BP Location: Left arm, Patient Position: Sitting, Cuff Size: Large)   Pulse 66   Temp 97 6 °F (36 4 °C) (Temporal)   Resp 16   Ht 5' 5" (1 651 m)   Wt 93 9 kg (207 lb)   SpO2 99%   Breastfeeding No   BMI 34 45 kg/m²          Physical Exam  Vitals and nursing note reviewed  Constitutional:       Appearance: She is obese  HENT:      Head: Normocephalic and atraumatic  Right Ear: External ear normal       Left Ear: External ear normal       Nose: Nose normal    Eyes:      Extraocular Movements: Extraocular movements intact  Conjunctiva/sclera: Conjunctivae normal       Pupils: Pupils are equal, round, and reactive to light  Cardiovascular:      Rate and Rhythm: Normal rate and regular rhythm  Pulses: Normal pulses  Heart sounds: Normal heart sounds     Pulmonary:      Effort: Pulmonary effort is normal  Breath sounds: Normal breath sounds  Abdominal:      General: Bowel sounds are normal       Palpations: Abdomen is soft  Tenderness: There is no abdominal tenderness  Musculoskeletal:         General: Normal range of motion  Cervical back: Normal range of motion  Skin:     General: Skin is warm and dry  Neurological:      General: No focal deficit present  Mental Status: She is alert and oriented to person, place, and time  Mental status is at baseline  Psychiatric:         Mood and Affect: Mood normal          Behavior: Behavior normal          Thought Content:  Thought content normal

## 2022-01-03 NOTE — TELEPHONE ENCOUNTER
----- Message from [address] [place] [address] sent at 1/3/2022  9:39 AM EST -----  STD form placed in fax bin

## 2022-01-03 NOTE — ASSESSMENT & PLAN NOTE
Awaiting MRI auth  Neuro appt in Feb  Card w/u negative thus far  -referred to cards for recommendation on further testing

## 2022-01-10 ENCOUNTER — PATIENT OUTREACH (OUTPATIENT)
Dept: FAMILY MEDICINE CLINIC | Facility: CLINIC | Age: 32
End: 2022-01-10

## 2022-01-10 NOTE — PROGRESS NOTES
KATLIN ONEAL received referral from provider regarding Pt needs assistance with MH resources  KATLIN ONEAL did chart review and called Pt  KATLIN ONEAL introduced herself and explained Pt the purpose of the call  KATLIN ONEAL asked Pt if she still in needs of mental health support  Pt expressed that she has the list of MH and does not have any social needs at this time  KATLIN ONEAL is closing case since Pt expressed that she does not have social needs at this time  KATLIN ONEAL is remain available for further assistance as needed

## 2022-01-18 ENCOUNTER — TELEPHONE (OUTPATIENT)
Dept: PSYCHIATRY | Facility: CLINIC | Age: 32
End: 2022-01-18

## 2022-02-01 ENCOUNTER — OFFICE VISIT (OUTPATIENT)
Dept: FAMILY MEDICINE CLINIC | Facility: CLINIC | Age: 32
End: 2022-02-01

## 2022-02-01 VITALS
RESPIRATION RATE: 16 BRPM | HEIGHT: 65 IN | SYSTOLIC BLOOD PRESSURE: 108 MMHG | TEMPERATURE: 97.4 F | OXYGEN SATURATION: 99 % | WEIGHT: 209 LBS | DIASTOLIC BLOOD PRESSURE: 70 MMHG | BODY MASS INDEX: 34.82 KG/M2 | HEART RATE: 51 BPM

## 2022-02-01 DIAGNOSIS — F41.9 ANXIETY: ICD-10-CM

## 2022-02-01 DIAGNOSIS — R42 VERTIGO: Primary | ICD-10-CM

## 2022-02-01 PROBLEM — E66.811 OBESITY (BMI 30.0-34.9): Status: ACTIVE | Noted: 2022-02-01

## 2022-02-01 PROBLEM — E66.9 OBESITY (BMI 30.0-34.9): Status: ACTIVE | Noted: 2022-02-01

## 2022-02-01 PROCEDURE — 99214 OFFICE O/P EST MOD 30 MIN: CPT

## 2022-02-01 RX ORDER — MECLIZINE HYDROCHLORIDE 25 MG/1
25 TABLET ORAL EVERY 8 HOURS SCHEDULED
Qty: 30 TABLET | Refills: 0 | Status: SHIPPED | OUTPATIENT
Start: 2022-02-01

## 2022-02-01 NOTE — PATIENT INSTRUCTIONS
Vertigo   WHAT YOU NEED TO KNOW:   Vertigo is a condition that causes you to feel dizzy  You may feel that you or everything around you is moving or spinning  You may also feel like you are being pulled down or toward your side  DISCHARGE INSTRUCTIONS:   Return to the emergency department if:   · You have a headache and a stiff neck  · You have shaking chills and a fever  · You vomit over and over with no relief  · You have blood, pus, or fluid coming out of your ears  · You are confused  Contact your healthcare provider if:   · Your symptoms do not get better with treatment  · You have questions about your condition or care  Medicines:   · Medicine  may be given to help relieve your symptoms  · Take your medicine as directed  Contact your healthcare provider if you think your medicine is not helping or if you have side effects  Tell him or her if you are allergic to any medicine  Keep a list of the medicines, vitamins, and herbs you take  Include the amounts, and when and why you take them  Bring the list or the pill bottles to follow-up visits  Carry your medicine list with you in case of an emergency  Manage your symptoms:   · Do not drive , walk without help, or operate heavy machinery when you are dizzy  · Move slowly  when you move from one position to another position  Get up slowly from sitting or lying down  Sit or lie down right away if you feel dizzy  · Drink plenty of liquids  Liquids help prevent dehydration  Ask how much liquid to drink each day and which liquids are best for you  · Vestibular and balance rehabilitation therapy (VBRT)  is used to teach you exercises to improve your balance and strength  These exercises may help decrease your vertigo and improve your balance  Ask for more information about this therapy  Follow up with your doctor as directed:  Write down your questions so you remember to ask them during your visits     © Copyright [employer] 2021 Information is for [employer] use only and may not be sold, redistributed or otherwise used for commercial purposes  All illustrations and images included in CareNotes® are the copyrighted property of A D A M , Inc  or Ralf Beltran  The above information is an  only  It is not intended as medical advice for individual conditions or treatments  Talk to your doctor, nurse or pharmacist before following any medical regimen to see if it is safe and effective for you

## 2022-02-01 NOTE — PROGRESS NOTES
Assessment/Plan:    Vertigo  She would likely benefit from VT but will await neuro consult, in case there is another contributory cause  Anxiety  Stable  Continue current medication regimen  Rare atarax use, twice in the last month  Reports she is doing well with seroquel   Will reassess at next scheduled follow-up  Diagnoses and all orders for this visit:    Vertigo  -     meclizine (ANTIVERT) 25 mg tablet; Take 1 tablet (25 mg total) by mouth every 8 (eight) hours    Anxiety          Subjective:      Patient ID: Gary Hatch is a 36 y o  female  Gary Hatch is a 36 y o  female  has a past medical history of Abdominal pain, Anxiety, Asthma, Bipolar disorder ([facility] [address] ), Chronic pain disorder, Depression, Diarrhea, Endometrial disorder, Endometriosis, Epigastric pain, Fatigue, History of COVID-19, Irregular menses, Morbid obesity ([facility] [address] ), Nausea and vomiting, and Yeast infection  She  has a past surgical history that includes Diagnostic laparoscopy; Diagnostic laparoscopy; pr colonoscopy flx dx w/collj spec when pfrmd (N/A, 8/4/2017); Dilation and curettage of uterus; and pr lap,rmv  adnexal structure (Bilateral, 5/4/2021  )  She reports a short intermittent episode of dizziness, nausea, and diaphoresis  She reports that she takes a minute to sit down and the sensation goes away  The symptoms started several years ago and are unchanged  The attacks occur rarely and last a few seconds  Positions that worsen symptoms: none  Previous workup/treatments: none  Associated ear symptoms: none  Associated CNS symptoms: headaches  Recent infections: none  Head trauma: denied  Drug ingestion: none  Noise exposure: no occupational exposure  Family history: non-contributory        The following portions of the patient's history were reviewed and updated as appropriate: allergies, current medications, past family history, past medical history, past social history, past surgical history and problem list     Review of Systems   Constitutional: Negative for chills and fever  HENT: Negative for ear pain and sore throat  Eyes: Negative for pain and visual disturbance  Respiratory: Negative for cough and shortness of breath  Cardiovascular: Negative for chest pain and palpitations  Gastrointestinal: Negative for abdominal pain and vomiting  Genitourinary: Negative for dysuria and hematuria  Musculoskeletal: Negative for arthralgias and back pain  Skin: Negative for color change and rash  Neurological: Positive for dizziness and headaches  Negative for seizures and syncope  All other systems reviewed and are negative  Objective:      /70 (BP Location: Left arm, Patient Position: Sitting, Cuff Size: Large)   Pulse (!) 51   Temp (!) 97 4 °F (36 3 °C) (Temporal)   Resp 16   Ht 5' 5" (1 651 m)   Wt 94 8 kg (209 lb)   SpO2 99%   Breastfeeding No   BMI 34 78 kg/m²          Physical Exam  Vitals and nursing note reviewed  Constitutional:       Appearance: She is obese  HENT:      Head: Normocephalic and atraumatic  Right Ear: External ear normal       Left Ear: External ear normal       Nose: Nose normal    Eyes:      Extraocular Movements: Extraocular movements intact  Conjunctiva/sclera: Conjunctivae normal       Pupils: Pupils are equal, round, and reactive to light  Cardiovascular:      Rate and Rhythm: Normal rate and regular rhythm  Pulses: Normal pulses  Heart sounds: Normal heart sounds  Abdominal:      General: Bowel sounds are normal       Palpations: Abdomen is soft  Tenderness: There is no abdominal tenderness  Musculoskeletal:         General: Normal range of motion  Cervical back: Normal range of motion  Skin:     General: Skin is warm and dry  Neurological:      General: No focal deficit present  Mental Status: She is alert and oriented to person, place, and time  Mental status is at baseline        Cranial Nerves: Cranial nerves are intact  Sensory: Sensation is intact  Motor: Motor function is intact  Coordination: Coordination is intact  Gait: Gait is intact  Psychiatric:         Mood and Affect: Mood normal          Behavior: Behavior normal          Thought Content:  Thought content normal

## 2022-02-01 NOTE — ASSESSMENT & PLAN NOTE
Stable  Continue current medication regimen  Rare atarax use, twice in the last month  Reports she is doing well with seroquel   Will reassess at next scheduled follow-up

## 2022-02-01 NOTE — ASSESSMENT & PLAN NOTE
She would likely benefit from VT but will await neuro consult, in case there is another contributory cause

## 2022-02-11 ENCOUNTER — HOSPITAL ENCOUNTER (OUTPATIENT)
Dept: MRI IMAGING | Facility: HOSPITAL | Age: 32
Discharge: HOME | End: 2022-02-11
Payer: MEDICARE

## 2022-02-11 DIAGNOSIS — R55 SYNCOPE, UNSPECIFIED SYNCOPE TYPE: ICD-10-CM

## 2022-02-11 PROCEDURE — 70553 MRI BRAIN STEM W/O & W/DYE: CPT

## 2022-02-11 PROCEDURE — A9585 GADOBUTROL INJECTION: HCPCS

## 2022-02-11 RX ADMIN — GADOBUTROL 9 ML: 604.72 INJECTION INTRAVENOUS at 10:31

## 2022-02-21 VITALS
HEIGHT: 65 IN | OXYGEN SATURATION: 96 % | WEIGHT: 223 LBS | HEART RATE: 62 BPM | BODY MASS INDEX: 37.15 KG/M2 | DIASTOLIC BLOOD PRESSURE: 80 MMHG | SYSTOLIC BLOOD PRESSURE: 140 MMHG | RESPIRATION RATE: 16 BRPM | TEMPERATURE: 98 F

## 2022-02-22 ENCOUNTER — CONSULT (OUTPATIENT)
Dept: CARDIOLOGY CLINIC | Facility: CLINIC | Age: 32
End: 2022-02-22
Payer: MEDICARE

## 2022-02-22 VITALS
HEIGHT: 65 IN | WEIGHT: 198.6 LBS | DIASTOLIC BLOOD PRESSURE: 70 MMHG | HEART RATE: 67 BPM | BODY MASS INDEX: 33.09 KG/M2 | SYSTOLIC BLOOD PRESSURE: 116 MMHG

## 2022-02-22 DIAGNOSIS — R55 SYNCOPE, UNSPECIFIED SYNCOPE TYPE: ICD-10-CM

## 2022-02-22 DIAGNOSIS — R55 SYNCOPE AND COLLAPSE: Primary | ICD-10-CM

## 2022-02-22 DIAGNOSIS — E66.9 OBESITY (BMI 30-39.9): ICD-10-CM

## 2022-02-22 PROCEDURE — 99244 OFF/OP CNSLTJ NEW/EST MOD 40: CPT | Performed by: INTERNAL MEDICINE

## 2022-02-22 PROCEDURE — 93000 ELECTROCARDIOGRAM COMPLETE: CPT | Performed by: INTERNAL MEDICINE

## 2022-02-22 RX ORDER — IBUPROFEN 800 MG/1
TABLET ORAL
COMMUNITY
Start: 2022-01-24

## 2022-02-22 NOTE — PROGRESS NOTES
Cardiology Office Note  MD Carmen Avilez MD Susette Andes, DO, MD Rachelle Marte DO, Guerda Alvarez DO, [facility] [place]  ----------------------------------------------------------------  [address] [facility]  [address], [address] 36 y o  female MRN: [id number]  Unit/Bed#:  Encounter: [id number]      History of Present Illness: It was a pleasure to see Shoshana Vargas in the office today for initial CV evaluation  She has no significant past medical history  She established care with us in January 2022  She has longstanding history near syncopal and syncopal episodes  These episodes have occurred since she was young  The symptoms begin with significant lightheadedness  She then breaks out in to nausea with diaphoresis, has tunnel vision and then tries to get to the ground in a position that makes her feel comfortable  Unfortunately, the symptoms have been coming on fairly quickly  Overall, she is usually able to get herself to the ground, but at times the symptoms come on so fast that is overwhelming  Most recently, she had an episode in November 2021 where she was seated and had significant lightheadedness and obtaining her head multiple times against the ground  She had not been standing for an extended period time  Each of these episodes she is able to regain consciousness or functionality within moments  She has had prior workup in this was thought to be related to vertigo  She is here today for initial evaluation regarding this set of symptoms  Denies associated chest pain, pressure, tightness or squeezing  Denies lower extremity swelling, orthopnea or paroxysmal nocturnal dyspnea  Admits to some palpitations  Due to her symptoms, she was sent for echocardiogram and Holter monitor  Review of Systems:  Review of Systems   Constitutional: Negative for decreased appetite, fever, weight gain and weight loss  HENT: Negative for congestion and sore throat  Eyes: Negative for visual disturbance  Cardiovascular: Positive for near-syncope, palpitations and syncope  Negative for chest pain, dyspnea on exertion and leg swelling  Respiratory: Negative for cough and shortness of breath  Hematologic/Lymphatic: Negative for bleeding problem  Skin: Negative for rash  Musculoskeletal: Negative for myalgias and neck pain  Gastrointestinal: Negative for abdominal pain and nausea  Neurological: Positive for light-headedness  Negative for weakness  Psychiatric/Behavioral: Negative for depression  Past Medical History:   Diagnosis Date    Abdominal pain     Anxiety     Asthma     as young child    Bipolar disorder ([facility] [address] )     Chronic pain disorder     right hip    Depression     Diarrhea     Endometrial disorder 12/2012    Endometriosis     Epigastric pain     Fatigue     History of COVID-19 1/3/2022    On 12/25/21    Irregular menses 9/11/2014    Morbid obesity (HCC)     Nausea and vomiting     Yeast infection 8/23/2017       Past Surgical History:   Procedure Laterality Date    DIAGNOSTIC LAPAROSCOPY      DIAGNOSTIC LAPAROSCOPY      DILATION AND CURETTAGE OF UTERUS      MS COLONOSCOPY FLX DX W/COLLJ SPEC WHEN PFRMD N/A 8/4/2017    Procedure: EGD with bx AND COLONOSCOPY with bx;  Surgeon: Marcio Johansen MD;  Location: [facility]; Service: Gastroenterology    MS LAP,RMV  ADNEXAL STRUCTURE Bilateral 5/4/2021    Procedure: LAP SALPINGECTOMY;  fulgeration of endometriosis;   Surgeon: Denice Zamora MD;  Location: [facility];  Service: Gynecology       Social History     Socioeconomic History    Marital status: Single     Spouse name: None    Number of children: None    Years of education: None    Highest education level: None   Occupational History    None   Tobacco Use    Smoking status: Former Smoker     Packs/day: 0 25     Types: Cigarettes    Smokeless tobacco: Never Used    Tobacco comment: 1/2 pack/ week   Vaping Use    Vaping Use: Never used   Substance and Sexual Activity    Alcohol use: Yes     Comment: 2-3 glass/day wine or liquor    Drug use: Yes     Types: Marijuana     Comment: smokes daily    Sexual activity: Yes     Partners: Male     Birth control/protection: Inserts   Other Topics Concern    None   Social History Narrative    None     Social Determinants of Health     Financial Resource Strain: Not on file   Food Insecurity: Not on file   Transportation Needs: Not on file   Physical Activity: Not on file   Stress: Not on file   Social Connections: Not on file   Intimate Partner Violence: Not on file   Housing Stability: Not on file       Family History   Problem Relation Age of Onset    No Known Problems Mother     No Known Problems Father        Allergies   Allergen Reactions    Shellfish-Derived Products - Food Allergy Anaphylaxis    Adhesive [Medical Tape] Swelling    Chlorhexidine Itching     burning    Other Swelling     All fruits         Current Outpatient Medications:     hydrOXYzine HCL (ATARAX) 25 mg tablet, Take 1 tablet (25 mg total) by mouth every 6 (six) hours as needed for anxiety, Disp: 30 tablet, Rfl: 1    ibuprofen (MOTRIN) 800 mg tablet, TOME REMBERTO TABLETA POR V A ORAL CADA SEIS HORAS CUANDO SEA NECESARIO PAIN (Patient not taking: Reported on 2/22/2022), Disp: , Rfl:     meclizine (ANTIVERT) 25 mg tablet, Take 1 tablet (25 mg total) by mouth every 8 (eight) hours (Patient not taking: Reported on 2/22/2022 ), Disp: 30 tablet, Rfl: 0    QUEtiapine (SEROquel) 50 mg tablet, Take 1 tablet (50 mg total) by mouth daily at bedtime (Patient not taking: Reported on 2/22/2022 ), Disp: 90 tablet, Rfl: 1    Vitals:    02/22/22 1614   BP: 116/70   Pulse: 67   Weight: 90 1 kg (198 lb 9 6 oz)   Height: 5' 5" (1 651 m)       PHYSICAL EXAMINATION:  Gen: Awake, Alert, NAD   Head/eyes: AT/NC, pupils equal and round, Anicteric  ENT: mmm  Neck: Supple, No elevated JVP, trachea midline  Resp: CTA bilaterally no w/r/r  CV: RRR +S1, S2, No m/r/g  Abd: Soft, obese, NT/ND + BS  Ext: no LE edema bilaterally  Neuro: Follows commands, moves all extermities  Psych: Appropriate affect, normal mood, pleasant attitude, non-combative  Skin: warm; no rash, erythema or venous stasis changes on exposed skin    --------------------------------------------------------------------------------  TREADMILL STRESS  No results found for this or any previous visit      --------------------------------------------------------------------------------  NUCLEAR STRESS TEST: No results found for this or any previous visit  No results found for this or any previous visit       --------------------------------------------------------------------------------  CATH:  No results found for this or any previous visit     --------------------------------------------------------------------------------  ECHO:   No results found for this or any previous visit  No results found for this or any previous visit     --------------------------------------------------------------------------------  HOLTER  No results found for this or any previous visit      No results found for this or any previous visit     --------------------------------------------------------------------------------  CAROTIDS  No results found for this or any previous visit      --------------------------------------------------------------------------------  ECGs:  Results for orders placed or performed in visit on 02/22/22   POCT ECG    Impression    Sinus rhythm 67 bpm, normal ECG        Lab Results   Component Value Date    WBC 5 46 12/22/2021    HGB 13 1 12/22/2021    HCT 40 0 12/22/2021    MCV 92 12/22/2021     12/22/2021      Lab Results   Component Value Date    SODIUM 143 12/22/2021    K 3 9 12/22/2021     12/22/2021    CO2 27 12/22/2021    BUN 9 12/22/2021    CREATININE 0 70 12/22/2021    GLUC 83 12/22/2021    CALCIUM 8 7 12/22/2021      Lab Results   Component Value Date    HGBA1C 5 2 07/28/2015      Lab Results   Component Value Date    CHOL 195 03/24/2014     Lab Results   Component Value Date    HDL 52 03/24/2014     Lab Results   Component Value Date    LDLCALC 124 (H) 03/24/2014     Lab Results   Component Value Date    TRIG 96 03/24/2014     No results found for: [place], [place]   Lab Results   Component Value Date    INR 1 11 06/24/2021    INR 1 01 04/21/2018    PROTIME 14 1 06/24/2021    PROTIME 13 3 04/21/2018        1  Syncope and collapse  -     POCT ECG  -     Stress test only, exercise; Future; Expected date: 02/22/2022  -     Compression Stocking    2  Obesity (BMI 30-39 9)    3  Syncope, unspecified syncope type  -     Ambulatory referral to Cardiology        IMPRESSION:   Syncope likely vasovagal   Obesity   LVEF 57%, trace MR/TR/IL, December 2021   Holter w/ SR avg HR 86 bpm, rare VPCs, rare APCs, December 2021    PLAN:  It was a pleasure to see Sheree Berg in the office today for initial CV evaluation  She is here today due to her episodes of syncope or near syncope  She has had lightheadedness with nausea, flushing, warm sensation all over and tunnel vision  The symptoms are all consistent with vasovagal syncope  She has no symptoms concerning for angina and no signs or symptoms of heart failure  She examines to be euvolemic in the office today  Blood pressure and heart rate are currently stable  ECG is nonischemic  Unfortunately, she has had no clear trigger that is related to her vasovagal syncope  Holter monitor performed in December 2021 showed no significant arrhythmia and echocardiogram demonstrated normal LV function without significant valvular disease  Based on her clinical presentation, I have the following recommendations:    1  Recommend checking exercise stress test to assess for any evidence of underlying myocardial ischemia  2   Check 2 week event recorder to assess for any evidence of arrhythmia  3  Strongly recommend that she use compression stockings to help maintain blood pressure  4  Encourage liberalization of salt intake  She should increase the salt in her diet on a regular basis to help maintain blood pressure  5  Would encourage her to drink at least 2 5 L of water on a daily basis  Avoid dehydrating liquid such as alcohol, coffee and tea  6  Should these changes not be effective in helping with the symptoms, may consider the initiation of midodrine or fludrocortisone  7  As she has had episodes with extremely short prodrome and difficulty with being able to get into a safe position and most recently in November had an episode where she was seated, I do not believe that she should the operating heavy machinery at this time until her symptoms have improved  Patient understands this  8  We will follow up with her after testing to review the results and to see how she is doing with the compression stockings and liberalization of salt  As always, please do not hesitate to call with any questions  Portions of the record may have been created with voice recognition software  Occasional wrong word or "sound a like" substitutions may have occurred due to the inherent limitations of voice recognition software  Read the chart carefully and recognize, using context, where substitutions have occurred        Signed: Mc Corea DO, Nita Friendly

## 2022-02-25 ENCOUNTER — TELEMEDICINE (OUTPATIENT)
Dept: NEUROLOGY | Facility: CLINIC | Age: 32
End: 2022-02-25
Payer: MEDICARE

## 2022-02-25 DIAGNOSIS — G44.40 MEDICATION OVERUSE HEADACHE: ICD-10-CM

## 2022-02-25 DIAGNOSIS — R40.4 SPELL OF ALTERED CONSCIOUSNESS: ICD-10-CM

## 2022-02-25 DIAGNOSIS — G43.909 MIGRAINES: Primary | ICD-10-CM

## 2022-02-25 DIAGNOSIS — R55 SYNCOPE, UNSPECIFIED SYNCOPE TYPE: ICD-10-CM

## 2022-02-25 PROCEDURE — 99205 OFFICE O/P NEW HI 60 MIN: CPT | Performed by: STUDENT IN AN ORGANIZED HEALTH CARE EDUCATION/TRAINING PROGRAM

## 2022-02-25 RX ORDER — TOPIRAMATE 25 MG/1
50 TABLET ORAL 2 TIMES DAILY
Qty: 160 TABLET | Refills: 4 | Status: SHIPPED | OUTPATIENT
Start: 2022-02-25

## 2022-02-25 NOTE — PROGRESS NOTES
[facility] -   Video/Telephone Consultation      Provider located at [address]  [address] [facility], [place] [address] [facility]      The patient was identified by name and date of birth  Kaye Serna was informed that this is a telemedicine visit and that the visit is being conducted through [facility] and patient was informed this is a secure, HIPAA-complaint platform  She agrees to proceed     My office door was closed  No one else was in the room  He acknowledged consent and understanding of privacy and security of the video platform  The patient has agreed to participate and understands they can discontinue the visit at any time  Patient is aware this is a billable service  Impression/Plan    Ms HAUSER is a 36 y o , right handed female with PMH of anxiety, depression, headaches and syncopal spells presenting to the [place] [address] [facility] for follow up of headaches and syncopal spells  While her historical headaches appeared to be migrainous in nature, her active chronic daily headaches do not fit diagnostic criteria for migraine  My suspension is that she is having medication overuse headaches secondary to excessive Excedrin use  While most of her syncopal events sound like vasovagal, the event in November is odd due to the retained awareness and uncontrolled movements of her body  This is also atypical for epileptic seizure  Panic attacks could have overlapping symptoms but she reports that mood is well controlled  Plan outlined below:    #Chronic daily headache/medication overuse headache  - Preventative: topiramate titration to 50 BID  - Abortive: Excedrin, no more than 10-12 days a month  - Lifestyle: recommended no more than 12 Oz of coffee a day  - Reinforced reducing use of Excedrin  #Syncopal spells  - Routine EEG  - Tilt table test    - Follow up in 3 months or sooner if needed      Diagnoses and all orders for this visit:    Migraines  -     topiramate (Topamax) 25 mg tablet; Take 2 tablets (50 mg total) by mouth 2 (two) times a day    Syncope, unspecified syncope type  -     Tilt table; Future    Spell of altered consciousness  -     EEG awake or drowsy routine; Future    Medication overuse headache      I spent 50 minutes with patient today in which greater than 50% of the time was spent in counseling/coordination of care regarding Migraines, treatment, medication overuse headache, and diagnostic plan for syncopal attacks  Franklin Esquivel is a 36 y o  right handed female presenting to the [place] [address] for syncopal events    The patient has a longstanding history of syncopal and neat syncopal episodes  Per cardiology note: These episodes have occurred since she was young  The symptoms begin with significant lightheadedness  She then breaks out in to nausea with diaphoresis, has tunnel vision and then tries to get to the ground in a position that makes her feel comfortable  Unfortunately, the symptoms have been coming on fairly quickly  Overall, she is usually able to get herself to the ground, but at times the symptoms come on so fast that is overwhelming  Most recently, she had an episode in November 2021 where she was seated and had significant lightheadedness and obtaining her head multiple times against the ground  This was different from other events  She said that she got extremely sweaty, gets tunnel vision, and just laid there  It was slightly different because she she was aware of what was happening while her head was hitting the floor while face down leading to facial injury  She was able to talk while it was happening screaming for help  She felt that she couldn't control her body  The last typical syncopal/presyncopal event was in January  She saw cardiology on 12/23/22 for the first time with these complaints  EKG was unremarkable   Holter monitor from 12/2021 showed rare APV and VPCs  Echocardiogram showed normal LV function and no valvular disease  Recommendations at that last visit was to perform exercise stress test, 2 week event recorder, and lifestyle interventions to reduce risk of vasovagal syncope  She also endorses having a lot of headaches  She goes through bottles of excedrin often  Headache characteristics:  Age of Onset: Highschool  Location: Midline occipital or bitemporal  Quality: Sometimes sharp, difficult to explain  Severity: 7/10 a the worst 4/5 typical  Freq (HA days per month/Severe HA days per month): Every day  Duration: Hours, wakes up with headache  Aura: No  Associated (photo/phono/osmo): Phonophobia  Nausea/Vomiting: Nausea and vomiting in the past but not recently  Neurologic Features (numb arm/weak leg): None  Triggers: Night time, being around her children and make headaches worse  Relieving factors: Excedrin    Missed work days in the last 6 months: In short term disability but for another reason  Bed rest days: 3 days a week for a short period of time    OTC Rx (OTC med use/treatment days per month): Excedrin (unknown dose) twice a day, daily for the last two months  Current Prescription Rx:    Abortive: Excedrin   Preventative: None    Past Prescription Hx:  Tylenol, Motrin, Aleve     OCPs: Tube  Pregnancy: None    Sleep: 8-9 hours  Mood: Anxiety/depression but well controlled  Caffeine: One large caffeine ice coffee  Water: Drinks a gallon of water a day  Exercise: home workout x3-4  Regular meals: 3 meals a day    History Reviewed:    The following were reviewed and updated as appropriate: allergies, current medications, past family history, past medical history, past social history, past surgical history and problem list     Psychiatric History:  Anxiety  Depression    Social History:   Driving: Yes  Lives Alone: No  Occupation: Works from home    ROS:  A 12 system review of system was obtained and otherwise negative except as per HPI  Objective    Exam was limited due to video conferencing    GENERAL EXAMINATION:   In general patient is well appearing and in no distress  NEUROLOGIC EXAMINATION:   Alert and oriented  Fund of knowledge is full with good understanding of medical situation  Recent and remote memory were intact    Mood and affect are appropriate  Attention is intact  Language function including fluency and comprehension intact  Cranial nerves: Extraocular movements are intact without nystagmus  No facial droop, face activates symmetrically  There is no dysarthria  Hearing was intact to voice  Tongue was midline with normal movement  Motor Exam:  She moved all extremities with appearing full strength  Deep tendon reflexes: not able to be tested     Sensation: unable to assess    Coordination: Deferred    Gait: Deferred    Albert Epps MD   [facility]  [facility] [address] [facility] [employer] [facility] and Clinical Neurophysiology     VIRTUAL VISIT [address] acknowledges that he/she has consented to an online visit or consultation  He/She understands that the online visit is based solely on information provided by him/her, and that, in the absence of a face-to-face physical evaluation by the physician, the diagnosis he/she receives is both limited and provisional in terms of accuracy and completeness  This is not intended to replace a full medical face-to-face evaluation by the physician  oTpher Georges understands and accepts these terms

## 2022-03-07 ENCOUNTER — HOSPITAL ENCOUNTER (OUTPATIENT)
Dept: NON INVASIVE DIAGNOSTICS | Facility: HOSPITAL | Age: 32
Discharge: HOME | End: 2022-03-07
Attending: INTERNAL MEDICINE
Payer: MEDICARE

## 2022-03-07 VITALS — BODY MASS INDEX: 32.99 KG/M2 | WEIGHT: 198 LBS | HEIGHT: 65 IN

## 2022-03-07 DIAGNOSIS — R55 SYNCOPE AND COLLAPSE: ICD-10-CM

## 2022-03-07 LAB
BASELINE ST DEPRESSION: 0 MM
MAX HR PERCENT: 86 %
MAX HR: 191 BPM
RATE PRESSURE PRODUCT: NORMAL
SL CV STRESS RECOVERY BP: NORMAL MMHG
SL CV STRESS RECOVERY HR: 77 BPM
SL CV STRESS RECOVERY O2 SAT: 100 %
SL CV STRESS STAGE REACHED: 3
STRESS ANGINA INDEX: 0
STRESS BASELINE BP: NORMAL MMHG
STRESS BASELINE HR: 53 BPM
STRESS DUKE TREADMILL SCORE: 8
STRESS O2 SAT REST: 99 %
STRESS PEAK HR: 166 BPM
STRESS PERCENT HR: 86 %
STRESS POST ESTIMATED WORKLOAD: 10.1 METS
STRESS POST EXERCISE DUR MIN: 8 MIN
STRESS POST EXERCISE DUR SEC: 10 SEC
STRESS POST O2 SAT PEAK: 100 %
STRESS POST PEAK BP: 152 MMHG
STRESS ST DEPRESSION: 0 MM
STRESS TARGET HR: 166 BPM

## 2022-03-07 PROCEDURE — 93017 CV STRESS TEST TRACING ONLY: CPT

## 2022-03-07 PROCEDURE — 93016 CV STRESS TEST SUPVJ ONLY: CPT | Performed by: INTERNAL MEDICINE

## 2022-03-07 PROCEDURE — 93018 CV STRESS TEST I&R ONLY: CPT | Performed by: INTERNAL MEDICINE

## 2022-03-08 ENCOUNTER — TELEPHONE (OUTPATIENT)
Dept: PSYCHIATRY | Facility: CLINIC | Age: 32
End: 2022-03-08

## 2022-03-08 LAB
CHEST PAIN STATEMENT: NORMAL
MAX DIASTOLIC BP: 74 MMHG
MAX HEART RATE: 166 BPM
MAX PREDICTED HEART RATE: 191 BPM
MAX. SYSTOLIC BP: 152 MMHG
PROTOCOL NAME: NORMAL
TARGET HR FORMULA: NORMAL
TEST INDICATION: NORMAL
TIME IN EXERCISE PHASE: NORMAL

## 2022-03-23 ENCOUNTER — HOSPITAL ENCOUNTER (OUTPATIENT)
Dept: NEUROLOGY | Facility: CLINIC | Age: 32
Discharge: HOME | End: 2022-03-23
Payer: MEDICARE

## 2022-03-23 DIAGNOSIS — R40.4 SPELL OF ALTERED CONSCIOUSNESS: ICD-10-CM

## 2022-03-23 PROCEDURE — 95816 EEG AWAKE AND DROWSY: CPT | Performed by: PSYCHIATRY & NEUROLOGY

## 2022-03-23 PROCEDURE — 95816 EEG AWAKE AND DROWSY: CPT

## 2022-03-24 ENCOUNTER — TELEPHONE (OUTPATIENT)
Dept: CARDIOLOGY CLINIC | Facility: CLINIC | Age: 32
End: 2022-03-24

## 2022-03-24 NOTE — TELEPHONE ENCOUNTER
Pt called LM having unusual symptoms(not mentioned what)and would like to discuss   returned call and reached  LM to call back  Pt returned call states had occurrence about 4/5 times over past week---last occurrence this AM  Severe side sticker sensation which causes her to be hard to catch her breath can occur at anytime    states the morning it lasted 6/7 minutes and caused her to cry  occurred just sitting    told patient if occurs and dosent go away to go to ED and will ask Dr Javy Jung for his recommendations

## 2022-03-28 DIAGNOSIS — R07.2 PRECORDIAL PAIN: Primary | ICD-10-CM

## 2022-03-28 NOTE — PROGRESS NOTES
Patient admits to sharp discomfort in her chest that is worse with deep inspiration  May be some associated shortness of breath, but she cannot recall  While it is likely musculoskeletal, will perform stat D-dimer to rule out possible PE as an etiology  Blood work has been ordered  If her symptoms worsen or recur, recommend seeking immediate medical attention  She is currently asymptomatic  All questions answered

## 2022-04-13 ENCOUNTER — CLINICAL SUPPORT (OUTPATIENT)
Dept: CARDIOLOGY CLINIC | Facility: CLINIC | Age: 32
End: 2022-04-13
Payer: MEDICARE

## 2022-04-13 DIAGNOSIS — R55 SYNCOPE AND COLLAPSE: ICD-10-CM

## 2022-04-13 PROCEDURE — 93248 EXT ECG>7D<15D REV&INTERPJ: CPT | Performed by: INTERNAL MEDICINE

## 2022-04-26 ENCOUNTER — TELEPHONE (OUTPATIENT)
Dept: CARDIOLOGY CLINIC | Facility: CLINIC | Age: 32
End: 2022-04-26

## 2022-04-26 NOTE — TELEPHONE ENCOUNTER
----- Message from Dominique Rodrigez DO sent at 4/26/2022 12:54 PM EDT -----  The patient appears to have completed or is close to completing all testing  Please schedule patient for office follow-up to review the results  Thank you

## 2022-06-01 ENCOUNTER — OFFICE VISIT (OUTPATIENT)
Dept: NEUROLOGY | Facility: CLINIC | Age: 32
End: 2022-06-01
Payer: MEDICARE

## 2022-06-01 VITALS
BODY MASS INDEX: 33.08 KG/M2 | DIASTOLIC BLOOD PRESSURE: 70 MMHG | WEIGHT: 198.8 LBS | SYSTOLIC BLOOD PRESSURE: 120 MMHG | TEMPERATURE: 97.5 F

## 2022-06-01 DIAGNOSIS — R40.4 SPELL OF ALTERED CONSCIOUSNESS: Primary | ICD-10-CM

## 2022-06-01 DIAGNOSIS — G43.909 MIGRAINES: ICD-10-CM

## 2022-06-01 DIAGNOSIS — R55 SYNCOPE, UNSPECIFIED SYNCOPE TYPE: ICD-10-CM

## 2022-06-01 PROCEDURE — 99215 OFFICE O/P EST HI 40 MIN: CPT | Performed by: STUDENT IN AN ORGANIZED HEALTH CARE EDUCATION/TRAINING PROGRAM

## 2022-06-01 NOTE — PATIENT INSTRUCTIONS
Recommend the following supplement to help with headache frequency  Can get over the counter:    Magnesium oxide: 500 mg daily  Coenzyme Q10 200 mg daily  Riboflavin (vitamin B2) 400 mg daily May discolor urine    OK to take up to 9 doses of Excedrin for headache control a month    Follow up with cardiology  Hold off on Tilt table test at this time  If there are any more event like the one in November please call  Follow up in 4 months

## 2022-06-01 NOTE — PROGRESS NOTES
[facility] -   Follow up appointment    Impression/Plan    Ms Maegan Knowles is a 36 y o , right handed female with PMH of anxiety, depression, headaches and syncopal spells presenting to the [place] [address] [facility] for follow up of headaches and syncopal spells  Regarding headaches, she experiences 12-15 HA days a month with only 4 being severe  Excedrin is an effective abortive therapy  The patient was interested in a more natural approach to HA prevention  Regarding presyncope/syncopal events, she hasn't had any more since our last appointment  Cardiology had completed their testing which was largely unremarkable  There were no signs of a cardiac etiology for her presyncopal symptoms  Additionally, her MRI and EEG is negative  I have low concern for epilepsy as the cause of her presyncopal symptoms  A singular event which had shaking with retained awareness doesn't warrant an AED at this time  OK to defer Tilt table as it is mostly likely that she has some mild autonomic dysfunction based on clinical history  Defer management of this to cardiology  Plan outlined below:  #Migraine headaches  -Magnesium oxide: 500 mg daily  -Coenzyme Q10 200 mg daily  -Riboflavin (vitamin B2) 400 mg daily May discolor urine  -OK to take up to 9 doses of Excedrin for headache control a month    #Syncopal/presyncopal spells  -Follow up with cardiology    -Defer Tilt table test at this time  - If there are any more event like the one in November please call and we will reorder tilt table and also consider additional EEG monitoring     - Follow up in 4 months or sooner if needed      Diagnoses and all orders for this visit:    Spell of altered consciousness    Syncope, unspecified syncope type    Migraines        Franklin Velez is a 36 y o  right handed female with PMH of anxiety, depression, headaches and syncopal spells presenting to the [place] [address] [facility] for follow up of headaches and syncopal spells  For review:     The patient has a longstanding history of syncopal and neat syncopal episodes      Per cardiology note: These episodes have occurred since she was young   The symptoms begin with significant lightheadedness   She then breaks out in to nausea with diaphoresis, has tunnel vision and then tries to get to the ground in a position that makes her feel comfortable   Unfortunately, the symptoms have been coming on fairly quickly   Overall, she is usually able to get herself to the ground, but at times the symptoms come on so fast that is overwhelming        Most recently, she had an episode in November 2021 where she was seated and had significant lightheadedness and obtaining her head multiple times against the ground  This was different from other events  She said that she got extremely sweaty, gets tunnel vision, and just laid there  It was slightly different because she she was aware of what was happening while her head was hitting the floor while face down leading to facial injury  She was able to talk while it was happening screaming for help  She felt that she couldn't control her body      The last typical syncopal/presyncopal event was in January       She saw cardiology on 12/23/22 for the first time with these complaints  EKG was unremarkable  Holter monitor from 12/2021 showed rare APV and VPCs  Echocardiogram showed normal LV function and no valvular disease  Recommendations at that last visit was to perform exercise stress test, 2 week event recorder, and lifestyle interventions to reduce risk of vasovagal syncope  She was first seen by me in 2/25/2022  While I felt that most of her syncopal events sound like vasovagal, the event in November was odd due to the retained awareness and uncontrolled movements of her body  This was also atypical for epileptic seizure  Panic attacks could have overlapping symptoms but she reports that mood is well controlled   I recommended a routine EEG and Tilt table test      She also has chronic daily headache/medication overuse headache due to BID Excedrin use  The plan was to topamax 50 BID, decrease excedrin use and lifestyle modification    Interval history:    Since last seen, things have been stable  She feels that she has a hard time standing for too long in the heat  She will get dizzy and nauseated  She hasn't syncopized, but is she lies down and catches it, nothing will progress  There have been no more events like the one where she hit her head on the ground (Nov 2021)  The patient stopped taking the topamax because she didn't like how it made her feel  After taking it by 1 5-2 month  She has a headache today  Since last seen she has only taken excedrin twice  The headaches are occipital and shoot forward  She has only bad headache a week, she has 3-4 dull headache rated 5/10 in severity  She headaches are slightly better since February but not by much  Routine EEG on 3/23/2022 was normal  Tilt table was not completed  She has completed a cardiac stress test which was normal and a 14 day holter monitor which didn't find any clinically significant arrhythmias  History Reviewed: The following were reviewed and updated as appropriate: allergies, current medications, past family history, past medical history, past social history, past surgical history and problem list    Psychiatric History:  Anxiety  Depression     Social History:   Driving: Yes  Lives Alone: No  Occupation: Works from home    ROS:  Constitutional: Negative  Negative for appetite change and fever  HENT: Negative  Negative for hearing loss, tinnitus, trouble swallowing and voice change  Eyes: Negative  Negative for photophobia and pain  Respiratory: Negative  Negative for shortness of breath  Cardiovascular: Negative  Negative for palpitations  Gastrointestinal: Negative  Negative for nausea and vomiting  Endocrine: Negative    Negative for cold intolerance  Genitourinary: Negative  Negative for dysuria, frequency and urgency  Musculoskeletal: Negative  Negative for myalgias and neck pain  Skin: Negative  Negative for rash  Neurological: Positive for headaches  Negative for dizziness, tremors, seizures, syncope, facial asymmetry, speech difficulty, weakness, light-headedness and numbness  Hematological: Negative  Does not bruise/bleed easily  Psychiatric/Behavioral: Negative  Negative for confusion, hallucinations and sleep disturbance  All other systems reviewed and are negative  Objective    /70   Temp 97 5 °F (36 4 °C)   Wt 90 2 kg (198 lb 12 8 oz)   BMI 33 08 kg/m²      General Exam  General: well developed, no acute distress  HEENT: mucous membranes moist, anicteric sclera  Neck: supple, good ROM  Extremities: no clubbing, cyanosis or edema  Skin: no rash on visible skin  Neurological Exam  Mental Status: awake, alert, and fully oriented to person, place, time, and situation  Attention and memory intact  Fund of knowledge is appropriate for age and education  There is no neglect  Language: fluency, and comprehension normal        Cranial Nerves: Pupils equal and reactive to light  Visual fields full to confrontation  Extraocular motions intact with full versions, normal pursuits and saccades  Facial strength full and symmetric  Hearing intact to voice  Tongue protrudes to midline  Palate elevates symmetrically  Speech clear without notable dysarthria  Motor: Normal bulk and tone  No pronator drift  Strength is 5/5 proximally and distally in all 4 extremities  No involuntary movements  Sensory: Sensation intact to light touch distally in all extremities  Coordination: Normal finger-to-nose  Normal rapid alternating movements  Station and gait: Casual and tandem gait normal  Normal Romberg  Reflexes: Reflexes 2+ throughout and symmetric        Cali Tijerina MD   [address] Neurology [address] [facility], [facility] Neurology and Clinical Neurophysiology

## 2022-06-01 NOTE — PROGRESS NOTES
Patient ID: Jovan Blackwell is a 36 y o  female  Assessment/Plan:    No problem-specific Assessment & Plan notes found for this encounter  {Assess/PlanSmartLinks:43650}       Subjective:    HPI    {[facility] HPI texts:78876}    {Common ambulatory SmartLinks:81039}         Objective:    not currently breastfeeding  Physical Exam    Neurological Exam      ROS:    Review of Systems   Constitutional: Negative  Negative for appetite change and fever  HENT: Negative  Negative for hearing loss, tinnitus, trouble swallowing and voice change  Eyes: Negative  Negative for photophobia and pain  Respiratory: Negative  Negative for shortness of breath  Cardiovascular: Negative  Negative for palpitations  Gastrointestinal: Negative  Negative for nausea and vomiting  Endocrine: Negative  Negative for cold intolerance  Genitourinary: Negative  Negative for dysuria, frequency and urgency  Musculoskeletal: Negative  Negative for myalgias and neck pain  Skin: Negative  Negative for rash  Neurological: Negative  Negative for dizziness, tremors, seizures, syncope, facial asymmetry, speech difficulty, weakness, light-headedness, numbness and headaches  Hematological: Negative  Does not bruise/bleed easily  Psychiatric/Behavioral: Negative  Negative for confusion, hallucinations and sleep disturbance  All other systems reviewed and are negative

## 2022-06-07 ENCOUNTER — TELEPHONE (OUTPATIENT)
Dept: PSYCHIATRY | Facility: CLINIC | Age: 32
End: 2022-06-07

## 2022-06-17 ENCOUNTER — OFFICE VISIT (OUTPATIENT)
Dept: CARDIOLOGY CLINIC | Facility: CLINIC | Age: 32
End: 2022-06-17
Payer: MEDICARE

## 2022-06-17 VITALS
WEIGHT: 190 LBS | DIASTOLIC BLOOD PRESSURE: 80 MMHG | SYSTOLIC BLOOD PRESSURE: 130 MMHG | HEIGHT: 65 IN | HEART RATE: 70 BPM | RESPIRATION RATE: 18 BRPM | BODY MASS INDEX: 31.65 KG/M2

## 2022-06-17 DIAGNOSIS — E66.9 OBESITY (BMI 30-39.9): ICD-10-CM

## 2022-06-17 DIAGNOSIS — R55 SYNCOPE AND COLLAPSE: Primary | ICD-10-CM

## 2022-06-17 PROCEDURE — 99214 OFFICE O/P EST MOD 30 MIN: CPT | Performed by: INTERNAL MEDICINE

## 2022-06-17 RX ORDER — ACETAMINOPHEN, ASPIRIN AND CAFFEINE 250; 250; 65 MG/1; MG/1; MG/1
1 TABLET, FILM COATED ORAL EVERY 6 HOURS PRN
COMMUNITY

## 2022-06-17 NOTE — PROGRESS NOTES
Cardiology Office Note  Majorie Jubilee, MD Lavetta Opitz, MD Ellie Parrot, DO, MD Dilma St DO, Lisette Vale DO, [facility] - [place]  ----------------------------------------------------------------  [address], [facility] Graham Barahona Ne 36 y o  female MRN: [id number]  Unit/Bed#:  Encounter: [id number]      History of Present Illness: It was a pleasure to see Karol Melvin in the office today for follow-up CV evaluation  She has no significant past medical history  She established care with us in January 2022  She has longstanding history near syncopal and syncopal episodes  These episodes have occurred since she was young  The symptoms begin with significant lightheadedness  She then breaks out in to nausea with diaphoresis, has tunnel vision and then tries to get to the ground in a position that makes her feel comfortable  Unfortunately, the symptoms have been coming on fairly quickly  Overall, she is usually able to get herself to the ground, but at times the symptoms come on so fast that is overwhelming  Most recently, she had an episode in November 2021 where she was seated and had significant lightheadedness and obtaining her head multiple times against the ground  She had not been standing for an extended period time  Each of these episodes she is able to regain consciousness or functionality within moments  She has had prior workup in this was thought to be related to vertigo  Due to her symptoms, she was sent for stress test, echocardiogram and event recorder  She is here today discuss the results  Since our last encounter, she has been looking for more symptoms related to the episodes  She notices that when she feels that her back is more sweaty, her symptoms will be coming on in the near future    Additionally, she feels as though she has much more time to get into a safe position and almost entirely happens when she is in the standing position with more prominent prodrome  She has been staying well hydrated and feels the symptoms have mildly improved, but she still has had some episodes  Denies associated chest pain, pressure, tightness or squeezing  Denies lower extremity swelling, orthopnea or paroxysmal nocturnal dyspnea  Review of Systems:  Review of Systems   Constitutional: Negative for decreased appetite, fever, weight gain and weight loss  HENT: Negative for congestion and sore throat  Eyes: Negative for visual disturbance  Cardiovascular: Positive for near-syncope and palpitations  Negative for chest pain, dyspnea on exertion, leg swelling and syncope  Respiratory: Negative for cough and shortness of breath  Hematologic/Lymphatic: Negative for bleeding problem  Skin: Negative for rash  Musculoskeletal: Negative for myalgias and neck pain  Gastrointestinal: Negative for abdominal pain and nausea  Neurological: Positive for light-headedness  Negative for weakness  Psychiatric/Behavioral: Negative for depression  Past Medical History:   Diagnosis Date    Abdominal pain     Anxiety     Asthma     as young child    Bipolar disorder ([facility] [address] )     Chronic pain disorder     right hip    Depression     Diarrhea     Endometrial disorder 12/2012    Endometriosis     Epigastric pain     Fatigue     History of COVID-19 1/3/2022    On 12/25/21    Irregular menses 9/11/2014    Morbid obesity (HCC)     Nausea and vomiting     Yeast infection 8/23/2017       Past Surgical History:   Procedure Laterality Date    DIAGNOSTIC LAPAROSCOPY      DIAGNOSTIC LAPAROSCOPY      DILATION AND CURETTAGE OF UTERUS      IA COLONOSCOPY FLX DX W/COLLJ SPEC WHEN PFRMD N/A 8/4/2017    Procedure: EGD with bx AND COLONOSCOPY with bx;  Surgeon: Fátima Manning MD;  [facility]: [facility];   Service: Gastroenterology    IA LAP,RMV  ADNEXAL STRUCTURE Bilateral 5/4/2021    Procedure: LAP SALPINGECTOMY;  fulgeration of endometriosis; Surgeon: Jamarcus Coleman MD;  Location: [facility];  Service: Gynecology       Social History     Socioeconomic History    Marital status: Single     Spouse name: None    Number of children: None    Years of education: None    Highest education level: None   Occupational History    None   Tobacco Use    Smoking status: Current Every Day Smoker     Packs/day: 0 50     Types: Cigarettes    Smokeless tobacco: Never Used    Tobacco comment: 1/2 pack/ week   Vaping Use    Vaping Use: Never used   Substance and Sexual Activity    Alcohol use: Yes     Comment: 2-3 glass/day wine or liquor    Drug use: Yes     Types: Marijuana     Comment: smokes daily    Sexual activity: Yes     Partners: Male     Birth control/protection: Inserts   Other Topics Concern    None   Social History Narrative    None     Social Determinants of Health     Financial Resource Strain: Not on file   Food Insecurity: Not on file   Transportation Needs: Not on file   Physical Activity: Not on file   Stress: Not on file   Social Connections: Not on file   Intimate Partner Violence: Not on file   Housing Stability: Not on file       Family History   Problem Relation Age of Onset    No Known Problems Mother     No Known Problems Father        Allergies   Allergen Reactions    Shellfish-Derived Products - Food Allergy Anaphylaxis    Adhesive [Medical Tape] Swelling    Chlorhexidine Itching     burning    Other Swelling     All fruits         Current Outpatient Medications:     aspirin-acetaminophen-caffeine (EXCEDRIN MIGRAINE) 250-250-65 MG per tablet, Take 1 tablet by mouth every 6 (six) hours as needed for headaches As needed  , Disp: , Rfl:     hydrOXYzine HCL (ATARAX) 25 mg tablet, Take 1 tablet (25 mg total) by mouth every 6 (six) hours as needed for anxiety (Patient not taking: No sig reported), Disp: 30 tablet, Rfl: 1    ibuprofen (MOTRIN) 800 mg tablet, TOME REMBERTO TABLETA POR V A ORAL CADA SEIS HORAS CUANDO SEA NECESARIO PAIN (Patient not taking: No sig reported), Disp: , Rfl:     meclizine (ANTIVERT) 25 mg tablet, Take 1 tablet (25 mg total) by mouth every 8 (eight) hours (Patient not taking: No sig reported), Disp: 30 tablet, Rfl: 0    QUEtiapine (SEROquel) 50 mg tablet, Take 1 tablet (50 mg total) by mouth daily at bedtime (Patient not taking: No sig reported), Disp: 90 tablet, Rfl: 1    topiramate (Topamax) 25 mg tablet, Take 2 tablets (50 mg total) by mouth 2 (two) times a day (Patient not taking: No sig reported), Disp: 160 tablet, Rfl: 4    Vitals:    06/17/22 1327   BP: 130/80   BP Location: Left arm   Patient Position: Sitting   Cuff Size: Standard   Pulse: 70   Resp: 18   Weight: 86 2 kg (190 lb)   Height: 5' 5" (1 651 m)       PHYSICAL EXAMINATION:  Gen: Awake, Alert, NAD   Head/eyes: AT/NC, pupils equal and round, Anicteric  ENT: mmm  Neck: Supple, No elevated JVP, trachea midline  Resp: CTA bilaterally no w/r/r  CV: RRR +S1, S2, No m/r/g  Abd: Soft, obese, NT/ND + BS  Ext: no LE edema bilaterally  Neuro: Follows commands, moves all extermities  Psych: Appropriate affect, happy mood, pleasant attitude, non-combative  Skin: warm; no rash, erythema or venous stasis changes on exposed skin    --------------------------------------------------------------------------------  TREADMILL STRESS  No results found for this or any previous visit      --------------------------------------------------------------------------------  NUCLEAR STRESS TEST: No results found for this or any previous visit  No results found for this or any previous visit       --------------------------------------------------------------------------------  CATH:  No results found for this or any previous visit     --------------------------------------------------------------------------------  ECHO:   No results found for this or any previous visit      No results found for this or any previous visit     --------------------------------------------------------------------------------  HOLTER  No results found for this or any previous visit  No results found for this or any previous visit     --------------------------------------------------------------------------------  CAROTIDS  No results found for this or any previous visit      --------------------------------------------------------------------------------  ECGs:  No results found for this visit on 06/17/22  Lab Results   Component Value Date    WBC 5 46 12/22/2021    HGB 13 1 12/22/2021    HCT 40 0 12/22/2021    MCV 92 12/22/2021     12/22/2021      Lab Results   Component Value Date    SODIUM 143 12/22/2021    K 3 9 12/22/2021     12/22/2021    CO2 27 12/22/2021    BUN 9 12/22/2021    CREATININE 0 70 12/22/2021    GLUC 83 12/22/2021    CALCIUM 8 7 12/22/2021      Lab Results   Component Value Date    HGBA1C 5 2 07/28/2015      Lab Results   Component Value Date    CHOL 195 03/24/2014     Lab Results   Component Value Date    HDL 52 03/24/2014     Lab Results   Component Value Date    LDLCALC 124 (H) 03/24/2014     Lab Results   Component Value Date    TRIG 96 03/24/2014     No results found for: [place], [place]   Lab Results   Component Value Date    INR 1 11 06/24/2021    INR 1 01 04/21/2018    PROTIME 14 1 06/24/2021    PROTIME 13 3 04/21/2018        1  Syncope and collapse  -     Compression Stocking    2  Obesity (BMI 30-39  9)        IMPRESSION:   Syncope likely vasovagal  o Event recorder w/ SR avg HR 77 bpm, rare APCs, rare atrial triplets, rare VPCs, rare ventricular couplets, nonsustained PSVT (x1) 4 beats at 107 bpm, April 2022  o Holter w/ SR avg HR 86 bpm, rare VPCs, rare APCs, December 2021   Exercise stress test is negative for myocardial ischemia, ET 8:10, 86% MPHR, 10 1 METs, March 2022   Obesity   LVEF 57%, trace MR/TR/VT, December 2021    PLAN:  It was a pleasure to see Evan Garber in the office today for follow-up CV evaluation  She is here today due to her history of syncope and near syncopal episodes  Since our last encounter, she has had no further episodes of syncope  She did have an episode of significant lightheadedness in the standing position  She notices that she has more prodromal symptoms including a feeling of sweating which she notices on her back and other symptoms of cool all over  She knows that she experience lightheadedness, nausea, flushing and tunnel vision prior to her episodes  Her symptoms have been most consistent with vasovagal response and she also gets some lightheadedness going from the sitting to standing position most consistent with orthostasis  She has been it liberalizing her fluid intake, but is still working on increasing her activity and she does walk on a regular basis, but has been having less formal exercise  Echocardiogram demonstrates normal left ventricular function without severe valvular disease  Stress test was found to be negative for myocardial ischemia event recorder showed sinus rhythm with rare atrial and ventricular ectopy  I have shared with her the stable news  She has no symptoms concerning for angina and no signs or symptoms of heart failure  She examines to be euvolemic in the office today  Blood pressure and heart rate are currently stable  Based on her clinical presentation, I have the following recommendations:    1  Recommend increasing physical activity level to build cardiovascular endurance  Would encourage 30 minutes a day, 5 days a week of moderate intensity activity  This will likely help with her symptoms  2  Would liberalize salt intake to help raise blood pressure  3  Encouraged her to continue to drink 2 5 L of water on a daily basis  Avoid dehydrating liquid such as alcohol, coffee and tea  4  Recommend staying out of the heat for prolonged periods of time to avoid dehydration  5   Encourage her to take breaks and to sit down from time to time to avoid prolonged standing as this can exacerbate her symptoms  6  I have prescribed compression stockings for her to help with symptoms  7  Should she have further symptoms despite liberalizing the salt, compression stockings, increased fluid intake and salt liberalization, would consider starting her on midodrine or fludrocortisone  8  Should she have any further loss of consciousness, recommend seeking immediate medical attention/dial 911  9  As her symptoms seem very obviously vasovagal and orthostatic and her prodrome has substantially increased that she understands when symptoms are coming on, it is reasonable for her to resume driving  10  We will follow up with her in 3 months to reassess her progress  As always, please do not hesitate to call with any questions  Portions of the record may have been created with voice recognition software  Occasional wrong word or "sound a like" substitutions may have occurred due to the inherent limitations of voice recognition software  Read the chart carefully and recognize, using context, where substitutions have occurred        Signed: Mari Patel DO, Renae Bramble

## 2022-08-10 ENCOUNTER — TELEPHONE (OUTPATIENT)
Dept: PSYCHIATRY | Facility: CLINIC | Age: 32
End: 2022-08-10

## 2022-08-10 NOTE — TELEPHONE ENCOUNTER
Behavorial Health Outpatient Intake Questions    Referred by: KATLIN Stroud    Please advised interviewee that they need to answer all questions truthfully to allow for best care and any misrepresentations of information may affect their ability to be seen at this clinic   => Was this discussed? Yes     [facility] -     Presenting Problem (in patient's words):     ADHD    Are there any developmental disabilities? ? If yes, can they speak to you on the phone? If they are too limited to speak to you on phone, refer out No    Are you taking any psychiatric medications? No    => If yes, who prescribes? If yes, are they injectable medications? Does the patient have a language barrier or hearing impairment? No    Have you been treated at [facility] by a therapist or a doctor in the past? If yes, who? No    Has the patient been hospitalized for mental health? Yes   If yes, how long ago was last hospitalization and where was it? 2008    Do you actively use alcohol or marijuana or illegal substances? If yes, what and how much - refer out to Drug and alcohol treatment if use is excessive or daily use of illegal substances No concerns of substance abuse are reported  Patient does drink alcohol in a social environment    Do you have a community treatment team or ? No    Legal History-     Does the patient have any history of arrests, custodial/alf time, or DUIs? No  If Yes-  1) What types of charges? 2) When were they last incarcerated? 3) Are they currently on parole or probation? Minor Child-    Who has custody of the child? Is there a custody agreement? If there is a custody agreement remind parent that they must bring a copy to the first appt or they will not be seen       Intake Team, please check with provider before scheduling if flags come up such as:  - complex case  - legal history (other than DUI)  - communication barrier concerns are present  - if, in your judgment, this needs further review    ACCEPTED as a patient Yes  => Appointment Date: 9/20/2022 at 12:45 with Dr Benjaman Kanner? No    Name of Insurance Co: [address] [place] [address] - [facility]  Insurance ID# [id number]  [id number] Phone #  If ins is primary or secondary  Primary  If patient is a minor, parents information such as Name, D  O B of guarantor

## 2022-08-25 ENCOUNTER — ANNUAL EXAM (OUTPATIENT)
Dept: OBGYN CLINIC | Facility: CLINIC | Age: 32
End: 2022-08-25
Payer: MEDICARE

## 2022-08-25 ENCOUNTER — OFFICE VISIT (OUTPATIENT)
Dept: PSYCHIATRY | Facility: CLINIC | Age: 32
End: 2022-08-25
Payer: COMMERCIAL

## 2022-08-25 VITALS
BODY MASS INDEX: 32.92 KG/M2 | DIASTOLIC BLOOD PRESSURE: 80 MMHG | HEIGHT: 65 IN | WEIGHT: 197.6 LBS | SYSTOLIC BLOOD PRESSURE: 112 MMHG

## 2022-08-25 VITALS — WEIGHT: 201 LBS | BODY MASS INDEX: 33.45 KG/M2

## 2022-08-25 DIAGNOSIS — R10.2 FEMALE PELVIC PAIN: ICD-10-CM

## 2022-08-25 DIAGNOSIS — F90.2 ADHD (ATTENTION DEFICIT HYPERACTIVITY DISORDER), COMBINED TYPE: ICD-10-CM

## 2022-08-25 DIAGNOSIS — N91.4 SECONDARY OLIGOMENORRHEA: ICD-10-CM

## 2022-08-25 DIAGNOSIS — F32.A DEPRESSION, UNSPECIFIED DEPRESSION TYPE: ICD-10-CM

## 2022-08-25 DIAGNOSIS — N64.4 BREAST PAIN, LEFT: ICD-10-CM

## 2022-08-25 DIAGNOSIS — Z11.51 SCREENING FOR HUMAN PAPILLOMAVIRUS (HPV): Primary | ICD-10-CM

## 2022-08-25 DIAGNOSIS — F41.1 GENERALIZED ANXIETY DISORDER: ICD-10-CM

## 2022-08-25 DIAGNOSIS — F90.2 ADHD (ATTENTION DEFICIT HYPERACTIVITY DISORDER), COMBINED TYPE: Primary | ICD-10-CM

## 2022-08-25 DIAGNOSIS — N80.9 ENDOMETRIOSIS: ICD-10-CM

## 2022-08-25 DIAGNOSIS — N63.21 MASS OF UPPER OUTER QUADRANT OF LEFT BREAST: ICD-10-CM

## 2022-08-25 DIAGNOSIS — Z12.4 SCREENING FOR CERVICAL CANCER: ICD-10-CM

## 2022-08-25 DIAGNOSIS — Z97.5 IUD CONTRACEPTION: ICD-10-CM

## 2022-08-25 PROCEDURE — 99214 OFFICE O/P EST MOD 30 MIN: CPT | Performed by: OBSTETRICS & GYNECOLOGY

## 2022-08-25 PROCEDURE — 90792 PSYCH DIAG EVAL W/MED SRVCS: CPT | Performed by: STUDENT IN AN ORGANIZED HEALTH CARE EDUCATION/TRAINING PROGRAM

## 2022-08-25 PROCEDURE — G0145 SCR C/V CYTO,THINLAYER,RESCR: HCPCS | Performed by: OBSTETRICS & GYNECOLOGY

## 2022-08-25 PROCEDURE — G0476 HPV COMBO ASSAY CA SCREEN: HCPCS | Performed by: OBSTETRICS & GYNECOLOGY

## 2022-08-25 RX ORDER — METHYLPHENIDATE HYDROCHLORIDE 18 MG/1
TABLET ORAL
Qty: 45 TABLET | Refills: 0
Start: 2022-08-25 | End: 2022-08-25 | Stop reason: SDUPTHER

## 2022-08-25 RX ORDER — METHYLPHENIDATE HYDROCHLORIDE 18 MG/1
18 TABLET ORAL DAILY
Qty: 30 TABLET | Refills: 0 | Status: SHIPPED | OUTPATIENT
Start: 2022-08-25 | End: 2022-10-03 | Stop reason: ALTCHOICE

## 2022-08-25 NOTE — Clinical Note
Irineo Suly has follow-up breast check on 9/29/22 in follow-up to breast finding today  She never did have follow-up to previous ultrasound with ovarian cyst   Can we schedule ultrasound at that visit as well?    If so, let us arrange for this and notify the patient   Thanks, Nitesh Caldera MD

## 2022-08-25 NOTE — PROGRESS NOTES
Assessment/Plan   Diagnoses and all orders for this visit:    Breast pain, left  -     Mammo diagnostic left w cad; Future  -     US breast left limited (diagnostic); Future    Mass of upper outer quadrant of left breast  -     Mammo diagnostic left w cad; Future  -     US breast left limited (diagnostic); Future    IUD contraception    Secondary oligomenorrhea    Endometriosis    Female pelvic pain    1  General exam-Pap smear done with HPV testing, self-breast awareness review  2  Mirena IUD-placed 10/30/18  IUD string noted at a length of 1 5 cm today  She is happy with this, she will continue  Aware of FDA recommendations up to 7 years duration  3  Status post laparoscopic bilateral salpingectomy  4  Secondary oligomenorrhea-notes rare bleeding at many month intervals  She will call or return with any issues  5  Left breast pain-this is her major concern today  She has noted about a 3 or 4 week history of pain in the left breast   She notes some kind of fullness, almost like a clogged milk duct"  On exam, fullness is noted at 0200 hours with some tenderness consistent with dense fibrocystic change with possible cyst noted  This is asymmetric compared with the corresponding tissue in the left breast   Discussed options  Recommend left diagnostic mammogram and left breast ultrasound  Follow-up 1 month time for breast check  6  History of endometriosis denies any complaints  7  History of right ovarian cyst-had 3 4 cm simple cyst noted right ovary at previous visit May 2021  Never did have follow-up  Exam was benign today  Would recommend follow-up ultrasound,  8  History of pelvic pain-no current concerns  Follow-up 1 month time for breast check or as needed  Subjective   Patient ID: Elia Fam is a 36 y o  female  Vitals:    08/25/22 0823   BP: 112/80     Patient was seen today for follow-up visit  Please see assessment plan for details        The following portions of the patient's history were reviewed and updated as appropriate: allergies, current medications, past family history, past medical history, past social history, past surgical history and problem list   Past Medical History:   Diagnosis Date    Abdominal pain     Anxiety     Asthma     as young child    [facility] disorder ([facility] [address] [facility]     Chronic pain disorder     right hip    Depression     Diarrhea     Endometrial disorder 2012    Endometriosis     Epigastric pain     Fatigue     History of COVID-19 1/3/2022    On 21    Irregular menses 2014    Morbid obesity ([facility] [address] [facility]     Nausea and vomiting     Yeast infection 2017     Past Surgical History:   Procedure Laterality Date    DIAGNOSTIC LAPAROSCOPY      DIAGNOSTIC LAPAROSCOPY      DILATION AND CURETTAGE OF UTERUS      TX COLONOSCOPY FLX DX W/COLLJ SPEC WHEN PFRMD N/A 2017    Procedure: EGD with bx AND COLONOSCOPY with bx;  Surgeon: Lisa Seth MD;  [facility]; Service: Gastroenterology    TX LAP,RMV  ADNEXAL STRUCTURE Bilateral 2021    Procedure: LAP SALPINGECTOMY;  fulgeration of endometriosis; Surgeon: Jeanmarie Pal MD;  [facility];  Service: Gynecology     OB History    Para Term  AB Living   5 3 3 0 2 3   SAB IAB Ectopic Multiple Live Births   0 2 0 0 3      # Outcome Date GA Lbr Gregor/2nd Weight Sex Delivery Anes PTL Lv   5 IAB 18           4 Term 16 40w2d 08:55 / 00:07 3590 g (7 lb 14 6 oz) F Vag-Spont None  SRINI   3 Term 14    F Vag-Spont None  SRINI   2 Term 01/20/10    M Vag-Spont None  SRINI      Complications: Other Excessive Bleeding   1 IAB                Current Outpatient Medications:     aspirin-acetaminophen-caffeine (EXCEDRIN MIGRAINE) 250-250-65 MG per tablet, Take 1 tablet by mouth every 6 (six) hours as needed for headaches As needed  , Disp: , Rfl:     hydrOXYzine HCL (ATARAX) 25 mg tablet, Take 1 tablet (25 mg total) by mouth every 6 (six) hours as needed for anxiety, Disp: 30 tablet, Rfl: 1    ibuprofen (MOTRIN) 800 mg tablet, TOME REMBERTO TABLETA POR V A ORAL CADA SEIS HORAS CUANDO SEA NECESARIO PAIN, Disp: , Rfl:     meclizine (ANTIVERT) 25 mg tablet, Take 1 tablet (25 mg total) by mouth every 8 (eight) hours, Disp: 30 tablet, Rfl: 0    topiramate (Topamax) 25 mg tablet, Take 2 tablets (50 mg total) by mouth 2 (two) times a day, Disp: 160 tablet, Rfl: 4    QUEtiapine (SEROquel) 50 mg tablet, Take 1 tablet (50 mg total) by mouth daily at bedtime (Patient not taking: No sig reported), Disp: 90 tablet, Rfl: 1  Allergies   Allergen Reactions    Shellfish-Derived Products - Food Allergy Anaphylaxis    Adhesive [Medical Tape] Swelling    Chlorhexidine Itching     burning    Other Swelling     All fruits     Social History     Socioeconomic History    Marital status: Single     Spouse name: None    Number of children: None    Years of education: None    Highest education level: None   Occupational History    None   Tobacco Use    Smoking status: Current Every Day Smoker     Packs/day: 0 50     Types: Cigarettes    Smokeless tobacco: Never Used    Tobacco comment: 1/2 pack/ week   Vaping Use    Vaping Use: Never used   Substance and Sexual Activity    Alcohol use: Yes     Comment: 2-3 glass/day wine or liquor    Drug use: Yes     Types: Marijuana     Comment: smokes daily    Sexual activity: Not Currently     Partners: Male     Birth control/protection: Inserts   Other Topics Concern    None   Social History Narrative    None     Social Determinants of Health     Financial Resource Strain: Not on file   Food Insecurity: Not on file   Transportation Needs: Not on file   Physical Activity: Not on file   Stress: Not on file   Social Connections: Not on file   Intimate Partner Violence: Not on file   Housing Stability: Not on file     Family History   Problem Relation Age of Onset    No Known Problems Mother     No Known Problems Father        Review of Systems Constitutional: Negative for chills, diaphoresis, fatigue and fever  Respiratory: Negative for apnea, cough, chest tightness, shortness of breath and wheezing  Cardiovascular: Negative for chest pain, palpitations and leg swelling  Gastrointestinal: Negative for abdominal distention, abdominal pain, anal bleeding, constipation, diarrhea, nausea, rectal pain and vomiting  Genitourinary: Negative for difficulty urinating, dyspareunia, dysuria, frequency, hematuria, menstrual problem, pelvic pain, urgency, vaginal bleeding, vaginal discharge and vaginal pain  Musculoskeletal: Negative for arthralgias, back pain and myalgias  Skin: Negative for color change and rash  Neurological: Negative for dizziness, syncope, light-headedness, numbness and headaches  Hematological: Negative for adenopathy  Does not bruise/bleed easily  Psychiatric/Behavioral: Negative for dysphoric mood and sleep disturbance  The patient is not nervous/anxious  Objective   Physical Exam  OBGyn Exam     Objective      /80 (BP Location: Left arm, Patient Position: Sitting, Cuff Size: Standard)   Ht 5' 5" (1 651 m)   Wt 89 6 kg (197 lb 9 6 oz)   LMP  (Exact Date)   BMI 32 88 kg/m²     General:   alert and oriented, in no acute distress   Neck: normal to inspection and palpation   Breast: Right breast without any masses or lesions or discharge  Fibrocystic changes noted upper outer quadrant  Left breast with prominent fibrocystic change in the upper outer quadrant with tenderness/possible fullness at 0200 hours  No warmth or erythema or discharge noted  Heart:    Lungs:    Abdomen: soft, non-tender, without masses or organomegaly   Vulva: normal   Vagina: Without erythema or lesions or discharge  Normal   Cervix: Without lesions or discharge or cervicitis  No Cervical motion tenderness    IUD string seen at a length of 1 5 cm   Uterus: top normal size, anteverted, non-tender   Adnexa: no mass, fullness, tenderness   Rectum: Deferred, patient declined    Psych:  Normal mood and affect   Skin:  Without obvious lesions   Eyes: symmetric, with normal movements and reactivity   Musculoskeletal:  Normal muscle tone and movements appreciated
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
## 2022-08-25 NOTE — PROGRESS NOTES
Will send script of Concerta supervising Dr Fraser Most  If no response to Concerta 18 mg after 2 weeks, would attempt to titrate Concerta to 36 mg daily

## 2022-08-25 NOTE — PATIENT INSTRUCTIONS
Take 1 tablet (18 mg total) by for 2 weeks  On 9/8/2022, if tolerating the medication well, begin taking 2 tablets (32 mg total)

## 2022-08-25 NOTE — BH TREATMENT PLAN
TREATMENT PLAN (Medication Management Only)        [facility]    Name and Date of Birth:  Robert Grace 36 y o  1990  Date of Treatment Plan: August 25, 2022  Diagnosis/Diagnoses:    1  ADHD (attention deficit hyperactivity disorder), combined type    2  Generalized anxiety disorder    3  Depression, unspecified depression type      Strengths/Personal Resources for Self-Care: supportive family, supportive friends, taking medications as prescribed, being resoureceful, self-reliance, willingness to work on problems, work skills  Area/Areas of need (in own words): anxiety, ADHD symptoms  1  Long Term Goal: continue improvement in ADHD symptoms  Target Date:6 months - 2/25/2023  Person/Persons responsible for completion of goal: Elise  2  Short Term Objective (s) - How will we reach this goal?:   A  Provider new recommended medication/dosage changes and/or continue medication(s): continue current medications as prescribed  B  anxiety improvment  Loyd Carrington all scheduled appointments  Target Date:4 weeks - 9/22/2022  Person/Persons Responsible for Completion of Goal: Elise  Progress Towards Goals: starting treatment  Treatment Modality: medication education at every visit  Review due 180 days from date of this plan: 1 month - 9/25/2022  Expected length of service: ongoing treatment  My Physician/PA/NP and I have developed this plan together and I agree to work on the goals and objectives  I understand the treatment goals that were developed for my treatment

## 2022-08-26 ENCOUNTER — TELEPHONE (OUTPATIENT)
Dept: PSYCHIATRY | Facility: CLINIC | Age: 32
End: 2022-08-26

## 2022-08-26 LAB
HPV HR 12 DNA CVX QL NAA+PROBE: NEGATIVE
HPV16 DNA CVX QL NAA+PROBE: NEGATIVE
HPV18 DNA CVX QL NAA+PROBE: NEGATIVE

## 2022-08-26 NOTE — TELEPHONE ENCOUNTER
Retrieved the clinical questions from kim and completed the Methylphenidate 18 mg tab ER tab PA, marking it as "EXPEDITED "    Spoke to Serafin reviewing the PA request and process for the Methylphenidate 18 mg ER tabs  Will call her back when a decision is reached  For your review

## 2022-08-29 NOTE — TELEPHONE ENCOUNTER
Received a faxed approval for methylphen ER 18 mg, 24 hr tab daily, 8/26/22 - 8/26/23  Reviewed renewal with pharmacy  Spoke with [employer]  Reviewed approval, including dates and if the dose changes she can contact nursing to request a new PA to be submitted

## 2022-08-31 LAB
LAB AP GYN PRIMARY INTERPRETATION: NORMAL
Lab: NORMAL

## 2022-09-29 ENCOUNTER — OFFICE VISIT (OUTPATIENT)
Dept: GYNECOLOGY | Facility: CLINIC | Age: 32
End: 2022-09-29
Payer: MEDICARE

## 2022-09-29 ENCOUNTER — ULTRASOUND (OUTPATIENT)
Dept: GYNECOLOGY | Facility: CLINIC | Age: 32
End: 2022-09-29
Payer: MEDICARE

## 2022-09-29 VITALS
HEIGHT: 65 IN | WEIGHT: 199.4 LBS | BODY MASS INDEX: 33.22 KG/M2 | DIASTOLIC BLOOD PRESSURE: 80 MMHG | SYSTOLIC BLOOD PRESSURE: 118 MMHG

## 2022-09-29 DIAGNOSIS — N64.4 BREAST PAIN: Primary | ICD-10-CM

## 2022-09-29 DIAGNOSIS — B37.31 VAGINAL CANDIDIASIS: ICD-10-CM

## 2022-09-29 DIAGNOSIS — Z11.3 SCREEN FOR STD (SEXUALLY TRANSMITTED DISEASE): ICD-10-CM

## 2022-09-29 DIAGNOSIS — Z20.2 POSSIBLE EXPOSURE TO STD: ICD-10-CM

## 2022-09-29 DIAGNOSIS — Z97.5 IUD CONTRACEPTION: ICD-10-CM

## 2022-09-29 DIAGNOSIS — N83.201 RIGHT OVARIAN CYST: Primary | ICD-10-CM

## 2022-09-29 LAB
BV WHIFF TEST VAG QL: NEGATIVE
CLUE CELLS SPEC QL WET PREP: NEGATIVE
PH SMN: ABNORMAL [PH]
SL AMB POCT WET MOUNT: YES
T VAGINALIS VAG QL WET PREP: NEGATIVE
YEAST VAG QL WET PREP: POSITIVE

## 2022-09-29 PROCEDURE — 99214 OFFICE O/P EST MOD 30 MIN: CPT | Performed by: OBSTETRICS & GYNECOLOGY

## 2022-09-29 PROCEDURE — 87491 CHLMYD TRACH DNA AMP PROBE: CPT | Performed by: OBSTETRICS & GYNECOLOGY

## 2022-09-29 PROCEDURE — 87591 N.GONORRHOEAE DNA AMP PROB: CPT | Performed by: OBSTETRICS & GYNECOLOGY

## 2022-09-29 PROCEDURE — 87210 SMEAR WET MOUNT SALINE/INK: CPT | Performed by: OBSTETRICS & GYNECOLOGY

## 2022-09-29 PROCEDURE — 76830 TRANSVAGINAL US NON-OB: CPT | Performed by: OBSTETRICS & GYNECOLOGY

## 2022-09-29 RX ORDER — FLUCONAZOLE 150 MG/1
150 TABLET ORAL ONCE
Qty: 1 TABLET | Refills: 0 | Status: SHIPPED | OUTPATIENT
Start: 2022-09-29 | End: 2022-09-29

## 2022-09-29 NOTE — PROGRESS NOTES
AMB US Pelvic Non OB    Date/Time: 9/29/2022 7:26 AM  Performed by: Gage Dawson  Authorized by: Yared Rosales MD   Universal Protocol:  Patient identity confirmed: verbally with patient      Procedure details:     Indications: ovarian cysts      Technique:  Transvaginal US, Non-OB    Position: lithotomy exam    Uterine findings:     Length (cm): 8 05    Height (cm):  4 82    Width (cm):  5 25    Endometrial stripe: identified      Endometrium thickness (mm):  5  Left ovary findings:     Left ovary:  Visualized    Length (cm): 4 26    Height (cm): 2 47    Width (cm): 2 57  Right ovary findings:     Right ovary:  Visualized    Length (cm): 4 04    Height (cm): 2 32    Width (cm): 2 76  Other findings:     Free pelvic fluid: not identified      Free peritoneal fluid: not identified    Post-Procedure Details:     Impression:   Anteverted uterus demonstrates an IUD in good position within the endometrium  Otherwise, the uterus and bilateral ovaries appear within normal limits  No free fluid  Tolerance: Tolerated well, no immediate complications    Complications: no complications    Additional Procedure Comments:      [employer] F8 E8C-RS transvaginal transducer Serial # [id number] was used to perform the examination today and subsequently followed with high level disinfection utilizing Trophon EPR procedure  Ultrasound performed at:     [id number] [address] [address] [facility]  Phone:  [phone]  Fax:  [phone]

## 2022-09-29 NOTE — PROGRESS NOTES
Assessment/Plan   Diagnoses and all orders for this visit:    Breast pain    Screen for STD (sexually transmitted disease)  -     Chlamydia/GC amplified DNA by PCR    IUD contraception    Possible exposure to STD  -     HIV 1/2 Antigen/Antibody (4th Generation) w Reflex [facility]; Future  -     RPR; Future  -     Hepatitis panel, acute; Future    Vaginal candidiasis  -     POCT wet mount  -     fluconazole (DIFLUCAN) 150 mg tablet; Take 1 tablet (150 mg total) by mouth once for 1 dose    1  Left breast pain-patient never did get imaging study  Her pain has since resolved  Exam is benign today  Had given request for left diagnostic mammogram and left breast ultrasound, encouraged to get this done particularly if any persistence or recurrence of symptoms  She may hold on the testing given the lack of symptoms at this time  2  Mirena IUD-placed 10/30/18  IUD string was noted at a length of 1 5 cm   3  Status post laparoscopic bilateral salpingectomy  4  Yeast infection-noted on exam and wet prep  Patient was counseled about the findings  Electronic prescription for fluconazole 150 mg p o  X1 sent a local pharmacy  5  History of endometriosis/pelvic pain-no current concerns  6  Resolved History of right ovarian cyst-3 4 cm simple cyst noted right ovary from previous ultrasound May 2021  Now resolved on ultrasound today  Patient was relieved to hear this  7  STD testing-had intercourse recently  GC/chlamydia done today  Laboratory sheet given for HIV, RPR, hepatitis panel  8   Other-patient did seem distracted today  I was concerned about her minimal description regarding her STD risk  However, she did not wish to share any details about this with me  Strongly encouraged her to call return for follow-up as needed  Would suggest counseling as needed  Subjective   Patient ID: Robert Grace is a 29 y o  female      Vitals:    09/29/22 1127   BP: 118/80     Patient seen today for follow-up visit and ultrasound  Please see assessment plan and ultrasound note for details  The following portions of the patient's history were reviewed and updated as appropriate: allergies, current medications, past family history, past medical history, past social history, past surgical history and problem list   Past Medical History:   Diagnosis Date    Abdominal pain     Anxiety     Asthma     as young child    Bipolar disorder ([facility] [address] )     Chronic pain disorder     right hip    Depression     Diarrhea     Endometrial disorder 2012    Endometriosis     Epigastric pain     Fatigue     History of COVID-19 1/3/2022    On 21    Irregular menses 2014    Morbid obesity ([facility] [address] )     Nausea and vomiting     Yeast infection 2017     Past Surgical History:   Procedure Laterality Date    DIAGNOSTIC LAPAROSCOPY      DIAGNOSTIC LAPAROSCOPY      DILATION AND CURETTAGE OF UTERUS      HI COLONOSCOPY FLX DX W/COLLJ SPEC WHEN PFRMD N/A 2017    Procedure: EGD with bx AND COLONOSCOPY with bx;  Surgeon: Salvador France MD;  Location: [facility]; Service: Gastroenterology    HI LAP,RMV  ADNEXAL STRUCTURE Bilateral 2021    Procedure: LAP SALPINGECTOMY;  fulgeration of endometriosis; Surgeon: Shaheen Sorensen MD;  Location: [facility];  Service: Gynecology     OB History    Para Term  AB Living   5 3 3 0 2 3   SAB IAB Ectopic Multiple Live Births   0 2 0 0 3      # Outcome Date GA Lbr Gregor/2nd Weight Sex Delivery Anes PTL Lv   5 IAB 18           4 Term 16 40w2d 08:55 / 00:07 3590 g (7 lb 14 6 oz) F Vag-Spont None  [facility]INI   3 Term 14    F Vag-Spont None  [facility]   2 Term 01/20/10    M Vag-Spont None  [facility]      Complications: Other Excessive Bleeding   1 IAB                Current Outpatient Medications:     aspirin-acetaminophen-caffeine (EXCEDRIN MIGRAINE) 250-250-65 MG per tablet, Take 1 tablet by mouth every 6 (six) hours as needed for headaches As needed  , Disp: , Rfl:     fluconazole (DIFLUCAN) 150 mg tablet, Take 1 tablet (150 mg total) by mouth once for 1 dose, Disp: 1 tablet, Rfl: 0    ibuprofen (MOTRIN) 800 mg tablet, TOME REMBERTO TABLETA POR V A ORAL CADA SEIS HORAS CUANDO SEA NECESARIO PAIN, Disp: , Rfl:     meclizine (ANTIVERT) 25 mg tablet, Take 1 tablet (25 mg total) by mouth every 8 (eight) hours, Disp: 30 tablet, Rfl: 0    methylphenidate (Concerta) 18 mg ER tablet, Take 1 tablet (18 mg total) by mouth daily Max Daily Amount: 18 mg, Disp: 30 tablet, Rfl: 0    topiramate (Topamax) 25 mg tablet, Take 2 tablets (50 mg total) by mouth 2 (two) times a day, Disp: 160 tablet, Rfl: 4  Allergies   Allergen Reactions    Shellfish-Derived Products - Food Allergy Anaphylaxis     Rash, shortness of breath, chest pain    hosp a few times for this    Adhesive [Medical Tape] Swelling    Chlorhexidine Itching     burning    Other Swelling     All fruits     Social History     Socioeconomic History    Marital status: Single     Spouse name: None    Number of children: None    Years of education: None    Highest education level: None   Occupational History    None   Tobacco Use    Smoking status: Current Every Day Smoker     Packs/day: 0 50     Types: Cigarettes    Smokeless tobacco: Never Used    Tobacco comment: 1/2 pack/ week   Vaping Use    Vaping Use: Never used   Substance and Sexual Activity    Alcohol use: Yes     Comment: 2-3 glass/day wine or liquor    Drug use: Yes     Types: Marijuana     Comment: smokes daily    Sexual activity: Not Currently     Partners: Male     Birth control/protection: I U D     Other Topics Concern    None   Social History Narrative    None     Social Determinants of Health     Financial Resource Strain: Not on file   Food Insecurity: Not on file   Transportation Needs: Not on file   Physical Activity: Not on file   Stress: Not on file   Social Connections: Not on file   Intimate Partner Violence: Not on file   Housing Stability: Not on file Family History   Problem Relation Age of Onset    No Known Problems Mother     No Known Problems Father        Review of Systems   Constitutional: Negative for chills, diaphoresis, fatigue and fever  Respiratory: Negative for apnea, cough, chest tightness, shortness of breath and wheezing  Cardiovascular: Negative for chest pain, palpitations and leg swelling  Gastrointestinal: Negative for abdominal distention, abdominal pain, anal bleeding, constipation, diarrhea, nausea, rectal pain and vomiting  Genitourinary: Negative for difficulty urinating, dyspareunia, dysuria, frequency, hematuria, menstrual problem, pelvic pain, urgency, vaginal bleeding, vaginal discharge and vaginal pain  Musculoskeletal: Negative for arthralgias, back pain and myalgias  Skin: Negative for color change and rash  Neurological: Negative for dizziness, syncope, light-headedness, numbness and headaches  Hematological: Negative for adenopathy  Does not bruise/bleed easily  Psychiatric/Behavioral: Negative for dysphoric mood and sleep disturbance  The patient is not nervous/anxious  Objective   Physical Exam  OBGyn Exam     Objective      /80 (BP Location: Right arm, Patient Position: Sitting)   Ht 5' 5" (1 651 m)   Wt 90 4 kg (199 lb 6 4 oz)   BMI 33 18 kg/m²     General:   alert and oriented, in no acute distress   Neck: normal to inspection and palpation   Breast: normal appearance, no masses or tenderness   Heart:    Lungs:    Abdomen: soft, non-tender, without masses or organomegaly   Vulva: normal   Vagina: Small amount of white discharge, without erythema or lesions  Cervix: Small amount of white discharge, without lesions or cervicitis    No Cervical motion tenderness   Uterus: top normal size, anteverted, non-tender   Adnexa: no mass, fullness, tenderness   Rectum: deferred    Psych:  Normal mood and affect   Skin:  Without obvious lesions   Eyes: symmetric, with normal movements and reactivity   Musculoskeletal:  Normal muscle tone and movements appreciated

## 2022-09-30 LAB
C TRACH DNA SPEC QL NAA+PROBE: NEGATIVE
N GONORRHOEA DNA SPEC QL NAA+PROBE: NEGATIVE
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
## 2022-10-03 ENCOUNTER — OFFICE VISIT (OUTPATIENT)
Dept: PSYCHIATRY | Facility: CLINIC | Age: 32
End: 2022-10-03
Payer: COMMERCIAL

## 2022-10-03 DIAGNOSIS — F41.1 GENERALIZED ANXIETY DISORDER: ICD-10-CM

## 2022-10-03 DIAGNOSIS — F90.2 ADHD (ATTENTION DEFICIT HYPERACTIVITY DISORDER), COMBINED TYPE: Primary | ICD-10-CM

## 2022-10-03 DIAGNOSIS — F32.A DEPRESSION, UNSPECIFIED DEPRESSION TYPE: ICD-10-CM

## 2022-10-03 PROCEDURE — 99214 OFFICE O/P EST MOD 30 MIN: CPT | Performed by: PSYCHIATRY & NEUROLOGY

## 2022-10-03 RX ORDER — DEXTROAMPHETAMINE SACCHARATE, AMPHETAMINE ASPARTATE MONOHYDRATE, DEXTROAMPHETAMINE SULFATE AND AMPHETAMINE SULFATE 1.25; 1.25; 1.25; 1.25 MG/1; MG/1; MG/1; MG/1
CAPSULE, EXTENDED RELEASE ORAL
Qty: 64 CAPSULE | Refills: 0
Start: 2022-10-03 | End: 2022-10-04 | Stop reason: SDUPTHER

## 2022-10-03 RX ORDER — LAMOTRIGINE 25 MG/1
TABLET ORAL
Qty: 74 TABLET | Refills: 0 | Status: SHIPPED | OUTPATIENT
Start: 2022-10-03 | End: 2022-11-16

## 2022-10-03 NOTE — PATIENT INSTRUCTIONS
Stop taking Concerta    Start taking Adderall 5 mg daily for 4 days followed by 10 mg daily thereafter    Take lamotrigine (Lamictal) 25 mg daily (1 tablet) for two weeks, then take 50 mg daily (2 tablets)      Please present for your previously scheduled appointment approximately 15 minutes prior to appointment time  If you are running late or are unable to attend your appointment, please call our [place] office at [phone] or our [place] office at [phone] if applicable to notify the office of your absence  If you are in psychological crisis including not limited to suicidal/homicidal thoughts or thoughts of self-injury, do not hesitate to call/contact your [facility] hotline (see below) or attend to the nearest emergency department    [place] Crisis: [facility] Crisis: [phone]  [place] [address] Crisis: [address] Crisis: [address] Crisis: [address] Crisis: [address] Crisis: [phone]  National Suicide Prevention Hotline: 4-518.968.7230

## 2022-10-04 ENCOUNTER — TELEPHONE (OUTPATIENT)
Dept: PSYCHIATRY | Facility: CLINIC | Age: 32
End: 2022-10-04

## 2022-10-04 DIAGNOSIS — F90.2 ADHD (ATTENTION DEFICIT HYPERACTIVITY DISORDER), COMBINED TYPE: ICD-10-CM

## 2022-10-04 RX ORDER — DEXTROAMPHETAMINE SACCHARATE, AMPHETAMINE ASPARTATE MONOHYDRATE, DEXTROAMPHETAMINE SULFATE AND AMPHETAMINE SULFATE 1.25; 1.25; 1.25; 1.25 MG/1; MG/1; MG/1; MG/1
CAPSULE, EXTENDED RELEASE ORAL
Qty: 64 CAPSULE | Refills: 0 | Status: SHIPPED | OUTPATIENT
Start: 2022-10-04 | End: 2022-11-07

## 2022-10-04 NOTE — TELEPHONE ENCOUNTER
Lizz Priest on nursing line   She stated the pharmacy got the Lamictal prescription but NOT the Adderall one      -Please re-send Adderall prescription    Lela Schulte- [phone]

## 2022-10-04 NOTE — TELEPHONE ENCOUNTER
Alberto Byers called and    Pharmacy let her know her Amphet-Dextroamphet will need a PA    Please call Alberto Byers- [phone]

## 2022-10-04 NOTE — TELEPHONE ENCOUNTER
Pt  Called in inquiring as to why one medication was refilled and the other one was not yet received by the pharmacy

## 2022-10-07 ENCOUNTER — OFFICE VISIT (OUTPATIENT)
Dept: NEUROLOGY | Facility: CLINIC | Age: 32
End: 2022-10-07
Payer: MEDICARE

## 2022-10-07 VITALS
DIASTOLIC BLOOD PRESSURE: 63 MMHG | BODY MASS INDEX: 32.88 KG/M2 | HEART RATE: 78 BPM | SYSTOLIC BLOOD PRESSURE: 110 MMHG | WEIGHT: 197.6 LBS

## 2022-10-07 DIAGNOSIS — G43.909 MIGRAINES: ICD-10-CM

## 2022-10-07 DIAGNOSIS — R55 SYNCOPE, UNSPECIFIED SYNCOPE TYPE: Primary | ICD-10-CM

## 2022-10-07 PROCEDURE — 99213 OFFICE O/P EST LOW 20 MIN: CPT | Performed by: STUDENT IN AN ORGANIZED HEALTH CARE EDUCATION/TRAINING PROGRAM

## 2022-10-07 NOTE — PROGRESS NOTES
[facility] -   Follow up appointment    Impression/Plan    Royer Farah is a 29 y o  right handed female with PMH of anxiety, depression, headaches and syncopal spells presenting to the [facility] for follow up of headaches and syncopal spells  She no longer has episodes of near syncope or headaches  Lifestyle improvements are likely to be the reasons why she feels better  Plan outlined below:    #Headaches  - Continue with better diet, water intake and exercise    #Near syncope  - Nothing to do now in remission    - Follow up as needed    Diagnoses and all orders for this visit:    Syncope, unspecified syncope type    Migraines        Subjective    Royer Farah is a 29 y o  right handed female with PMH of anxiety, depression, headaches and syncopal spells presenting to the [facility] for follow up of headaches and syncopal spells      For review:     The patient has a longstanding history of syncopal and near syncopal episodes      Per cardiology note: These episodes have occurred since she was young   The symptoms begin with significant lightheadedness   She then breaks out in to nausea with diaphoresis, has tunnel vision and then tries to get to the ground in a position that makes her feel comfortable   Unfortunately, the symptoms have been coming on fairly quickly   Overall, she is usually able to get herself to the ground, but at times the symptoms come on so fast that is overwhelming        Most recently, she had an episode in November 2021 where she was seated and had significant lightheadedness and obtaining her head multiple times against the ground  This was different from other events  She said that she got extremely sweaty, gets tunnel vision, and just laid there  It was slightly different because she she was aware of what was happening while her head was hitting the floor while face down leading to facial injury   She was able to talk while it was happening screaming for help  She felt that she couldn't control her body      The last typical syncopal/presyncopal event was in January       She saw cardiology on 12/23/22 for the first time with these complaints  EKG was unremarkable  Holter monitor from 12/2021 showed rare APV and VPCs  Echocardiogram showed normal LV function and no valvular disease  Recommendations at that last visit was to perform exercise stress test, 2 week event recorder, and lifestyle interventions to reduce risk of vasovagal syncope      She was first seen by me in 2/25/2022  While I felt that most of her syncopal events sound like vasovagal, the event in November was odd due to the retained awareness and uncontrolled movements of her body  This was also atypical for epileptic seizure  Panic attacks could have overlapping symptoms but she reports that mood is well controlled  I recommended a routine EEG and Tilt table test      She also has chronic daily headache/medication overuse headache due to BID Excedrin use  The plan was to topamax 50 BID, decrease excedrin use and lifestyle modification    The patient was last seen in clinic on 6/1/2022  She still cassidy that she has a hard time standing for too long in the heat  She will get dizzy and nauseated  She hasn't syncopized, but if she lies down and catches it, nothing will progress  There were no more events like the one where she hit her head on the ground (Nov 2021)     The patient stopped taking the topamax because she didn't like how it made her feel after taking it by 1 5-2 months  She has only needed to take excedrin twice  The headaches are occipital and shoot forward  She has only one bad headache a week and has 3-4 dull headaches rated 5/10 in severity a week  She felt that headaches were slightly better since February but not by much       Routine EEG on 3/23/2022 was normal  Tilt table was not completed   She has completed a cardiac stress test which was normal and a 14 day holter monitor which didn't find any clinically significant arrhythmias  Plan at that time was consider a more natural approach to HA prevention and continue to use Excedrin PRN  There was no further neurological work up needed for her syncopal events  Interval history:    Since last seen, the patient has been doing really well  She no longer has headaches  She has made a lot of changes to her lifestyle: More water, diet changes, more exercise  She never took the supplements she recommended  There has also been no episodes of syncope or near syncope    History Reviewed: The following were reviewed and updated as appropriate: allergies, current medications, past family history, past medical history, past social history, past surgical history and problem list    ROS:  Constitutional: Negative  Negative for appetite change and fever  HENT: Negative  Negative for hearing loss, tinnitus, trouble swallowing and voice change  Eyes: Negative for photophobia, pain and visual disturbance  Respiratory: Negative  Negative for shortness of breath  Cardiovascular: Negative  Negative for palpitations  Gastrointestinal: Negative  Negative for nausea and vomiting  Endocrine: Negative  Negative for cold intolerance  Genitourinary: Negative  Negative for dysuria, frequency and urgency  Musculoskeletal: Negative for gait problem, myalgias and neck pain  Skin: Negative  Negative for rash  Neurological: Negative for dizziness, tremors, seizures, syncope, facial asymmetry, speech difficulty, weakness, light-headedness, numbness and headaches  Hematological: Negative  Does not bruise/bleed easily  Psychiatric/Behavioral: Negative  Negative for confusion, hallucinations and sleep disturbance  All other systems reviewed and are negative      Objective    /63   Pulse 78   Wt 89 6 kg (197 lb 9 6 oz)   BMI 32 88 kg/m²      General Exam  General: well developed, no acute distress  HEENT: mucous membranes moist, anicteric sclera  Neck: supple, good ROM  Extremities: no clubbing, cyanosis or edema  Skin: no rash on visible skin  Neurological Exam  Mental Status: awake, alert, and fully oriented to person, place, time, and situation  Attention and memory intact  Fund of knowledge is appropriate for age and education  There is no neglect  Language: fluency, and comprehension normal        Cranial Nerves: Pupils equal and reactive to light  Visual fields full to confrontation  Extraocular motions intact with full versions, normal pursuits and saccades  Facial strength full and symmetric  Hearing intact to voice  Tongue protrudes to midline  Palate elevates symmetrically  Speech clear without notable dysarthria  Motor: Normal bulk and tone  No pronator drift  Strength is 5/5 proximally and distally in all 4 extremities  No involuntary movements  Sensory: Sensation intact to light touch distally in all extremities  Coordination: Normal finger-to-nose  Normal rapid alternating movements  Station and gait: Casual and tandem gait normal  Normal Romberg  Reflexes: Reflexes 2+ throughout and symmetric        [facility]  [facility] [address] [facility]

## 2022-10-07 NOTE — PROGRESS NOTES
Review of Systems   Constitutional: Negative  Negative for appetite change and fever  HENT: Negative  Negative for hearing loss, tinnitus, trouble swallowing and voice change  Eyes: Negative for photophobia, pain and visual disturbance  Respiratory: Negative  Negative for shortness of breath  Cardiovascular: Negative  Negative for palpitations  Gastrointestinal: Negative  Negative for nausea and vomiting  Endocrine: Negative  Negative for cold intolerance  Genitourinary: Negative  Negative for dysuria, frequency and urgency  Musculoskeletal: Negative for gait problem, myalgias and neck pain  Skin: Negative  Negative for rash  Neurological: Negative for dizziness, tremors, seizures, syncope, facial asymmetry, speech difficulty, weakness, light-headedness, numbness and headaches  Hematological: Negative  Does not bruise/bleed easily  Psychiatric/Behavioral: Negative  Negative for confusion, hallucinations and sleep disturbance  All other systems reviewed and are negative

## 2022-10-07 NOTE — TELEPHONE ENCOUNTER
Migue Roche left an additional message stating she has not heard anything about her PA that Is needed and it has been 3 days  She is wondering if it was approved or not?      Migue Roche- [phone]

## 2022-10-12 ENCOUNTER — TELEPHONE (OUTPATIENT)
Dept: PSYCHIATRY | Facility: CLINIC | Age: 32
End: 2022-10-12

## 2022-10-13 ENCOUNTER — TELEPHONE (OUTPATIENT)
Dept: PSYCHIATRY | Facility: CLINIC | Age: 32
End: 2022-10-13

## 2022-10-13 NOTE — TELEPHONE ENCOUNTER
----- Message from Lupe Greer MD sent at 10/13/2022  8:30 AM EDT -----  Good morning! Last evening I discussed with patient the prior authorization for this patient's Adderall XR 5 mg medication  I see the prescription for Adderall was recent again on 10/4 by Dr Cristopher Yu  She stated to me that she is not able to  the medication from the pharmacy  Can we double check that the medication is approved for her?     Thank you,  Dr Roderick Orlando

## 2022-10-14 NOTE — TELEPHONE ENCOUNTER
Retrieved and completed the clinical questions  Completed the Amphet-dextroamphet 5 mg ER cap PA via [employer] and faxed the latest office visit notes to [facility], marking it as "URGENT "    Left a VM for Daniele Briceño reviewing the Amphet-destroamphet 5 mg ER cap PA request and process  Will call her back when a decision is reached  For your review

## 2022-10-14 NOTE — TELEPHONE ENCOUNTER
Initiated the Amphet-dextroamphet 5 mg ER cap PA via [facility] for [facility], but must check back for the clinical questions  Will revisit kim GUERRA

## 2022-10-19 ENCOUNTER — TELEPHONE (OUTPATIENT)
Dept: PSYCHIATRY | Facility: CLINIC | Age: 32
End: 2022-10-19

## 2022-10-19 NOTE — TELEPHONE ENCOUNTER
Patient requested a call from Dr Reather Prader to discuss her medications  She can be reached at [phone]

## 2022-10-21 NOTE — TELEPHONE ENCOUNTER
Noticed in navinet that the Amphet-dextroamphet 5 mg ER caps was approved on 10/14/22, with no expiration date given, and see in MyChart that [facility] and the pharmacy were made aware of the approval     Will scan PA to [employer]  For your review

## 2022-11-02 ENCOUNTER — OFFICE VISIT (OUTPATIENT)
Dept: PSYCHIATRY | Facility: CLINIC | Age: 32
End: 2022-11-02

## 2022-11-02 VITALS — WEIGHT: 194 LBS | BODY MASS INDEX: 32.28 KG/M2

## 2022-11-02 DIAGNOSIS — F32.A DEPRESSION, UNSPECIFIED DEPRESSION TYPE: ICD-10-CM

## 2022-11-02 DIAGNOSIS — F41.1 GENERALIZED ANXIETY DISORDER: ICD-10-CM

## 2022-11-02 DIAGNOSIS — F90.2 ADHD (ATTENTION DEFICIT HYPERACTIVITY DISORDER), COMBINED TYPE: ICD-10-CM

## 2022-11-02 RX ORDER — LAMOTRIGINE 25 MG/1
TABLET ORAL
Qty: 162 TABLET | Refills: 0 | Status: SHIPPED | OUTPATIENT
Start: 2022-11-02 | End: 2022-11-03

## 2022-11-02 RX ORDER — DEXTROAMPHETAMINE SACCHARATE, AMPHETAMINE ASPARTATE MONOHYDRATE, DEXTROAMPHETAMINE SULFATE AND AMPHETAMINE SULFATE 1.25; 1.25; 1.25; 1.25 MG/1; MG/1; MG/1; MG/1
10 CAPSULE, EXTENDED RELEASE ORAL EVERY MORNING
Qty: 60 CAPSULE | Refills: 0
Start: 2022-11-02 | End: 2022-12-02
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
## 2022-11-03 ENCOUNTER — TELEPHONE (OUTPATIENT)
Dept: PSYCHIATRY | Facility: CLINIC | Age: 32
End: 2022-11-03

## 2022-11-03 RX ORDER — LAMOTRIGINE 100 MG/1
100 TABLET, ORALLY DISINTEGRATING ORAL DAILY
Qty: 30 TABLET | Refills: 0 | Status: SHIPPED | OUTPATIENT
Start: 2022-11-18 | End: 2022-11-10 | Stop reason: ALTCHOICE

## 2022-11-03 RX ORDER — LAMOTRIGINE 25 MG/1
75 TABLET, CHEWABLE ORAL DAILY
Qty: 42 TABLET | Refills: 0 | Status: SHIPPED | OUTPATIENT
Start: 2022-11-03 | End: 2022-11-04 | Stop reason: ALTCHOICE

## 2022-11-03 NOTE — TELEPHONE ENCOUNTER
Would you be willing to re-send PA for Lamictal with titration and then one prescription for Lamictal 100 mg for insurance purposes  Thanks!

## 2022-11-04 DIAGNOSIS — F41.1 GENERALIZED ANXIETY DISORDER: ICD-10-CM

## 2022-11-04 DIAGNOSIS — F32.A DEPRESSION, UNSPECIFIED DEPRESSION TYPE: Primary | ICD-10-CM

## 2022-11-04 DIAGNOSIS — F90.2 ADHD (ATTENTION DEFICIT HYPERACTIVITY DISORDER), COMBINED TYPE: ICD-10-CM

## 2022-11-04 RX ORDER — LAMOTRIGINE 25 MG/1
75 TABLET, ORALLY DISINTEGRATING ORAL DAILY
Qty: 42 TABLET | Refills: 0 | Status: SHIPPED | OUTPATIENT
Start: 2022-11-04 | End: 2022-11-08 | Stop reason: ALTCHOICE

## 2022-11-04 NOTE — TELEPHONE ENCOUNTER
The Lamotrigine 25 mg was sent for chewables  Not sure you wanted that  The Lamotrigine 100 mg is ODT  Is this correct? Otherwise that looks good       Please review

## 2022-11-07 ENCOUNTER — TELEPHONE (OUTPATIENT)
Dept: PSYCHIATRY | Facility: CLINIC | Age: 32
End: 2022-11-07

## 2022-11-07 NOTE — TELEPHONE ENCOUNTER
Received a PA request for the Lamotrigine 25 mg  dispersible tablets  Did you want the DTs?  Please advise

## 2022-11-07 NOTE — TELEPHONE ENCOUNTER
Received fax from pharmacy requesting completion of an already initiated Pa for Lamotrigine 25 mg Dispersible Tablets  Completed Pa request for Lamotrigine 25 mg DT via navinet   Awaiting clinical questions in [facility], [facility]  Completed clinical questions and sent to [facility] with Hiral Le and made aware of PA needed   She was appreciative

## 2022-11-08 DIAGNOSIS — F41.1 GENERALIZED ANXIETY DISORDER: ICD-10-CM

## 2022-11-08 DIAGNOSIS — F32.A DEPRESSION, UNSPECIFIED DEPRESSION TYPE: Primary | ICD-10-CM

## 2022-11-08 RX ORDER — LAMOTRIGINE 25 MG/1
75 TABLET ORAL DAILY
Qty: 42 TABLET | Refills: 0 | Status: SHIPPED | OUTPATIENT
Start: 2022-11-08 | End: 2022-11-10 | Stop reason: ALTCHOICE

## 2022-11-08 NOTE — TELEPHONE ENCOUNTER
Received denial for lamotrigine 25 mg ODT  Must be able to take IR tablets and show patient is unable to take IR  Requesting physician can call for peer to peer with [facility] Physician Advisor:  [phone]  Will need follow up with [facility] after review

## 2022-11-09 NOTE — TELEPHONE ENCOUNTER
Follow up amber made to pharmacy  Insurance will not cover 3 tabs of 25 mg daily  Nursing can request a prior authorization or pt can take 1/2 tab of 100 mg along with 25 mg for the dose of 75 mg for 14 days before 100 mg daily

## 2022-11-10 DIAGNOSIS — F41.1 GENERALIZED ANXIETY DISORDER: ICD-10-CM

## 2022-11-10 DIAGNOSIS — F32.A DEPRESSION, UNSPECIFIED DEPRESSION TYPE: Primary | ICD-10-CM

## 2022-11-10 RX ORDER — LAMOTRIGINE 100 MG/1
TABLET ORAL
Qty: 30 TABLET | Refills: 1 | Status: SHIPPED | OUTPATIENT
Start: 2022-11-25

## 2022-11-10 RX ORDER — LAMOTRIGINE 25 MG/1
TABLET ORAL
Qty: 14 TABLET | Refills: 0 | Status: SHIPPED | OUTPATIENT
Start: 2022-11-10

## 2022-11-10 RX ORDER — LAMOTRIGINE 100 MG/1
TABLET ORAL
Qty: 7 TABLET | Refills: 0 | Status: SHIPPED | OUTPATIENT
Start: 2022-11-10

## 2022-11-10 NOTE — TELEPHONE ENCOUNTER
Lamotrigine dosing adjusted to accommodate coverage by insurance  Spoke with the pharmacist at [facility]  They were able to process and are preparing the medication  Spoke with Bryant Auguste and reviewed the above information  Bryant Auguste had enough of a previous prescription for lamotrigine 25mg and has been taking 75 mg  On Monday, 11/14/22, she is due to increase the dose to 100 mg  Can she go to the 100 mg tab instead? Will follow up with Bryant Auguste after review

## 2022-11-11 NOTE — TELEPHONE ENCOUNTER
Received a message from Dr Noah Tenorio:  he spoke with Airam Bragg about the increase in lamotrigine

## 2022-11-15 DIAGNOSIS — F90.2 ADHD (ATTENTION DEFICIT HYPERACTIVITY DISORDER), COMBINED TYPE: ICD-10-CM

## 2022-11-16 RX ORDER — DEXTROAMPHETAMINE SACCHARATE, AMPHETAMINE ASPARTATE MONOHYDRATE, DEXTROAMPHETAMINE SULFATE AND AMPHETAMINE SULFATE 1.25; 1.25; 1.25; 1.25 MG/1; MG/1; MG/1; MG/1
10 CAPSULE, EXTENDED RELEASE ORAL EVERY MORNING
Qty: 60 CAPSULE | Refills: 0 | Status: SHIPPED | OUTPATIENT
Start: 2022-11-16 | End: 2022-12-16

## 2022-11-29 DIAGNOSIS — F32.A DEPRESSION, UNSPECIFIED DEPRESSION TYPE: ICD-10-CM

## 2022-11-29 DIAGNOSIS — F41.1 GENERALIZED ANXIETY DISORDER: ICD-10-CM

## 2022-11-30 RX ORDER — LAMOTRIGINE 100 MG/1
100 TABLET ORAL DAILY
Qty: 30 TABLET | Refills: 2 | Status: SHIPPED | OUTPATIENT
Start: 2022-11-30 | End: 2022-12-07

## 2022-11-30 NOTE — TELEPHONE ENCOUNTER
Patient requested refill for lamotrigine 100mg PO tabs daily    Refill request was approved, 30 day supply, with 2 refills, sent to pharmacy upon patient request      Pau Braswell MD

## 2022-12-07 ENCOUNTER — OFFICE VISIT (OUTPATIENT)
Dept: PSYCHIATRY | Facility: CLINIC | Age: 32
End: 2022-12-07

## 2022-12-07 VITALS — BODY MASS INDEX: 31.95 KG/M2 | WEIGHT: 192 LBS

## 2022-12-07 DIAGNOSIS — F41.1 GENERALIZED ANXIETY DISORDER: ICD-10-CM

## 2022-12-07 DIAGNOSIS — F90.2 ADHD (ATTENTION DEFICIT HYPERACTIVITY DISORDER), COMBINED TYPE: ICD-10-CM

## 2022-12-07 DIAGNOSIS — F32.A DEPRESSION, UNSPECIFIED DEPRESSION TYPE: ICD-10-CM

## 2022-12-07 RX ORDER — LAMOTRIGINE 100 MG/1
100 TABLET ORAL DAILY
Qty: 30 TABLET | Refills: 2 | Status: SHIPPED | OUTPATIENT
Start: 2022-12-07

## 2022-12-07 RX ORDER — DEXTROAMPHETAMINE SACCHARATE, AMPHETAMINE ASPARTATE MONOHYDRATE, DEXTROAMPHETAMINE SULFATE AND AMPHETAMINE SULFATE 1.25; 1.25; 1.25; 1.25 MG/1; MG/1; MG/1; MG/1
10 CAPSULE, EXTENDED RELEASE ORAL EVERY MORNING
Qty: 60 CAPSULE | Refills: 0
Start: 2022-12-13 | End: 2023-01-12
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
## 2022-12-28 ENCOUNTER — TELEPHONE (OUTPATIENT)
Dept: PSYCHIATRY | Facility: CLINIC | Age: 32
End: 2022-12-28

## 2022-12-28 DIAGNOSIS — F41.1 GENERALIZED ANXIETY DISORDER: ICD-10-CM

## 2022-12-28 DIAGNOSIS — F32.A DEPRESSION, UNSPECIFIED DEPRESSION TYPE: ICD-10-CM

## 2022-12-28 DIAGNOSIS — F90.2 ADHD (ATTENTION DEFICIT HYPERACTIVITY DISORDER), COMBINED TYPE: ICD-10-CM

## 2022-12-28 RX ORDER — DEXTROAMPHETAMINE SACCHARATE, AMPHETAMINE ASPARTATE MONOHYDRATE, DEXTROAMPHETAMINE SULFATE AND AMPHETAMINE SULFATE 2.5; 2.5; 2.5; 2.5 MG/1; MG/1; MG/1; MG/1
10 CAPSULE, EXTENDED RELEASE ORAL EVERY MORNING
Qty: 30 CAPSULE | Refills: 0 | Status: SHIPPED | OUTPATIENT
Start: 2022-12-28

## 2022-12-28 RX ORDER — LAMOTRIGINE 100 MG/1
100 TABLET ORAL DAILY
Qty: 30 TABLET | Refills: 2 | Status: SHIPPED | OUTPATIENT
Start: 2022-12-28 | End: 2023-01-05

## 2022-12-28 RX ORDER — DEXTROAMPHETAMINE SACCHARATE, AMPHETAMINE ASPARTATE MONOHYDRATE, DEXTROAMPHETAMINE SULFATE AND AMPHETAMINE SULFATE 1.25; 1.25; 1.25; 1.25 MG/1; MG/1; MG/1; MG/1
10 CAPSULE, EXTENDED RELEASE ORAL EVERY MORNING
Qty: 60 CAPSULE | Refills: 0
Start: 2022-12-28 | End: 2022-12-28 | Stop reason: DRUGHIGH

## 2022-12-28 NOTE — TELEPHONE ENCOUNTER
Before requesting refills on both medications, the patient would like to discuss increasing the dosages for Adderall & Lamictal

## 2022-12-28 NOTE — TELEPHONE ENCOUNTER
Spoke to patient who is having difficulty with mood swings and focus/concentration over the last month after being stable at last appointment on medication regiment  She is requesting adjustments in medication  Interviewer well contacted  to have her appointment moved sooner and medications refilled as patient requested  Patient requested refill for lamictal 100mg tablet  Refill request was approved, 30 day supply, with 1 refills, sent to pharmacy upon patient request     Patient requested refill for adderall 10 mg XR capsule (2 5mg capsules), 30 day supply, with 0 refills  Refill request was sent to Dr Media Osgood, my indirect supervisor, who will review and decide to approve/send to pharmacy        Chinedu Max MD

## 2022-12-29 ENCOUNTER — TELEPHONE (OUTPATIENT)
Dept: GYNECOLOGY | Facility: CLINIC | Age: 32
End: 2022-12-29

## 2022-12-30 ENCOUNTER — ULTRASOUND (OUTPATIENT)
Dept: GYNECOLOGY | Facility: CLINIC | Age: 32
End: 2022-12-30

## 2022-12-30 ENCOUNTER — OFFICE VISIT (OUTPATIENT)
Dept: GYNECOLOGY | Facility: CLINIC | Age: 32
End: 2022-12-30

## 2022-12-30 VITALS
WEIGHT: 183.6 LBS | SYSTOLIC BLOOD PRESSURE: 122 MMHG | BODY MASS INDEX: 30.59 KG/M2 | DIASTOLIC BLOOD PRESSURE: 74 MMHG | HEIGHT: 65 IN

## 2022-12-30 DIAGNOSIS — R10.2 PELVIC PAIN: Primary | ICD-10-CM

## 2022-12-30 DIAGNOSIS — N91.4 SECONDARY OLIGOMENORRHEA: ICD-10-CM

## 2022-12-30 DIAGNOSIS — R10.2 FEMALE PELVIC PAIN: ICD-10-CM

## 2022-12-30 DIAGNOSIS — Z11.3 SCREEN FOR STD (SEXUALLY TRANSMITTED DISEASE): Primary | ICD-10-CM

## 2022-12-30 DIAGNOSIS — N80.9 ENDOMETRIOSIS: ICD-10-CM

## 2022-12-30 DIAGNOSIS — Z97.5 IUD CONTRACEPTION: ICD-10-CM

## 2022-12-30 DIAGNOSIS — B37.31 VAGINAL CANDIDIASIS: ICD-10-CM

## 2022-12-30 LAB
BV WHIFF TEST VAG QL: NEGATIVE
CLUE CELLS SPEC QL WET PREP: NEGATIVE
PH SMN: 4 [PH]
SL AMB POCT WET MOUNT: YES
T VAGINALIS VAG QL WET PREP: NEGATIVE
YEAST VAG QL WET PREP: POSITIVE

## 2022-12-30 RX ORDER — FLUCONAZOLE 150 MG/1
150 TABLET ORAL ONCE
Qty: 2 TABLET | Refills: 0 | Status: SHIPPED | OUTPATIENT
Start: 2022-12-30 | End: 2022-12-30

## 2022-12-30 NOTE — PROGRESS NOTES
AMB US Pelvic Non OB    Date/Time: 12/30/2022 7:56 AM  Performed by: Onesimo Briceño  Authorized by: Lieutenant Berkley MD   Universal Protocol:  Patient identity confirmed: verbally with patient      Procedure details:     Technique:  Transvaginal US, Non-OB    Position: lithotomy exam    Uterine findings:     Length (cm): 7 64    Height (cm):  4 58    Width (cm):  5 14    Endometrial stripe: identified      Endometrium thickness (mm):  7 1  Left ovary findings:     Left ovary:  Visualized    Length (cm): 4 56    Height (cm): 2 95    Width (cm): 2 99  Right ovary findings:     Right ovary:  Visualized    Length (cm): 4 5    Height (cm): 2 57    Width (cm): 2 78  Other findings:     Free pelvic fluid: not identified      Free peritoneal fluid: not identified    Post-Procedure Details:     Impression:  Retroverted uterus demonstrates an IUD in good position within the endometrium  The uterus and bilateral ovaries appear within normal limits  No free fluid  Tolerance: Tolerated well, no immediate complications    Complications: no complications    Additional Procedure Comments:      [employer] F8 E8C-RS transvaginal transducer Serial # [id number] was used to perform the examination today and subsequently followed with high level disinfection utilizing Trophon EPR procedure  Ultrasound performed at:     [id number]  [address] [facility] [address] [facility]  Phone:  [phone]  Fax:  [phone]

## 2022-12-30 NOTE — PROGRESS NOTES
Assessment/Plan   Diagnoses and all orders for this visit:    Female pelvic pain    Endometriosis    IUD contraception    Secondary oligomenorrhea    Vaginal candidiasis  -     POCT wet mount  -     fluconazole (DIFLUCAN) 150 mg tablet; Take 1 tablet (150 mg total) by mouth once for 1 dose Repeat in 1 week    1  pelvic pain- noted what she thought was "gas pains" approximately starting 1 week ago  Has noted some white discharge over the past 2 or 3 days or so  Diagnosed with yeast exam today  We will treat as noted below  Exam was without any focal tenderness or masses appreciated, only yeast found  Should she have persistence of pain, she will call or return for evaluation  Would recommend ibuprofen or naproxen as needed  2  Mirena IUD- in good position on exam and ultrasound today  IUD was placed 10/30/2018  IUD string noted at a length of 1 5 cm  3  yeast infection- again noted on exam and wet prep  Patient does have somewhat frequent yeast infections  Electronic prescription for fluconazole 150 mg p o  x1 with repeat in 1 week time sent to local pharmacy  Could consider long-term treatment with fluconazole weekly for 8 to 12 weeks time, we will consider this going forward  4  STD- new partner for the past 2 to 3 months time  Had GC/chlamydia which was negative from 9/29/2022  She requested repeat testing, GC/chlamydia done today  We will treat accordingly  Laboratory sheet given for HIV, RPR, hepatitis panel  5  status post laparoscopic bilateral salpingectomy  6  History of endometriosis- could be contributing to her pain  She has had good response with Mirena IUD with oligomenorrhea/amenorrhea  She continue with pain despite treatment for yeast, could consider endometriosis treatments  7  previous history right ovarian cyst-none noted on exam today  Follow-up August 2023 for yearly exam or as needed  Subjective   Patient ID: Grace Kulkarni is a 29 y o  female      Vitals: 22 1158   BP: 122/74     She was seen today for ultrasound and follow-up visit with me  Please see assessment plan for details  The following portions of the patient's history were reviewed and updated as appropriate: allergies, current medications, past family history, past medical history, past social history, past surgical history and problem list   Past Medical History:   Diagnosis Date   • Abdominal pain    • Anxiety    • Asthma     as young child   • Bipolar disorder ([facility] [address] )    • Chronic pain disorder     right hip   • Depression    • Diarrhea    • Endometrial disorder 2012   • Endometriosis    • Epigastric pain    • Fatigue    • History of COVID-19 1/3/2022    On 21   • Irregular menses 2014   • Morbid obesity ([facility] [address] )    • Nausea and vomiting    • Yeast infection 2017     Past Surgical History:   Procedure Laterality Date   • DIAGNOSTIC LAPAROSCOPY     • DIAGNOSTIC LAPAROSCOPY     • DILATION AND CURETTAGE OF UTERUS     • MD COLONOSCOPY FLX DX W/COLLJ SPEC WHEN PFRMD N/A 2017    Procedure: EGD with bx AND COLONOSCOPY with bx;  Surgeon: Jane Bloch, MD;  Location: [facility]; Service: Gastroenterology   • MD LAPAROSCOPY W/RMVL ADNEXAL STRUCTURES Bilateral 2021    Procedure: LAP SALPINGECTOMY;  fulgeration of endometriosis; Surgeon: Grzegorz Amezcua MD;  [facility];  Service: Gynecology     OB History    Para Term  AB Living   5 3 3 0 2 3   SAB IAB Ectopic Multiple Live Births   0 2 0 0 3      # Outcome Date GA Lbr Gregor/2nd Weight Sex Delivery Anes PTL Lv   5 IAB 18           4 Term 16 40w2d 08:55 / 00:07 3590 g (7 lb 14 6 oz) F Vag-Spont None  SRINI   3 Term 14    F Vag-Spont None  SRINI   2 Term 01/20/10    M Vag-Spont None  SRINI      Complications:  Other Excessive Bleeding   1 IAB                Current Outpatient Medications:   •  amphetamine-dextroamphetamine (ADDERALL XR, 10MG,) 10 MG 24 hr capsule, Take 1 capsule (10 mg total) by mouth every morning Max Daily Amount: 10 mg, Disp: 30 capsule, Rfl: 0  •  aspirin-acetaminophen-caffeine (EXCEDRIN MIGRAINE) 250-250-65 MG per tablet, Take 1 tablet by mouth every 6 (six) hours as needed for headaches As needed  , Disp: , Rfl:   •  fluconazole (DIFLUCAN) 150 mg tablet, Take 1 tablet (150 mg total) by mouth once for 1 dose Repeat in 1 week, Disp: 2 tablet, Rfl: 0  •  ibuprofen (MOTRIN) 800 mg tablet, TOME REMBERTO TABLETA POR V A ORAL CADA SEIS HORAS CUANDO SEA NECESARIO PAIN, Disp: , Rfl:   •  lamoTRIgine (LaMICtal) 100 mg tablet, Take 1 tablet (100 mg total) by mouth daily, Disp: 30 tablet, Rfl: 2  •  meclizine (ANTIVERT) 25 mg tablet, Take 1 tablet (25 mg total) by mouth every 8 (eight) hours, Disp: 30 tablet, Rfl: 0  Allergies   Allergen Reactions   • Shellfish-Derived Products - Food Allergy Anaphylaxis     Rash, shortness of breath, chest pain    hosp a few times for this   • Adhesive [Medical Tape] Swelling   • Chlorhexidine Itching     burning   • Other Swelling     All fruits     Social History     Socioeconomic History   • Marital status: Single     Spouse name: None   • Number of children: None   • Years of education: None   • Highest education level: None   Occupational History   • None   Tobacco Use   • Smoking status: Every Day     Packs/day: 0 50     Types: Cigarettes   • Smokeless tobacco: Never   • Tobacco comments:     1/2 pack/ week   Vaping Use   • Vaping Use: Never used   Substance and Sexual Activity   • Alcohol use: Yes     Comment: 2-3 glass/day wine or liquor   • Drug use: Yes     Types: Marijuana     Comment: smokes daily   • Sexual activity: Not Currently     Partners: Male     Birth control/protection: I U D     Other Topics Concern   • None   Social History Narrative   • None     Social Determinants of Health     Financial Resource Strain: Not on file   Food Insecurity: Not on file   Transportation Needs: Not on file   Physical Activity: Not on file   Stress: Not on file   Social Connections: Not on file   Intimate Partner Violence: Not on file   Housing Stability: Not on file     Family History   Problem Relation Age of Onset   • No Known Problems Mother    • No Known Problems Father        Review of Systems   Constitutional: Negative for chills, diaphoresis, fatigue and fever  Respiratory: Negative for apnea, cough, chest tightness, shortness of breath and wheezing  Cardiovascular: Negative for chest pain, palpitations and leg swelling  Gastrointestinal: Negative for abdominal distention, abdominal pain, anal bleeding, constipation, diarrhea, nausea, rectal pain and vomiting  Genitourinary: Negative for difficulty urinating, dyspareunia, dysuria, frequency, hematuria, menstrual problem, pelvic pain, urgency, vaginal bleeding, vaginal discharge and vaginal pain  Musculoskeletal: Negative for arthralgias, back pain and myalgias  Skin: Negative for color change and rash  Neurological: Negative for dizziness, syncope, light-headedness, numbness and headaches  Hematological: Negative for adenopathy  Does not bruise/bleed easily  Psychiatric/Behavioral: Negative for dysphoric mood and sleep disturbance  The patient is not nervous/anxious  Objective   Physical Exam  OBGyn Exam     Objective      /74 (BP Location: Right arm, Patient Position: Sitting)   Ht 5' 5" (1 651 m)   Wt 83 3 kg (183 lb 9 6 oz)   BMI 30 55 kg/m²     General:   alert and oriented, in no acute distress   Neck:    Breast:    Heart:    Lungs:    Abdomen: soft, non-tender, without masses or organomegaly   Vulva: normal   Vagina:  Small amount of white discharge, erythema, without lesions appreciated  Slight tenderness to palpation in the vagina  Cervix:  Small amount of white discharge, without lesions or cervicitis  No CMT  IUD string seen at a length of 1 5 cm     Uterus: top normal size, anteverted, non-tender   Adnexa: no mass, fullness, tenderness   Rectum: deferred    Psych:  Normal mood and affect   Skin:  Without obvious lesions   Eyes: symmetric, with normal movements and reactivity   Musculoskeletal:  Normal muscle tone and movements appreciated

## 2022-12-31 LAB
C TRACH DNA SPEC QL NAA+PROBE: NEGATIVE
N GONORRHOEA DNA SPEC QL NAA+PROBE: NEGATIVE

## 2023-01-05 ENCOUNTER — TELEMEDICINE (OUTPATIENT)
Dept: PSYCHIATRY | Facility: CLINIC | Age: 33
End: 2023-01-05

## 2023-01-05 DIAGNOSIS — F90.2 ADHD (ATTENTION DEFICIT HYPERACTIVITY DISORDER), COMBINED TYPE: ICD-10-CM

## 2023-01-05 DIAGNOSIS — F31.81 BIPOLAR 2 DISORDER (HCC): Primary | ICD-10-CM

## 2023-01-05 DIAGNOSIS — F32.A DEPRESSION, UNSPECIFIED DEPRESSION TYPE: ICD-10-CM

## 2023-01-05 DIAGNOSIS — F41.1 GENERALIZED ANXIETY DISORDER: ICD-10-CM

## 2023-01-05 RX ORDER — LAMOTRIGINE 100 MG/1
150 TABLET ORAL DAILY
Qty: 30 TABLET | Refills: 2 | Status: SHIPPED | OUTPATIENT
Start: 2023-01-05

## 2023-01-05 RX ORDER — DEXTROAMPHETAMINE SACCHARATE, AMPHETAMINE ASPARTATE MONOHYDRATE, DEXTROAMPHETAMINE SULFATE AND AMPHETAMINE SULFATE 1.25; 1.25; 1.25; 1.25 MG/1; MG/1; MG/1; MG/1
CAPSULE, EXTENDED RELEASE ORAL
Qty: 30 CAPSULE | Refills: 0
Start: 2023-01-05 | End: 2023-01-06 | Stop reason: SDUPTHER
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
## 2023-01-06 RX ORDER — DEXTROAMPHETAMINE SACCHARATE, AMPHETAMINE ASPARTATE MONOHYDRATE, DEXTROAMPHETAMINE SULFATE AND AMPHETAMINE SULFATE 1.25; 1.25; 1.25; 1.25 MG/1; MG/1; MG/1; MG/1
CAPSULE, EXTENDED RELEASE ORAL
Qty: 30 CAPSULE | Refills: 0 | Status: SHIPPED | OUTPATIENT
Start: 2023-01-06

## 2023-02-06 DIAGNOSIS — F41.1 GENERALIZED ANXIETY DISORDER: ICD-10-CM

## 2023-02-06 DIAGNOSIS — F32.A DEPRESSION, UNSPECIFIED DEPRESSION TYPE: ICD-10-CM

## 2023-02-06 DIAGNOSIS — F90.2 ADHD (ATTENTION DEFICIT HYPERACTIVITY DISORDER), COMBINED TYPE: ICD-10-CM

## 2023-02-06 RX ORDER — DEXTROAMPHETAMINE SACCHARATE, AMPHETAMINE ASPARTATE MONOHYDRATE, DEXTROAMPHETAMINE SULFATE AND AMPHETAMINE SULFATE 1.25; 1.25; 1.25; 1.25 MG/1; MG/1; MG/1; MG/1
CAPSULE, EXTENDED RELEASE ORAL
Qty: 30 CAPSULE | Refills: 0 | Status: SHIPPED | OUTPATIENT
Start: 2023-02-06

## 2023-02-06 RX ORDER — DEXTROAMPHETAMINE SACCHARATE, AMPHETAMINE ASPARTATE MONOHYDRATE, DEXTROAMPHETAMINE SULFATE AND AMPHETAMINE SULFATE 2.5; 2.5; 2.5; 2.5 MG/1; MG/1; MG/1; MG/1
10 CAPSULE, EXTENDED RELEASE ORAL EVERY MORNING
Qty: 30 CAPSULE | Refills: 0 | Status: SHIPPED | OUTPATIENT
Start: 2023-02-06

## 2023-02-06 RX ORDER — LAMOTRIGINE 100 MG/1
150 TABLET ORAL DAILY
Qty: 30 TABLET | Refills: 2 | Status: SHIPPED | OUTPATIENT
Start: 2023-02-06 | End: 2023-02-08

## 2023-02-06 RX ORDER — DEXTROAMPHETAMINE SACCHARATE, AMPHETAMINE ASPARTATE MONOHYDRATE, DEXTROAMPHETAMINE SULFATE AND AMPHETAMINE SULFATE 2.5; 2.5; 2.5; 2.5 MG/1; MG/1; MG/1; MG/1
10 CAPSULE, EXTENDED RELEASE ORAL EVERY MORNING
Qty: 30 CAPSULE | Refills: 0
Start: 2023-02-06 | End: 2023-02-06 | Stop reason: SDUPTHER

## 2023-02-06 RX ORDER — DEXTROAMPHETAMINE SACCHARATE, AMPHETAMINE ASPARTATE MONOHYDRATE, DEXTROAMPHETAMINE SULFATE AND AMPHETAMINE SULFATE 1.25; 1.25; 1.25; 1.25 MG/1; MG/1; MG/1; MG/1
CAPSULE, EXTENDED RELEASE ORAL
Qty: 30 CAPSULE | Refills: 0
Start: 2023-02-06 | End: 2023-02-06 | Stop reason: SDUPTHER

## 2023-02-06 NOTE — TELEPHONE ENCOUNTER
Patient requested refill for adderal XR 15 mg, 30 day supply, with 0 refills  Refill request was sent to Dr Talya Ryder, my indirect supervisor, who will review and decide to approve/send to pharmacy  Also sent Lamictal 50 mg daily refill      Jill Ramírez MD

## 2023-02-06 NOTE — TELEPHONE ENCOUNTER
Brenda Doan called and LM requesting a refill, however, did not specify what medication  Requesting a call back   [phone]

## 2023-02-06 NOTE — TELEPHONE ENCOUNTER
RALEIGH Olivares  Encouraged to call nursing line (provided number) and specify what medication needs refill

## 2023-02-06 NOTE — TELEPHONE ENCOUNTER
Medication Refill Request     Name of Medication Lamictal  Dose/Frequency 150mg once a day   Quantity 30 tablet  Verified pharmacy   [x]  Verified ordering Provider   [x]  Does patient have enough for the next 3 days? Yes [] No [x]  Does patient have a follow-up appointment scheduled? Yes [x] No []   If so when is appointment: 2/8/23    Medication Refill Request     Name of Medication Adderall XR  Dose/Frequency 5mg once a day   Quantity 30 cap  Verified pharmacy   [x]  Verified ordering Provider   []  Does patient have enough for the next 3 days? Yes [] No [x]  Does patient have a follow-up appointment scheduled? Yes [x] No []   If so when is appointment: 2/8/23    Medication Refill Request     Name of Medication Adderall XR  Dose/Frequency 10mg once a day   Quantity 30 capsule  Verified pharmacy   [x]  Verified ordering Provider   [x]  Does patient have enough for the next 3 days? Yes [] No [x]  Does patient have a follow-up appointment scheduled?  Yes [x] No []   If so when is appointment: 2/8/23

## 2023-02-08 ENCOUNTER — TELEMEDICINE (OUTPATIENT)
Dept: PSYCHIATRY | Facility: CLINIC | Age: 33
End: 2023-02-08

## 2023-02-08 ENCOUNTER — TELEPHONE (OUTPATIENT)
Dept: PSYCHIATRY | Facility: CLINIC | Age: 33
End: 2023-02-08

## 2023-02-08 DIAGNOSIS — F32.A DEPRESSION, UNSPECIFIED DEPRESSION TYPE: ICD-10-CM

## 2023-02-08 DIAGNOSIS — F41.1 GENERALIZED ANXIETY DISORDER: ICD-10-CM

## 2023-02-08 RX ORDER — LAMOTRIGINE 150 MG/1
150 TABLET ORAL DAILY
Qty: 30 TABLET | Refills: 3 | Status: SHIPPED | OUTPATIENT
Start: 2023-02-08 | End: 2023-03-10
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
## 2023-02-08 NOTE — TELEPHONE ENCOUNTER
Writer attempted to contact pt in regards to a vm we received in which pt stated that she hasn't received link for vv yet  Lvm to call back in case that she hasn't received yet

## 2023-02-09 ENCOUNTER — TELEPHONE (OUTPATIENT)
Dept: PSYCHIATRY | Facility: CLINIC | Age: 33
End: 2023-02-09

## 2023-03-02 ENCOUNTER — TELEPHONE (OUTPATIENT)
Dept: PSYCHIATRY | Facility: CLINIC | Age: 33
End: 2023-03-02

## 2023-03-02 NOTE — TELEPHONE ENCOUNTER
Contacted patient off of Talk Therapy  to verify needs of services in attempts to offer patient an appointment at [facility]  spoke with patient whom stated they are still interested in services but would need appointment after 5p  Writer notified patient that unfortunately at this time most providers would not fit in schedule   Patient requested to remain on wait list until after speak with psychiatrist

## 2023-03-17 DIAGNOSIS — F32.A DEPRESSION, UNSPECIFIED DEPRESSION TYPE: ICD-10-CM

## 2023-03-17 DIAGNOSIS — F41.1 GENERALIZED ANXIETY DISORDER: ICD-10-CM

## 2023-03-17 DIAGNOSIS — F90.2 ADHD (ATTENTION DEFICIT HYPERACTIVITY DISORDER), COMBINED TYPE: ICD-10-CM

## 2023-03-20 RX ORDER — DEXTROAMPHETAMINE SACCHARATE, AMPHETAMINE ASPARTATE MONOHYDRATE, DEXTROAMPHETAMINE SULFATE AND AMPHETAMINE SULFATE 2.5; 2.5; 2.5; 2.5 MG/1; MG/1; MG/1; MG/1
10 CAPSULE, EXTENDED RELEASE ORAL EVERY MORNING
Qty: 30 CAPSULE | Refills: 0
Start: 2023-03-20 | End: 2023-03-20 | Stop reason: SDUPTHER

## 2023-03-20 RX ORDER — LAMOTRIGINE 150 MG/1
150 TABLET ORAL DAILY
Qty: 30 TABLET | Refills: 3 | Status: SHIPPED | OUTPATIENT
Start: 2023-03-20 | End: 2023-04-19

## 2023-03-20 RX ORDER — DEXTROAMPHETAMINE SACCHARATE, AMPHETAMINE ASPARTATE MONOHYDRATE, DEXTROAMPHETAMINE SULFATE AND AMPHETAMINE SULFATE 1.25; 1.25; 1.25; 1.25 MG/1; MG/1; MG/1; MG/1
CAPSULE, EXTENDED RELEASE ORAL
Qty: 30 CAPSULE | Refills: 0
Start: 2023-03-20 | End: 2023-03-20 | Stop reason: SDUPTHER

## 2023-03-20 RX ORDER — DEXTROAMPHETAMINE SACCHARATE, AMPHETAMINE ASPARTATE MONOHYDRATE, DEXTROAMPHETAMINE SULFATE AND AMPHETAMINE SULFATE 1.25; 1.25; 1.25; 1.25 MG/1; MG/1; MG/1; MG/1
CAPSULE, EXTENDED RELEASE ORAL
Qty: 30 CAPSULE | Refills: 0 | Status: SHIPPED | OUTPATIENT
Start: 2023-03-20

## 2023-03-20 RX ORDER — DEXTROAMPHETAMINE SACCHARATE, AMPHETAMINE ASPARTATE MONOHYDRATE, DEXTROAMPHETAMINE SULFATE AND AMPHETAMINE SULFATE 2.5; 2.5; 2.5; 2.5 MG/1; MG/1; MG/1; MG/1
10 CAPSULE, EXTENDED RELEASE ORAL EVERY MORNING
Qty: 30 CAPSULE | Refills: 0 | Status: SHIPPED | OUTPATIENT
Start: 2023-03-20

## 2023-03-21 NOTE — TELEPHONE ENCOUNTER
Patient requested refill for adderall XR 15 mg (5 mg 24 hr capsule and 10 mg 24 hr capsule), 30 day supply, with 0 refills  Refill request was sent to Dr Gary Cowden, my indirect supervisor, who will review and decide to approve/send to pharmacy      Also refilled lamictal 150mg daily    Simone Kawasaki, MD

## 2023-05-01 ENCOUNTER — TELEMEDICINE (OUTPATIENT)
Dept: PSYCHIATRY | Facility: CLINIC | Age: 33
End: 2023-05-01

## 2023-05-01 DIAGNOSIS — F32.A DEPRESSION, UNSPECIFIED DEPRESSION TYPE: ICD-10-CM

## 2023-05-01 DIAGNOSIS — F90.2 ADHD (ATTENTION DEFICIT HYPERACTIVITY DISORDER), COMBINED TYPE: ICD-10-CM

## 2023-05-01 DIAGNOSIS — F31.81 BIPOLAR 2 DISORDER (HCC): Primary | ICD-10-CM

## 2023-05-01 DIAGNOSIS — F41.1 GENERALIZED ANXIETY DISORDER: ICD-10-CM

## 2023-05-01 RX ORDER — DEXTROAMPHETAMINE SACCHARATE, AMPHETAMINE ASPARTATE MONOHYDRATE, DEXTROAMPHETAMINE SULFATE AND AMPHETAMINE SULFATE 2.5; 2.5; 2.5; 2.5 MG/1; MG/1; MG/1; MG/1
20 CAPSULE, EXTENDED RELEASE ORAL EVERY MORNING
Qty: 30 CAPSULE | Refills: 0
Start: 2023-05-01 | End: 2023-05-01

## 2023-05-01 RX ORDER — DEXTROAMPHETAMINE SACCHARATE, AMPHETAMINE ASPARTATE MONOHYDRATE, DEXTROAMPHETAMINE SULFATE AND AMPHETAMINE SULFATE 2.5; 2.5; 2.5; 2.5 MG/1; MG/1; MG/1; MG/1
20 CAPSULE, EXTENDED RELEASE ORAL EVERY MORNING
Qty: 30 CAPSULE | Refills: 0 | Status: SHIPPED | OUTPATIENT
Start: 2023-05-01

## 2023-05-01 NOTE — BH TREATMENT PLAN
TREATMENT PLAN        [address] [place] [address]    Name and Date of Birth:  Lizeth Frank 29 y o  1990  Date of Treatment Plan: May 1, 2023  Diagnosis/Diagnoses:    1  Bipolar 2 disorder ([address] )    2  ADHD (attention deficit hyperactivity disorder), combined type    3  Depression, unspecified depression type    4  Generalized anxiety disorder        Strengths/Personal Resources for Self-Care: supportive family, supportive friends, taking medications as prescribed, ability to adapt to life changes, ability to communicate needs, ability to communicate well, ability to listen, ability to reason, ability to understand psychiatric illness, general fund of knowledge, good physical health, independence, motivation for treatment, being resoureceful, self-reliance, special hobby/interest, stable employment    Area/Areas of need: anxiety, anxiety symptoms, depression, depressive symptoms, mood instability, attention and concentration problems, ADHD symptoms, completing school work on time, completing tasks at work, financial problems, lack of motivation, poor attention span, poor concentration, time management, unstable mood    Long Term Goal: continue improvement in ADHD symptoms  Target Date: 6 months - November 1, 2023  Person/Persons responsible for completion of goal: Adarsh Danielson and Katty Caceres MD     Short Term Objective (s) - How will we reach this goal?:   1  Take medications as prescribed  2  Attend psychiatry appointments regularly  3  Start psychotherapy  4  Practice coping skills  5  Try sleep hygiene techniques  6  Avoid alcohol   7  Avoid drugs   8  Eat a healthy diet   9  Take walks regularly  10  Try breathing exercises  11   Try relaxation techniques  Target Date: 6 months - November 1, 2023  Person/Persons Responsible for Completion of Goal: Adarsh Danielson     Progress Towards Goals: Continuing treatment    Treatment Modality: medication management every 1-3 months as needed  Review due 180 days from date of this plan: October 28, 2023   Expected length of service: Ongoing treatment    My physician and I have developed this plan together, and I agree to work on the goals and objectives  I understand the treatment goals that were developed for my treatment  The treatment plan was created between Sarah Madsen MD and Aleksandar Jose on 05/01/23 at 2:44 PM but not signed at the time of the visit due to [address] [place] [address]  The plan was reviewed, and verbal consent was given
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
## 2023-05-30 ENCOUNTER — TELEPHONE (OUTPATIENT)
Dept: PSYCHIATRY | Facility: CLINIC | Age: 33
End: 2023-05-30

## 2023-05-30 NOTE — TELEPHONE ENCOUNTER
FMLA paperwork received via fax  Scanned into patient's chart then placed in provider's mail folder  Patient needs this paperwork competed ASAP

## 2023-06-01 ENCOUNTER — TELEPHONE (OUTPATIENT)
Dept: PSYCHIATRY | Facility: CLINIC | Age: 33
End: 2023-06-01

## 2023-06-01 ENCOUNTER — TELEMEDICINE (OUTPATIENT)
Dept: PSYCHIATRY | Facility: CLINIC | Age: 33
End: 2023-06-01

## 2023-06-01 DIAGNOSIS — F90.2 ADHD (ATTENTION DEFICIT HYPERACTIVITY DISORDER), COMBINED TYPE: ICD-10-CM

## 2023-06-01 DIAGNOSIS — F32.A DEPRESSION, UNSPECIFIED DEPRESSION TYPE: ICD-10-CM

## 2023-06-01 DIAGNOSIS — F41.1 GENERALIZED ANXIETY DISORDER: ICD-10-CM

## 2023-06-01 DIAGNOSIS — F31.81 BIPOLAR 2 DISORDER, MAJOR DEPRESSIVE EPISODE (HCC): Primary | ICD-10-CM

## 2023-06-01 RX ORDER — LAMOTRIGINE 200 MG/1
200 TABLET ORAL DAILY
Qty: 30 TABLET | Refills: 2 | Status: SHIPPED | OUTPATIENT
Start: 2023-06-01 | End: 2023-07-01
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
## 2023-06-01 NOTE — PATIENT INSTRUCTIONS
Please present for your previously scheduled appointment approximately 15 minutes prior to appointment time  If you are running late or are unable to attend your appointment, please call our [facility] [place] [place] office at [phone] or our [place] office at [phone] if applicable to notify the office of your absence  If you are in psychological crisis including not limited to suicidal/homicidal thoughts or thoughts of self-injury, do not hesitate to call/contact your [facility] hotline (see below) or attend to the nearest emergency department  [facility] Crisis: [address] Crisis: [phone]  [address] Crisis: [phone]  [facility] Crisis: [address] Crisis: [address] Crisis: [address]  Crisis: [phone]  [place] Suicide Prevention Hotline: 2-254.985.7491    Please call the office nursing staff for medication issues including refills, problems getting medications, bothersome side effects, etc at [phone]  Please return for a follow up appointment as discussed  If you are running late or are unable to attend your appointment, please call our [facility] [place] [place] office at [phone], or if you were seen in the [facility] office, please call [phone]

## 2023-06-05 ENCOUNTER — TELEPHONE (OUTPATIENT)
Dept: PSYCHIATRY | Facility: CLINIC | Age: 33
End: 2023-06-05

## 2023-06-05 NOTE — TELEPHONE ENCOUNTER
Called patient and LVM stating that FLMA paperwork is done and ready for   MAIRA is at the  for patient to sign to release the forms

## 2023-06-06 ENCOUNTER — TELEPHONE (OUTPATIENT)
Dept: PSYCHIATRY | Facility: CLINIC | Age: 33
End: 2023-06-06

## 2023-06-06 NOTE — TELEPHONE ENCOUNTER
Received VM from Elidia Drake  She stated that she found pharmacy that has Adderall 20 mg in stock     Requested to please write prescription for 20 mg once daily instead of 10 mg 2 capsules (20 mg total)    Please send prescription to [facility] [address] [place]  Preferred pharmacy added to profile

## 2023-06-06 NOTE — TELEPHONE ENCOUNTER
Patient came in to sign MAIRA and writer contacted provider to obtain a needed signature  Writer faxed paper to provider  He said he would fax it back to writer   Then writer will forward it to the [facility]

## 2023-06-06 NOTE — TELEPHONE ENCOUNTER
Writer needed to fax papers to Provider who was out fo the office  Provider said to fas it to him and he faxed it back signed  Writer sent the finished fax to [employer]  Loaded paperwork to Media

## 2023-06-07 ENCOUNTER — TELEPHONE (OUTPATIENT)
Dept: PSYCHIATRY | Facility: CLINIC | Age: 33
End: 2023-06-07

## 2023-06-07 DIAGNOSIS — F90.2 ADHD (ATTENTION DEFICIT HYPERACTIVITY DISORDER), COMBINED TYPE: Primary | ICD-10-CM

## 2023-06-07 DIAGNOSIS — F90.2 ADHD (ATTENTION DEFICIT HYPERACTIVITY DISORDER), COMBINED TYPE: ICD-10-CM

## 2023-06-07 RX ORDER — DEXTROAMPHETAMINE SACCHARATE, AMPHETAMINE ASPARTATE MONOHYDRATE, DEXTROAMPHETAMINE SULFATE AND AMPHETAMINE SULFATE 5; 5; 5; 5 MG/1; MG/1; MG/1; MG/1
20 CAPSULE, EXTENDED RELEASE ORAL EVERY MORNING
Qty: 30 CAPSULE | Refills: 0 | Status: SHIPPED | OUTPATIENT
Start: 2023-06-07 | End: 2023-06-08 | Stop reason: SDUPTHER

## 2023-06-07 RX ORDER — DEXTROAMPHETAMINE SACCHARATE, AMPHETAMINE ASPARTATE MONOHYDRATE, DEXTROAMPHETAMINE SULFATE AND AMPHETAMINE SULFATE 5; 5; 5; 5 MG/1; MG/1; MG/1; MG/1
20 CAPSULE, EXTENDED RELEASE ORAL EVERY MORNING
Qty: 30 CAPSULE | Refills: 0
Start: 2023-06-07 | End: 2023-06-07 | Stop reason: SDUPTHER

## 2023-06-07 NOTE — PROGRESS NOTES
"Patient is being prescribed Adderall XR 20 mg every morning  Her pharmacy had run out of the medication and she called various pharmacies to inquire about medication supply  Patient reached out to MA to discuss: \"Requested to please write prescription for 20 mg once daily instead of 10 mg 2 capsules (20 mg total)  Please send prescription to [facility] [address], [place]  Preferred pharmacy added to profile\"    Patient requested Adderall XR 20 mg capsule, 30 day supply, with 0 refills  Refill request was sent to Dr Carlton Jasmine, my indirect supervisor, who will review and decide to approve/send to new pharmacy as stated above  This dose is a 5 mg increase from patient's previous dosage of Adderall 15 mg ER which was being prescribed as a 10 mg capsule and a 5 mg capsule  Patient is in agreement with this change  · PARQ completed for the class of stimulant medications including anxiety/irritability, insomnia, appetite suppression/weight loss, abuse potential, elevated heart rate and blood pressure, seizures, activation/induction of suzy, unmasking of tic's, growth suppression in children, interactions with other medications, and risk of sudden death       Oma Perez MD      "

## 2023-06-07 NOTE — PROGRESS NOTES
Corrected Adderall 20 mg ER capsule prescription to pharmacy to [facility] [address], [place]      Radha Dykes MD  5:06 PM on 6/7/2023

## 2023-06-07 NOTE — TELEPHONE ENCOUNTER
----- Message from Airam Ordaz sent at 6/7/2023  4:32 PM EDT -----  Patient called and asked if you can give her a call about the FLMA paperwork and the medication Adderall  I told patient that the medical records is done in a different office and it takes up to 1 to 2 weeks for them to send it out and she started to cry  She was saying something about they just need the notes from the last visit to be sent over  I am not sure if we can do that have to ask tomorrow  If you need the paperwork I will have it on my desk      Rafiq Connell

## 2023-06-07 NOTE — TELEPHONE ENCOUNTER
Spoke to patient to discuss FMLA paperwork  On page 5 physician address and other details need to be filled out such as name of hospital   She would also like to request time off and starting on 5/22/2023  Spoke to patient and she is agreeable with having her last 2 notes, which identified the change in her mood from feeling stable to below baseline in terms of ADHD and mood/depression  This can be attached to the [facility] paperwork and release of information  She was alert about the refill and Adderall as well moving forward  She understands that we will be at her pharmacy shortly to be picked up  Patient was worried about being evicted and not being able to take care of her finances, but understands that we are working to get her FMLA paperwork and as soon as possible  It will be sent tomorrow morning  Patient understands  Patient is safe at home, is doing well, no desire to hurt self, contracts for safety  Holly Cardenas MD  5:10 PM on 6/7/2023

## 2023-06-07 NOTE — TELEPHONE ENCOUNTER
Refill has been sent to resident's supervisor; waiting on supervisor to confirm refill prior to pharmacy refilling  Should be refilled shortly  See today's order note for more details      Thank you,  Dr Frank Andre

## 2023-06-07 NOTE — TELEPHONE ENCOUNTER
Adan Stinson left a VM asking if the prescription she requested yesterday was sent to the pharmacy on [facility]     Please review as Adderall does not look to be sent to pharmacy

## 2023-06-08 DIAGNOSIS — F90.2 ADHD (ATTENTION DEFICIT HYPERACTIVITY DISORDER), COMBINED TYPE: ICD-10-CM

## 2023-06-08 RX ORDER — DEXTROAMPHETAMINE SACCHARATE, AMPHETAMINE ASPARTATE MONOHYDRATE, DEXTROAMPHETAMINE SULFATE AND AMPHETAMINE SULFATE 5; 5; 5; 5 MG/1; MG/1; MG/1; MG/1
20 CAPSULE, EXTENDED RELEASE ORAL EVERY MORNING
Qty: 30 CAPSULE | Refills: 0 | Status: SHIPPED | OUTPATIENT
Start: 2023-06-08 | End: 2023-07-08

## 2023-06-08 RX ORDER — DEXTROAMPHETAMINE SACCHARATE, AMPHETAMINE ASPARTATE MONOHYDRATE, DEXTROAMPHETAMINE SULFATE AND AMPHETAMINE SULFATE 5; 5; 5; 5 MG/1; MG/1; MG/1; MG/1
20 CAPSULE, EXTENDED RELEASE ORAL EVERY MORNING
Qty: 30 CAPSULE | Refills: 0
Start: 2023-06-08 | End: 2023-06-08 | Stop reason: SDUPTHER

## 2023-06-08 NOTE — PROGRESS NOTES
Corrected Adderall 20 mg ER capsule prescription to pharmacy to [facility] [address], [facility]      Marlon Okeefe MD  6/8/2023 at 8:30 AM

## 2023-06-15 ENCOUNTER — TELEPHONE (OUTPATIENT)
Dept: PSYCHIATRY | Facility: CLINIC | Age: 33
End: 2023-06-15

## 2023-06-15 ENCOUNTER — TELEMEDICINE (OUTPATIENT)
Dept: PSYCHIATRY | Facility: CLINIC | Age: 33
End: 2023-06-15
Payer: COMMERCIAL

## 2023-06-15 DIAGNOSIS — F31.81 BIPOLAR 2 DISORDER, MAJOR DEPRESSIVE EPISODE (HCC): Primary | ICD-10-CM

## 2023-06-15 DIAGNOSIS — F41.1 GENERALIZED ANXIETY DISORDER: ICD-10-CM

## 2023-06-15 DIAGNOSIS — F90.2 ADHD (ATTENTION DEFICIT HYPERACTIVITY DISORDER), COMBINED TYPE: ICD-10-CM

## 2023-06-15 PROCEDURE — 99214 OFFICE O/P EST MOD 30 MIN: CPT | Performed by: PSYCHIATRY & NEUROLOGY

## 2023-06-15 RX ORDER — LAMOTRIGINE 150 MG/1
150 TABLET ORAL DAILY
Qty: 30 TABLET | Refills: 0 | Status: SHIPPED | OUTPATIENT
Start: 2023-06-15 | End: 2023-07-15

## 2023-06-15 NOTE — TELEPHONE ENCOUNTER
Case Management received an asap referral from Dr Nguyễn Francois  Writer completed FMLA paperwork with Dr Nguyễn Francois  Information faxed to [facility]  Psych Notes from 6/1and 6/15 were included

## 2023-06-15 NOTE — PATIENT INSTRUCTIONS
"Please call the office nursing staff for medication issues including refills, problems getting medications, bothersome side effects, etc , at [phone]  Please return for a follow up appointment as discussed and arrive approximately 15 minutes prior to your appointment time  If you are running late or are unable to attend your appointment, please call our [place] office at [phone] (fax: [phone]), or if you were seen in the [place]  office, please call [phone] (fax [phone])  Recommendations regarding insomnia:  Wake-up at the same time every day  Refrain from \"napping\"  Refrain from going to bed unless you're tired  Utilize your bedroom for sleep only  Avoid use of electronics including television and/or cellphone/computers  Refrain from use of electronics including television and/or cellphones/computers prior to bed  Turn your alarm clock away so the light is not visible  Attempt relaxation using various means like reading if you're restless in bed for approximately 15-20 minutes  Participate in regular physical activities like exercise, although avoid approximately 3-4 hours prior to bed  Morning exercise is ideal   Avoid caffeine use prior to bedtime  Consider tapering down excessive use of caffeine  Avoid tobacco use prior to bedtime  Avoid alcohol use prior to bedtime  Consider reading \"No More Sleepless nights\" by Chip Knight, Ph D   Consider use of online resources including:  http://[employer]ph[employer]/cbt-online-insomnia-treatment html  [employer]  com  CBT-I  Lisa on your Smart Phone  Go! To Sleep by the [facility]  Look up \"grounding techniques\" and/or \"anchoring demonstration\" online and try a few to see what may work for you  Practice these skills before you need them, when you are not feeling too anxious or triggered   You can also search for free guided meditation videos online to help improve your head space when you are feeling " "very anxious or triggered  Healthy Diet   The American Heart Association and the [employer] of Cardiology have long recommended a healthy diet for not only patients who are at risk for atherosclerotic cardiovascular disease (ASCVD) but also the general public  In keeping with this evidence-based recommendation, the \"2018 Guideline on the Management of Blood Cholesterol\" stresses that a healthy diet should include adequate intake of these essentials:   Vegetables, fruits, and whole grains   Legumes and nuts   Low-fat dairy products   Low-fat poultry (without the skin)   Fish and seafood   Nontropical vegetable oils     The recent guidelines do provide room for cultural food preferences in a healthy diet, but in general, all patients should limit their intake of saturated and avoid all trans fats, sweets, sugar-sweetened beverages, and red meats  Please maintain adequate hydration of at least 2 Liters of water per day, and improve nutrition by decreasing portion sizes, avoiding fried foods, fast foods, inappropriate snacking and overly processed and packaged items with 'added sugars' (whether in drinks or foods), and observing nutritional facts information on any items that are packaged to reduce intake of saturated fats and AVOID trans fats as mentioned above  Again, consume whole foods such as vegetables, higher lean protein intake, and using healthier lifestyle plans such as the Mediterranean diet with healthier fats such as those from seeds, nuts, and using organic extra virgin olive oil or avocado oil in lieu of processed vegetable oils or margarine  Physical Activity   Recommended DAILY exercise for at least 150 minutes of moderate exercise weekly! In addition to a healthy diet, all patients should include regular physical activity in their weekly routines, at moderate to vigorous intensity   Any activity is better than nothing, so if your patients can't meet the recommendation of vigorous " activity, moderate-intensity activity can still help them reduce their risk of ASCVD  Below are the American Heart Association's recommendations for physical activity per week (preferably spread throughout the week): For Overall Cardiovascular Health and Lowering Cholesterol   At least 150 minutes of moderate-intensity physical activity (for example, 30 minutes, 5 days a week), or   At least 75 minutes of vigorous-intensity physical activity (for example, 25 minutes, 3 days a week); or   A combination of moderate- and vigorous-intensity aerobic activity, and   At least 2 days of moderate- to high-intensity muscle-strengthening activities (such as resistance weight training) for additional health benefits    If you have thoughts of harming yourself or are otherwise in psychological crisis, do not hesitate to contact your [address], or 911 or go to the nearest emergency room  Adult Crisis Numbers  Suicide Prevention Hotline - Dial   [facility]: [phone] or [facility]: BLACK [place] [address]: [address]: [phone]  [facility]: [phone] or [phone]  [facility]: [address] [place] [address] Avenue: [phone] or [facility] Crisis: [address]: [phone] (daytime)  [phone] (after hours, weekends, holidays)     Child/Adolescent Crisis Numbers   [facility]: Luige Vik 10: [address], [place]: [phone]   [facility]: [phone]  Please note: Some [facility] do not have a separate number for Child/Adolescent specific crisis  If your county is not listed under Child/Adolescent, please call the adult number for your county     National Talk to Text Line   All Ages - [address]: [phone] or call 131
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
## 2023-06-15 NOTE — TELEPHONE ENCOUNTER
Case Management received a referral from Dr Alexandru Miller to assist patient in finding resources to help with food, financial aid, and insurance  Writer suggested that Adan Stinson obtain an ICM to help her connect with community resources; patient refused as she does not like meeting people  Writer confirmed that Adan Stinson does have [employer] and that she has a list of [employer] in her area  Writer will send information on how to apply for Medical Assistance for Worker's with Disabilities (MAWD)  Email in chart was confirmed with patient

## 2023-06-16 ENCOUNTER — TELEPHONE (OUTPATIENT)
Dept: PSYCHIATRY | Facility: CLINIC | Age: 33
End: 2023-06-16

## 2023-06-22 ENCOUNTER — TELEPHONE (OUTPATIENT)
Dept: PSYCHIATRY | Facility: CLINIC | Age: 33
End: 2023-06-22

## 2023-06-22 NOTE — TELEPHONE ENCOUNTER
Called pt to offer therapy appt at [facility] office writer called and was hung up on writer tried to call again and lvm to call intake at [phone]

## 2023-06-26 ENCOUNTER — TELEPHONE (OUTPATIENT)
Dept: PSYCHIATRY | Facility: CLINIC | Age: 33
End: 2023-06-26

## 2023-06-26 NOTE — TELEPHONE ENCOUNTER
Writer returned pt call  Writer was able to schedule NP appt with lucio on 7/11 as well as a 2 week follow up   Pt is currently established with Dr Vidal Courts

## 2023-06-26 NOTE — TELEPHONE ENCOUNTER
Writer called back and VM regarding missed call from an appointment with intake   Patient can be reached at [phone]    Thank you

## 2023-06-28 NOTE — TELEPHONE ENCOUNTER
Patient was in the office to  forms completed by provider  She is afraid they will not accept the forms due to the writing being small and may be not legible  Patient's deadline is 1pm same day 6/28  If they do not accept the paperwork she will call the office to inform  Provider will need to print last visit notes including diagnosis to provide for patient if need be  For your review

## 2023-07-03 ENCOUNTER — OFFICE VISIT (OUTPATIENT)
Dept: FAMILY MEDICINE CLINIC | Facility: CLINIC | Age: 33
End: 2023-07-03

## 2023-07-03 VITALS
TEMPERATURE: 97.6 F | HEIGHT: 65 IN | BODY MASS INDEX: 31.16 KG/M2 | RESPIRATION RATE: 16 BRPM | DIASTOLIC BLOOD PRESSURE: 70 MMHG | SYSTOLIC BLOOD PRESSURE: 116 MMHG | HEART RATE: 83 BPM | OXYGEN SATURATION: 99 % | WEIGHT: 187 LBS

## 2023-07-03 DIAGNOSIS — Z78.9 ALLERGY HISTORY UNKNOWN: ICD-10-CM

## 2023-07-03 DIAGNOSIS — Z00.00 ANNUAL PHYSICAL EXAM: Primary | ICD-10-CM

## 2023-07-03 PROCEDURE — 99213 OFFICE O/P EST LOW 20 MIN: CPT

## 2023-07-03 PROCEDURE — 99395 PREV VISIT EST AGE 18-39: CPT

## 2023-07-03 NOTE — PROGRESS NOTES
[address] [facility] [place]    NAME: Liz Clayton  AGE: 29 y.o. SEX: female  : 1990     DATE: 7/3/2023     Assessment and Plan:     Problem List Items Addressed This Visit        Other    Allergy history unknown     - Reviewed limited efficacy of allergy testing. Given significant history & for reassurance sake, food allergy tested ordered. Relevant Orders    Food Allergy Profile    Ambulatory Referral to Allergy   Other Visit Diagnoses     Annual physical exam    -  Primary          Immunizations and preventive care screenings were discussed with patient today. Appropriate education was printed on patient's after visit summary. Counseling:  Alcohol/drug use: discussed moderation in alcohol intake, the recommendations for healthy alcohol use, and avoidance of illicit drug use. Dental Health: discussed importance of regular tooth brushing, flossing, and dental visits. Injury prevention: discussed safety/seat belts, safety helmets, smoke detectors, carbon dioxide detectors, and smoking near bedding or upholstery. Sexual health: discussed sexually transmitted diseases, partner selection, use of condoms, avoidance of unintended pregnancy, and contraceptive alternatives. · Exercise: the importance of regular exercise/physical activity was discussed. Recommend exercise 3-5 times per week for at least 30 minutes. BMI Counseling: Body mass index is 31.12 kg/m². The BMI is above normal. Nutrition recommendations include decreasing portion sizes, encouraging healthy choices of fruits and vegetables, decreasing fast food intake, consuming healthier snacks, limiting drinks that contain sugar, moderation in carbohydrate intake, increasing intake of lean protein, reducing intake of saturated and trans fat and reducing intake of cholesterol. Exercise recommendations include moderate physical activity 150 minutes/week.  No pharmacotherapy was ordered. Rationale for BMI follow-up plan is due to patient being overweight or obese. Return in about 1 year (around 7/3/2024) for Annual physical.     Chief Complaint:     Chief Complaint   Patient presents with   • Allergy Testing      History of Present Illness:     Adult Annual Physical   Patient here for a comprehensive physical exam. The patient reports problems - allergies. 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 She had a reaction a few days ago after she drank something and developed an erythematous pruritic rash on face. She took benadryl and rash subsided. However, she is concerned because she is not sure what was in the drink that caused this reaction. It did not contain shellfish. It is possible it contained pineapple. For reassurance, sake,she would like allergy testing. Diet and Physical Activity  · Diet/Nutrition: well balanced diet. · Exercise: walking. Depression Screening  PHQ-2/9 Depression Screening         General Health  · Sleep: sleeps well and gets 7-8 hours of sleep on average. · Hearing: normal - bilateral.  · Vision: goes for regular eye exams, most recent eye exam <1 year ago and wears glasses. · Dental: regular dental visits, brushes teeth twice daily and flosses teeth occasionally. /GYN Health  · Last menstrual period: unknown  · Contraceptive method: IUD placement. · History of STDs?: no.     Review of Systems:     Review of Systems   Constitutional: Negative for chills and fever. HENT: Negative for ear pain and sore throat. Eyes: Negative for pain and visual disturbance. Respiratory: Negative for cough and shortness of breath. Cardiovascular: Negative for chest pain and palpitations. Gastrointestinal: Negative for abdominal pain and vomiting. Genitourinary: Negative for dysuria and hematuria. Musculoskeletal: Negative for arthralgias and back pain.    Skin: Negative for color change and rash. Neurological: Negative for seizures and syncope. All other systems reviewed and are negative. Past Medical History:     Past Medical History:   Diagnosis Date   • Abdominal pain    • Anxiety    • Asthma     as young child   • Bipolar disorder ([address])    • Chronic pain disorder     right hip   • Depression    • Diarrhea    • Endometrial disorder 12/2012   • Endometriosis    • Epigastric pain    • Fatigue    • History of COVID-19 1/3/2022    On 12/25/21   • Irregular menses 9/11/2014   • Morbid obesity (HCC)    • Nausea and vomiting    • Yeast infection 8/23/2017      Past Surgical History:     Past Surgical History:   Procedure Laterality Date   • DIAGNOSTIC LAPAROSCOPY     • DIAGNOSTIC LAPAROSCOPY     • DILATION AND CURETTAGE OF UTERUS     • CT COLONOSCOPY FLX DX W/COLLJ SPEC WHEN PFRMD N/A 8/4/2017    Procedure: EGD with bx AND COLONOSCOPY with bx;  Surgeon: Annabel Mar MD;  Location: [facility]; Service: Gastroenterology   • CT LAPAROSCOPY W/RMVL ADNEXAL STRUCTURES Bilateral 5/4/2021    Procedure: LAP SALPINGECTOMY;  fulgeration of endometriosis; Surgeon: Clover Lamas MD;  Location: [facility];  Service: Gynecology      Social History:     Social History     Socioeconomic History   • Marital status: Single     Spouse name: None   • Number of children: None   • Years of education: None   • Highest education level: None   Occupational History   • None   Tobacco Use   • Smoking status: Every Day     Packs/day: 0.50     Types: Cigarettes   • Smokeless tobacco: Never   • Tobacco comments:     1/2 pack/ week   Vaping Use   • Vaping Use: Never used   Substance and Sexual Activity   • Alcohol use: Yes     Comment: 2-3 glass/day wine or liquor   • Drug use: Yes     Types: Marijuana     Comment: smokes daily   • Sexual activity: Not Currently     Partners: Male     Birth control/protection: I.U.D.    Other Topics Concern   • None   Social History Narrative   • None     Social Determinants of Health     Financial Resource Strain: Low Risk  (7/3/2023)    Overall Financial Resource Strain (CARDIA)    • Difficulty of Paying Living Expenses: Not hard at all   Food Insecurity: No Food Insecurity (7/3/2023)    Hunger Vital Sign    • Worried About Running Out of Food in the Last Year: Never true    • Ran Out of Food in the Last Year: Never true   Transportation Needs: No Transportation Needs (7/3/2023)    PRAPARE - Transportation    • Lack of Transportation (Medical): No    • Lack of Transportation (Non-Medical): No   Physical Activity: Not on file   Stress: Not on file   Social Connections: Not on file   Intimate Partner Violence: Not on file   Housing Stability: Not on file      Family History:     Family History   Problem Relation Age of Onset   • No Known Problems Mother    • No Known Problems Father       Current Medications:     Current Outpatient Medications   Medication Sig Dispense Refill   • amphetamine-dextroamphetamine (ADDERALL XR, 20MG,) 20 MG 24 hr capsule Take 1 capsule (20 mg total) by mouth every morning Max Daily Amount: 20 mg 30 capsule 0   • aspirin-acetaminophen-caffeine (EXCEDRIN MIGRAINE) 250-250-65 MG per tablet Take 1 tablet by mouth every 6 (six) hours as needed for headaches As needed. • ibuprofen (MOTRIN) 800 mg tablet TOME REMBERTO TABLETA POR V A ORAL CADA SEIS HORAS CUANDO SEA NECESARIO PAIN     • lamoTRIgine (LaMICtal) 150 MG tablet Take 1 tablet (150 mg total) by mouth daily 30 tablet 0   • meclizine (ANTIVERT) 25 mg tablet Take 1 tablet (25 mg total) by mouth every 8 (eight) hours 30 tablet 0     No current facility-administered medications for this visit. Allergies:      Allergies   Allergen Reactions   • Shellfish-Derived Products - Food Allergy Anaphylaxis     Rash, shortness of breath, chest pain    hosp a few times for this   • Adhesive [Medical Tape] Swelling   • Chlorhexidine Itching     burning   • Other Swelling     All fruits      Physical Exam: /70 (BP Location: Right arm, Patient Position: Sitting, Cuff Size: Standard)   Pulse 83   Temp 97.6 °F (36.4 °C) (Temporal)   Resp 16   Ht 5' 5" (1.651 m)   Wt 84.8 kg (187 lb)   SpO2 99%   BMI 31.12 kg/m²     Physical Exam  Vitals and nursing note reviewed. Constitutional:       General: She is not in acute distress. Appearance: Normal appearance. She is well-developed. HENT:      Head: Normocephalic and atraumatic. Right Ear: External ear normal.      Left Ear: External ear normal.      Nose: Nose normal.   Eyes:      Conjunctiva/sclera: Conjunctivae normal.   Cardiovascular:      Rate and Rhythm: Normal rate and regular rhythm. Pulses: Normal pulses. Heart sounds: Normal heart sounds. No murmur heard. Pulmonary:      Effort: Pulmonary effort is normal. No respiratory distress. Breath sounds: Normal breath sounds. Abdominal:      General: Bowel sounds are normal.      Palpations: Abdomen is soft. Tenderness: There is no abdominal tenderness. Musculoskeletal:         General: Normal range of motion. Cervical back: Normal range of motion and neck supple. Skin:     General: Skin is warm and dry. Neurological:      Mental Status: She is alert and oriented to person, place, and time. Mental status is at baseline. Psychiatric:         Mood and Affect: Mood normal.         Behavior: Behavior normal.         Thought Content:  Thought content normal.         Judgment: Judgment normal.          Bonham Perks, [address]

## 2023-07-03 NOTE — ASSESSMENT & PLAN NOTE
- Reviewed limited efficacy of allergy testing. Given significant history & for reassurance sake, food allergy tested ordered.

## 2023-07-11 ENCOUNTER — SOCIAL WORK (OUTPATIENT)
Dept: BEHAVIORAL/MENTAL HEALTH CLINIC | Facility: CLINIC | Age: 33
End: 2023-07-11

## 2023-07-11 DIAGNOSIS — F32.A DEPRESSION, UNSPECIFIED DEPRESSION TYPE: Primary | ICD-10-CM

## 2023-07-11 PROCEDURE — NOSHOW: Performed by: COUNSELOR

## 2023-07-11 NOTE — PSYCH
No Call. No Show. [facility] no showed 07/11/23 appointment. Clinician made numerous attempt to contact the patient. Clinician left a message. Patient will be placed on wait list.    Treatment Plan not due at this session.

## 2023-07-14 ENCOUNTER — TELEPHONE (OUTPATIENT)
Dept: PSYCHIATRY | Facility: CLINIC | Age: 33
End: 2023-07-14

## 2023-07-14 NOTE — TELEPHONE ENCOUNTER
Pt is requesting a letter stating that she is your patient with her diagnosis.     Letter to be faxed to:    Doug Campos, [phone]

## 2023-07-14 NOTE — TELEPHONE ENCOUNTER
Pt also requested that the 'signature date' on the [place] paperwork be updated to 6/30/23 and refaxed. Completed and scanned into chart.

## 2023-07-19 ENCOUNTER — TELEMEDICINE (OUTPATIENT)
Dept: PSYCHIATRY | Facility: CLINIC | Age: 33
End: 2023-07-19

## 2023-07-19 DIAGNOSIS — Z13.0 SCREENING FOR ENDOCRINE, NUTRITIONAL, METABOLIC AND IMMUNITY DISORDER: Primary | ICD-10-CM

## 2023-07-19 DIAGNOSIS — Z13.29 SCREENING FOR ENDOCRINE, NUTRITIONAL, METABOLIC AND IMMUNITY DISORDER: Primary | ICD-10-CM

## 2023-07-19 DIAGNOSIS — F31.81 BIPOLAR 2 DISORDER, MAJOR DEPRESSIVE EPISODE (HCC): ICD-10-CM

## 2023-07-19 DIAGNOSIS — F90.2 ADHD (ATTENTION DEFICIT HYPERACTIVITY DISORDER), COMBINED TYPE: ICD-10-CM

## 2023-07-19 DIAGNOSIS — F41.1 GENERALIZED ANXIETY DISORDER: ICD-10-CM

## 2023-07-19 DIAGNOSIS — Z13.21 SCREENING FOR ENDOCRINE, NUTRITIONAL, METABOLIC AND IMMUNITY DISORDER: Primary | ICD-10-CM

## 2023-07-19 DIAGNOSIS — Z13.228 SCREENING FOR ENDOCRINE, NUTRITIONAL, METABOLIC AND IMMUNITY DISORDER: Primary | ICD-10-CM

## 2023-07-19 RX ORDER — LAMOTRIGINE 200 MG/1
200 TABLET ORAL DAILY
Qty: 30 TABLET | Refills: 1 | Status: SHIPPED | OUTPATIENT
Start: 2023-07-19 | End: 2023-08-18

## 2023-07-19 RX ORDER — HYDROXYZINE HYDROCHLORIDE 25 MG/1
25 TABLET, FILM COATED ORAL EVERY 6 HOURS PRN
Qty: 120 TABLET | Refills: 0 | Status: SHIPPED | OUTPATIENT
Start: 2023-07-19 | End: 2023-08-18
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
## 2023-07-19 NOTE — LETTER
July 19, 2023     To Whom It May Concern:    Patient: Brady Snider   YOB: 1990   Date of Visit: 7/19/2023       Adrian Benton is currently under care at our clinic. She has a diagnosis of bipolar 2 depressed, generalized anxiety disorder, and attention deficit hyperactivity disorder and is being managed with medication management, psychotherapy, and working closely with case management. If you have questions, please do not hesitate to call me.           Sincerely,        Curt Reinoso MD

## 2023-07-19 NOTE — PATIENT INSTRUCTIONS
Please call the office nursing staff for medication issues including refills, problems getting medications, bothersome side effects, etc., at [phone]. Please return for a follow up appointment as discussed and arrive approximately 15 minutes prior to your appointment time. If you are running late or are unable to attend your appointment, please call our [facility] office at [phone] (fax: [phone]), or if you were seen in the [address] [place] [address] office, please call [phone] (fax [phone]). Recommendations regarding insomnia:  Wake-up at the same time every day  Refrain from "napping". Refrain from going to bed unless you're tired  Utilize your bedroom for sleep only. Avoid use of electronics including television and/or cellphone/computers. Refrain from use of electronics including television and/or cellphones/computers prior to bed  Turn your alarm clock away so the light is not visible. Attempt relaxation using various means like reading if you're restless in bed for approximately 15-20 minutes. Participate in regular physical activities like exercise, although avoid approximately 3-4 hours prior to bed. Morning exercise is ideal.  Avoid caffeine use prior to bedtime. Consider tapering down excessive use of caffeine. Avoid tobacco use prior to bedtime. Avoid alcohol use prior to bedtime. Consider reading "No More Sleepless nights" by Jass Barrett, Ph.D.  Consider use of online resources including:  http://[employer]/cbt-online-insomnia-treatment.html  [employer]. com  CBT-I  Lisa on your Smart Phone. Go! To Sleep by the [facility]. Look up "grounding techniques" and/or "anchoring demonstration" online and try a few to see what may work for you. Practice these skills before you need them, when you are not feeling too anxious or triggered.  You can also search for free guided meditation videos online to help improve your head space when you are feeling very anxious or triggered. Healthy Diet   The American Heart Association and the [employer] of Cardiology have long recommended a healthy diet for not only patients who are at risk for atherosclerotic cardiovascular disease (ASCVD) but also the general public. In keeping with this evidence-based recommendation, the "2018 Guideline on the Management of Blood Cholesterol" stresses that a healthy diet should include adequate intake of these essentials:   Vegetables, fruits, and whole grains   Legumes and nuts   Low-fat dairy products   Low-fat poultry (without the skin)   Fish and seafood   Nontropical vegetable oils     The recent guidelines do provide room for cultural food preferences in a healthy diet, but in general, all patients should limit their intake of saturated and avoid all trans fats, sweets, sugar-sweetened beverages, and red meats. Please maintain adequate hydration of at least 2 Liters of water per day, and improve nutrition by decreasing portion sizes, avoiding fried foods, fast foods, inappropriate snacking and overly processed and packaged items with 'added sugars' (whether in drinks or foods), and observing nutritional facts information on any items that are packaged to reduce intake of saturated fats and AVOID trans fats as mentioned above. Again, consume whole foods such as vegetables, higher lean protein intake, and using healthier lifestyle plans such as the Mediterranean diet with healthier fats such as those from seeds, nuts, and using organic extra virgin olive oil or avocado oil in lieu of processed vegetable oils or margarine. Physical Activity   Recommended DAILY exercise for at least 150 minutes of moderate exercise weekly! In addition to a healthy diet, all patients should include regular physical activity in their weekly routines, at moderate to vigorous intensity.  Any activity is better than nothing, so if your patients can't meet the recommendation of vigorous activity, moderate-intensity activity can still help them reduce their risk of ASCVD. Below are the American Heart Association's recommendations for physical activity per week (preferably spread throughout the week): For Overall Cardiovascular Health and Lowering Cholesterol   At least 150 minutes of moderate-intensity physical activity (for example, 30 minutes, 5 days a week), or   At least 75 minutes of vigorous-intensity physical activity (for example, 25 minutes, 3 days a week); or   A combination of moderate- and vigorous-intensity aerobic activity, and   At least 2 days of moderate- to high-intensity muscle-strengthening activities (such as resistance weight training) for additional health benefits    If you have thoughts of harming yourself or are otherwise in psychological crisis, do not hesitate to contact your [address] [facility], or 911 or go to the nearest emergency room. Adult Crisis Numbers  Suicide Prevention Hotline - Dial 9-8-8  [facility]: [phone] or [address] [place] [address] Avenue: [address] [place] [address] 98: [address]: [phone]  [address] [place] [address] Street: [phone] or [phone]  [facility]: [address] [facility] [address]: [phone] or [facility] Crisis: [address] [place] [address] Ne: [phone] (daytime). [phone] (after hours, weekends, holidays)     Child/Adolescent Crisis Numbers   [facility]: [address]: [address], [place]: [phone]   [address] [place] [address]: [phone]  Please note: Some [facility] do not have a separate number for Child/Adolescent specific crisis. If your county is not listed under Child/Adolescent, please call the adult number for your county     National Talk to Text Line   All Ages - [facility] [address]: [phone] or call 8.

## 2023-08-11 ENCOUNTER — TELEPHONE (OUTPATIENT)
Dept: PSYCHIATRY | Facility: CLINIC | Age: 33
End: 2023-08-11

## 2023-08-11 DIAGNOSIS — F90.2 ADHD (ATTENTION DEFICIT HYPERACTIVITY DISORDER), COMBINED TYPE: ICD-10-CM

## 2023-08-11 RX ORDER — DEXTROAMPHETAMINE SACCHARATE, AMPHETAMINE ASPARTATE MONOHYDRATE, DEXTROAMPHETAMINE SULFATE AND AMPHETAMINE SULFATE 5; 5; 5; 5 MG/1; MG/1; MG/1; MG/1
20 CAPSULE, EXTENDED RELEASE ORAL EVERY MORNING
Qty: 30 CAPSULE | Refills: 0
Start: 2023-08-11 | End: 2023-08-11 | Stop reason: SDUPTHER

## 2023-08-11 RX ORDER — DEXTROAMPHETAMINE SACCHARATE, AMPHETAMINE ASPARTATE MONOHYDRATE, DEXTROAMPHETAMINE SULFATE AND AMPHETAMINE SULFATE 5; 5; 5; 5 MG/1; MG/1; MG/1; MG/1
20 CAPSULE, EXTENDED RELEASE ORAL EVERY MORNING
Qty: 30 CAPSULE | Refills: 0 | Status: SHIPPED | OUTPATIENT
Start: 2023-08-11 | End: 2023-09-10

## 2023-08-11 NOTE — TELEPHONE ENCOUNTER
Liseth Mueller left a message requesting a refill of medication (did not leave a specific medication, but Adderall would be due for refill). She also asked about paperwork to be completed for work.

## 2023-08-11 NOTE — PROGRESS NOTES
Winsome Palma called the clinic today and appropriately requested refill of controlled psychotropic medications (Adderall). Review of PDMP website reveals no concerns for abuse or misuse. As such, will refill script today for 30-days as I am covering for patient's primary provider. BLE AROM WFL

## 2023-08-14 NOTE — TELEPHONE ENCOUNTER
Sugey Castanon left , asked about paperwork to be completed for work. Papers need to be turned into work today, 8/14. Called and spoke to Sugey Castanon, she stated she spoke to Dr. Luz Maria Cole early this morning regarding the above. He has taken care of it for her. No further action required.

## 2023-09-03 DIAGNOSIS — F41.1 GENERALIZED ANXIETY DISORDER: ICD-10-CM

## 2023-09-05 ENCOUNTER — TELEPHONE (OUTPATIENT)
Dept: PSYCHIATRY | Facility: CLINIC | Age: 33
End: 2023-09-05

## 2023-09-05 NOTE — TELEPHONE ENCOUNTER
Recieved paperwork from provider from [employer]. Faxed paperwork back to [phone] and emailed the over also to [email or web address].

## 2023-09-06 RX ORDER — HYDROXYZINE HYDROCHLORIDE 25 MG/1
25 TABLET, FILM COATED ORAL EVERY 6 HOURS PRN
Qty: 120 TABLET | Refills: 0 | Status: SHIPPED | OUTPATIENT
Start: 2023-09-06

## 2023-09-15 ENCOUNTER — TELEMEDICINE (OUTPATIENT)
Dept: PSYCHIATRY | Facility: CLINIC | Age: 33
End: 2023-09-15

## 2023-09-15 DIAGNOSIS — F41.1 GENERALIZED ANXIETY DISORDER: ICD-10-CM

## 2023-09-15 DIAGNOSIS — F90.2 ADHD (ATTENTION DEFICIT HYPERACTIVITY DISORDER), COMBINED TYPE: ICD-10-CM

## 2023-09-15 DIAGNOSIS — F31.81 BIPOLAR 2 DISORDER, MAJOR DEPRESSIVE EPISODE (HCC): Primary | ICD-10-CM

## 2023-09-15 RX ORDER — LAMOTRIGINE 200 MG/1
TABLET ORAL
Qty: 30 TABLET | Refills: 1 | Status: SHIPPED | OUTPATIENT
Start: 2023-09-15

## 2023-09-15 RX ORDER — LAMOTRIGINE 25 MG/1
TABLET ORAL
Qty: 120 TABLET | Refills: 0 | Status: SHIPPED | OUTPATIENT
Start: 2023-09-15
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
## 2023-09-18 NOTE — PATIENT INSTRUCTIONS
Psychotherapy is recommended: go to PsychologyToday. com to find a therapist who takes your insurance. Please call the office nursing staff for medication issues including refills, problems getting medications, bothersome side effects, etc., at [phone]. Please return for a follow up appointment as discussed and arrive approximately 15 minutes prior to your appointment time. If you are running late or are unable to attend your appointment, please call our [address] [facility] office at [phone] (fax: [phone]), or if you were seen in the [address] [place] [address] office, please call [phone] (fax [phone]). Recommendations regarding insomnia:  Wake-up at the same time every day  Refrain from "napping". Refrain from going to bed unless you're tired  Utilize your bedroom for sleep only. Avoid use of electronics including television and/or cellphone/computers. Refrain from use of electronics including television and/or cellphones/computers prior to bed  Turn your alarm clock away so the light is not visible. Attempt relaxation using various means like reading if you're restless in bed for approximately 15-20 minutes. Participate in regular physical activities like exercise, although avoid approximately 3-4 hours prior to bed. Morning exercise is ideal.  Avoid caffeine use prior to bedtime. Consider tapering down excessive use of caffeine. Avoid tobacco use prior to bedtime. Avoid alcohol use prior to bedtime. Consider reading "No More Sleepless nights" by Katharina Padgett, Ph.D.  Consider use of online resources including:  http://[employer]/cbt-online-insomnia-treatment.html  [facility]. com  CBT-I  Lisa on your Smart Phone. Go! To Sleep by the [facility]. Look up "grounding techniques" and/or "anchoring demonstration" online and try a few to see what may work for you. Practice these skills before you need them, when you are not feeling too anxious or triggered.  You can also search for free guided meditation videos online to help improve your head space when you are feeling very anxious or triggered. Healthy Diet   The American Heart Association and the [employer] of Cardiology have long recommended a healthy diet for not only patients who are at risk for atherosclerotic cardiovascular disease (ASCVD) but also the general public. In keeping with this evidence-based recommendation, the "2018 Guideline on the Management of Blood Cholesterol" stresses that a healthy diet should include adequate intake of these essentials:   Vegetables, fruits, and whole grains   Legumes and nuts   Low-fat dairy products   Low-fat poultry (without the skin)   Fish and seafood   Nontropical vegetable oils     The recent guidelines do provide room for cultural food preferences in a healthy diet, but in general, all patients should limit their intake of saturated and avoid all trans fats, sweets, sugar-sweetened beverages, and red meats. Please maintain adequate hydration of at least 2 Liters of water per day, and improve nutrition by decreasing portion sizes, avoiding fried foods, fast foods, inappropriate snacking and overly processed and packaged items with 'added sugars' (whether in drinks or foods), and observing nutritional facts information on any items that are packaged to reduce intake of saturated fats and AVOID trans fats as mentioned above. Again, consume whole foods such as vegetables, higher lean protein intake, and using healthier lifestyle plans such as the Mediterranean diet with healthier fats such as those from seeds, nuts, and using organic extra virgin olive oil or avocado oil in lieu of processed vegetable oils or margarine. Physical Activity   Recommended DAILY exercise for at least 150 minutes of moderate exercise weekly!   In addition to a healthy diet, all patients should include regular physical activity in their weekly routines, at moderate to vigorous intensity. Any activity is better than nothing, so if your patients can't meet the recommendation of vigorous activity, moderate-intensity activity can still help them reduce their risk of ASCVD. Below are the American Heart Association's recommendations for physical activity per week (preferably spread throughout the week): For Overall Cardiovascular Health and Lowering Cholesterol   At least 150 minutes of moderate-intensity physical activity (for example, 30 minutes, 5 days a week), or   At least 75 minutes of vigorous-intensity physical activity (for example, 25 minutes, 3 days a week); or   A combination of moderate- and vigorous-intensity aerobic activity, and   At least 2 days of moderate- to high-intensity muscle-strengthening activities (such as resistance weight training) for additional health benefits    If you have thoughts of harming yourself or are otherwise in psychological crisis, do not hesitate to contact your [address] [facility], or 911 or go to the nearest emergency room. Adult Crisis Numbers  Suicide Prevention Hotline - Dial   [facility]: [phone] or [address] [place] [address] Avenue: [address] [place] [address] 98: [address]: [phone]  [facility]: [phone] or [phone]  [facility]: [address] [facility] [address] [facility] [address]: [phone] or [facility] Crisis: [address] [facility] [facility] [address]: [phone] (daytime). [phone] (after hours, weekends, holidays)     Child/Adolescent Crisis Numbers   [facility]: [address] [facility] [facility] [address]: [address] [place], [place]: [phone]   [facility]: [phone]  Please note: Some [facility] do not have a separate number for Child/Adolescent specific crisis.  If your county is not listed under Child/Adolescent, please call the adult number for your county     National Talk to [employer]   All Ages - 104-388    National Suicide Prevention Hotline: 2-386.189.3055 or call 427.

## 2023-09-19 ENCOUNTER — APPOINTMENT (EMERGENCY)
Dept: RADIOLOGY | Facility: HOSPITAL | Age: 33
End: 2023-09-19
Payer: MEDICARE

## 2023-09-19 ENCOUNTER — HOSPITAL ENCOUNTER (EMERGENCY)
Facility: HOSPITAL | Age: 33
Discharge: HOME | End: 2023-09-19
Attending: EMERGENCY MEDICINE
Payer: MEDICARE

## 2023-09-19 ENCOUNTER — TELEPHONE (OUTPATIENT)
Dept: CARDIOLOGY CLINIC | Facility: CLINIC | Age: 33
End: 2023-09-19

## 2023-09-19 VITALS
RESPIRATION RATE: 16 BRPM | OXYGEN SATURATION: 100 % | TEMPERATURE: 98.2 F | SYSTOLIC BLOOD PRESSURE: 110 MMHG | HEART RATE: 69 BPM | DIASTOLIC BLOOD PRESSURE: 68 MMHG

## 2023-09-19 DIAGNOSIS — R07.9 CHEST PAIN: ICD-10-CM

## 2023-09-19 DIAGNOSIS — F90.2 ADHD (ATTENTION DEFICIT HYPERACTIVITY DISORDER), COMBINED TYPE: ICD-10-CM

## 2023-09-19 DIAGNOSIS — R00.2 PALPITATIONS: Primary | ICD-10-CM

## 2023-09-19 LAB
ANION GAP SERPL CALCULATED.3IONS-SCNC: 2 MMOL/L
BASOPHILS # BLD AUTO: 0.04 THOUSANDS/ÂΜL (ref 0–0.1)
BASOPHILS NFR BLD AUTO: 1 % (ref 0–1)
BUN SERPL-MCNC: 12 MG/DL (ref 5–25)
CALCIUM SERPL-MCNC: 9.2 MG/DL (ref 8.4–10.2)
CARDIAC TROPONIN I PNL SERPL HS: <2 NG/L
CHLORIDE SERPL-SCNC: 104 MMOL/L (ref 96–108)
CO2 SERPL-SCNC: 30 MMOL/L (ref 21–32)
CREAT SERPL-MCNC: 0.75 MG/DL (ref 0.6–1.3)
EOSINOPHIL # BLD AUTO: 0.22 THOUSAND/ÂΜL (ref 0–0.61)
EOSINOPHIL NFR BLD AUTO: 3 % (ref 0–6)
ERYTHROCYTE [DISTWIDTH] IN BLOOD BY AUTOMATED COUNT: 12.7 % (ref 11.6–15.1)
GFR SERPL CREATININE-BSD FRML MDRD: 106 ML/MIN/1.73SQ M
GLUCOSE SERPL-MCNC: 95 MG/DL (ref 65–140)
HCT VFR BLD AUTO: 38.7 % (ref 34.8–46.1)
HGB BLD-MCNC: 12.5 G/DL (ref 11.5–15.4)
IMM GRANULOCYTES # BLD AUTO: 0.03 THOUSAND/UL (ref 0–0.2)
IMM GRANULOCYTES NFR BLD AUTO: 0 % (ref 0–2)
LYMPHOCYTES # BLD AUTO: 2.13 THOUSANDS/ÂΜL (ref 0.6–4.47)
LYMPHOCYTES NFR BLD AUTO: 27 % (ref 14–44)
MAGNESIUM SERPL-MCNC: 2 MG/DL (ref 1.9–2.7)
MCH RBC QN AUTO: 30.6 PG (ref 26.8–34.3)
MCHC RBC AUTO-ENTMCNC: 32.3 G/DL (ref 31.4–37.4)
MCV RBC AUTO: 95 FL (ref 82–98)
MONOCYTES # BLD AUTO: 0.71 THOUSAND/ÂΜL (ref 0.17–1.22)
MONOCYTES NFR BLD AUTO: 9 % (ref 4–12)
NEUTROPHILS # BLD AUTO: 4.84 THOUSANDS/ÂΜL (ref 1.85–7.62)
NEUTS SEG NFR BLD AUTO: 60 % (ref 43–75)
NRBC BLD AUTO-RTO: 0 /100 WBCS
PLATELET # BLD AUTO: 222 THOUSANDS/UL (ref 149–390)
PMV BLD AUTO: 9.7 FL (ref 8.9–12.7)
POTASSIUM SERPL-SCNC: 3.9 MMOL/L (ref 3.5–5.3)
RBC # BLD AUTO: 4.08 MILLION/UL (ref 3.81–5.12)
SODIUM SERPL-SCNC: 136 MMOL/L (ref 135–147)
TSH SERPL DL<=0.05 MIU/L-ACNC: 0.97 UIU/ML (ref 0.45–4.5)
WBC # BLD AUTO: 7.97 THOUSAND/UL (ref 4.31–10.16)

## 2023-09-19 PROCEDURE — 99285 EMERGENCY DEPT VISIT HI MDM: CPT

## 2023-09-19 PROCEDURE — 71046 X-RAY EXAM CHEST 2 VIEWS: CPT

## 2023-09-19 PROCEDURE — 36415 COLL VENOUS BLD VENIPUNCTURE: CPT | Performed by: EMERGENCY MEDICINE

## 2023-09-19 PROCEDURE — 84443 ASSAY THYROID STIM HORMONE: CPT | Performed by: EMERGENCY MEDICINE

## 2023-09-19 PROCEDURE — 80048 BASIC METABOLIC PNL TOTAL CA: CPT | Performed by: EMERGENCY MEDICINE

## 2023-09-19 PROCEDURE — 93005 ELECTROCARDIOGRAM TRACING: CPT

## 2023-09-19 PROCEDURE — 85025 COMPLETE CBC W/AUTO DIFF WBC: CPT | Performed by: EMERGENCY MEDICINE

## 2023-09-19 PROCEDURE — 96374 THER/PROPH/DIAG INJ IV PUSH: CPT

## 2023-09-19 PROCEDURE — 99285 EMERGENCY DEPT VISIT HI MDM: CPT | Performed by: EMERGENCY MEDICINE

## 2023-09-19 PROCEDURE — 83735 ASSAY OF MAGNESIUM: CPT | Performed by: EMERGENCY MEDICINE

## 2023-09-19 PROCEDURE — 84484 ASSAY OF TROPONIN QUANT: CPT | Performed by: EMERGENCY MEDICINE

## 2023-09-19 RX ORDER — KETOROLAC TROMETHAMINE 30 MG/ML
15 INJECTION, SOLUTION INTRAMUSCULAR; INTRAVENOUS ONCE
Status: COMPLETED | OUTPATIENT
Start: 2023-09-19 | End: 2023-09-19

## 2023-09-19 RX ORDER — DEXTROAMPHETAMINE SACCHARATE, AMPHETAMINE ASPARTATE MONOHYDRATE, DEXTROAMPHETAMINE SULFATE AND AMPHETAMINE SULFATE 5; 5; 5; 5 MG/1; MG/1; MG/1; MG/1
20 CAPSULE, EXTENDED RELEASE ORAL EVERY MORNING
Qty: 30 CAPSULE | Refills: 0 | Status: SHIPPED | OUTPATIENT
Start: 2023-09-19 | End: 2023-10-19

## 2023-09-19 RX ORDER — DEXTROAMPHETAMINE SACCHARATE, AMPHETAMINE ASPARTATE MONOHYDRATE, DEXTROAMPHETAMINE SULFATE AND AMPHETAMINE SULFATE 5; 5; 5; 5 MG/1; MG/1; MG/1; MG/1
20 CAPSULE, EXTENDED RELEASE ORAL EVERY MORNING
Qty: 30 CAPSULE | Refills: 0
Start: 2023-09-19 | End: 2023-09-19 | Stop reason: SDUPTHER

## 2023-09-19 RX ADMIN — KETOROLAC TROMETHAMINE 15 MG: 30 INJECTION, SOLUTION INTRAMUSCULAR; INTRAVENOUS at 19:07

## 2023-09-19 NOTE — ED PROVIDER NOTES
History  Chief Complaint   Patient presents with   • Palpitations     Pt reports palpitations for the last 40 hours and now has CP. Has hx of syncope and has been lethargic.      34 y.o. F p/w palpitations x yesterday. Woke up yesterday with palpitations, like her heart was skipping every 4 beats. Constant all day yesterday and today until about noon. Still having palpitations, but not as frequent. Also having central chest discomfort that began around noon today. Chest feels sore. Had an episode yesterday where she felt sweaty and lightheaded that lasted about 5 min. Pt reports she does drink caffeine, but no more than usual.  Also a smoker but cutting down. Denies energy drinks. Pt has had palpitations in the past and has been evaluated by cardiology with Holter monitor but no dx. History provided by:  Patient   used: No    Palpitations  Chronicity:  New  Associated symptoms: chest pain    Associated symptoms: no cough, no nausea, no shortness of breath and no vomiting    Risk factors: no diabetes mellitus, no hx of thyroid disease and no hyperthyroidism        Prior to Admission Medications   Prescriptions Last Dose Informant Patient Reported? Taking? amphetamine-dextroamphetamine (ADDERALL XR, 20MG,) 20 MG 24 hr capsule   No No   Sig: Take 1 capsule (20 mg total) by mouth every morning Max Daily Amount: 20 mg   aspirin-acetaminophen-caffeine (EXCEDRIN MIGRAINE) 250-250-65 MG per tablet  Self Yes No   Sig: Take 1 tablet by mouth every 6 (six) hours as needed for headaches As needed. hydrOXYzine HCL (ATARAX) 25 mg tablet   No No   Sig: TAKE 1 TABLET BY MOUTH EVERY 6 HOURS AS NEEDED FOR ANXIETY.    ibuprofen (MOTRIN) 800 mg tablet   Yes No   Sig: TOME REMBERTO TABLETA POR V A ORAL CADA SEIS HORAS CUANDO SEA NECESARIO PAIN   lamoTRIgine (LaMICtal) 200 MG tablet   No No   Sig: Take lamotrigine 200 mg (1 tablet) and a two 25 mg tablets (2 tablets) for a total of 250 mg daily lamoTRIgine (LaMICtal) 25 mg tablet   No No   Sig: Take lamotrigine 200 mg (1 tablet) and a two 25 mg tablets (2 tablets) for a total of 250 mg daily   meclizine (ANTIVERT) 25 mg tablet   No No   Sig: Take 1 tablet (25 mg total) by mouth every 8 (eight) hours      Facility-Administered Medications: None       Past Medical History:   Diagnosis Date   • Abdominal pain    • Anxiety    • Asthma     as young child   • Bipolar disorder ([address])    • Chronic pain disorder     right hip   • Depression    • Diarrhea    • Endometrial disorder 12/2012   • Endometriosis    • Epigastric pain    • Fatigue    • History of COVID-19 1/3/2022    On 12/25/21   • Irregular menses 9/11/2014   • Morbid obesity (HCC)    • Nausea and vomiting    • Yeast infection 8/23/2017       Past Surgical History:   Procedure Laterality Date   • DIAGNOSTIC LAPAROSCOPY     • DIAGNOSTIC LAPAROSCOPY     • DILATION AND CURETTAGE OF UTERUS     • SC COLONOSCOPY FLX DX W/COLLJ SPEC WHEN PFRMD N/A 8/4/2017    Procedure: EGD with bx AND COLONOSCOPY with bx;  Surgeon: Alyson Valdez MD;  Location: [facility]; Service: Gastroenterology   • SC LAPAROSCOPY W/RMVL ADNEXAL STRUCTURES Bilateral 5/4/2021    Procedure: LAP SALPINGECTOMY;  fulgeration of endometriosis; Surgeon: Lilly Rajput MD;  Location: [facility];  Service: Gynecology       Family History   Problem Relation Age of Onset   • No Known Problems Mother    • No Known Problems Father      I have reviewed and agree with the history as documented.     E-Cigarette/Vaping   • E-Cigarette Use Never User      E-Cigarette/Vaping Substances   • Nicotine No    • THC No    • CBD No    • Flavoring No    • Other No    • Unknown No      Social History     Tobacco Use   • Smoking status: Every Day     Packs/day: 0.50     Types: Cigarettes   • Smokeless tobacco: Never   • Tobacco comments:     1/2 pack/ week   Vaping Use   • Vaping Use: Never used   Substance Use Topics   • Alcohol use: Yes     Comment: 2-3 glass/day wine or liquor   • Drug use: Yes     Types: Marijuana     Comment: smokes daily       Review of Systems   Constitutional: Negative for chills and fever. Respiratory: Negative for cough and shortness of breath. Cardiovascular: Positive for chest pain and palpitations. Negative for leg swelling. Gastrointestinal: Negative for abdominal pain, diarrhea, nausea and vomiting. Neurological: Positive for light-headedness (Yesterday). Negative for headaches. Physical Exam  Physical Exam  Vitals and nursing note reviewed. Constitutional:       General: She is not in acute distress. Appearance: She is well-developed. She is not ill-appearing, toxic-appearing or diaphoretic. HENT:      Head: Normocephalic and atraumatic. Eyes:      General: No scleral icterus. Conjunctiva/sclera:      Right eye: Right conjunctiva is not injected. Left eye: Left conjunctiva is not injected. Neck:      Vascular: No JVD. Trachea: Trachea normal.   Cardiovascular:      Rate and Rhythm: Normal rate and regular rhythm. Pulses: Normal pulses. Heart sounds: Normal heart sounds. No murmur heard. No friction rub. Pulmonary:      Effort: Pulmonary effort is normal. No accessory muscle usage or respiratory distress. Breath sounds: Normal breath sounds. No stridor. No wheezing, rhonchi or rales. Chest:      Chest wall: No tenderness. Abdominal:      General: There is no distension. Palpations: Abdomen is soft. Abdomen is not rigid. Tenderness: There is no abdominal tenderness. There is no guarding or rebound. Musculoskeletal:         General: No swelling. Cervical back: Normal range of motion. Skin:     General: Skin is warm and dry. Coloration: Skin is not pale. Findings: No rash. Neurological:      Mental Status: She is alert. GCS: GCS eye subscore is 4. GCS verbal subscore is 5. GCS motor subscore is 6. Motor: No weakness (Grossly intact).          Vital Signs  ED Triage Vitals   Temperature Pulse Respirations Blood Pressure SpO2   09/19/23 1830 09/19/23 1820 09/19/23 1820 09/19/23 1820 09/19/23 1820   98.2 °F (36.8 °C) 72 18 132/75 100 %      Temp Source Heart Rate Source Patient Position - Orthostatic VS BP Location FiO2 (%)   09/19/23 1830 09/19/23 1820 09/19/23 1820 09/19/23 1820 --   Oral Monitor Lying Right arm       Pain Score       09/19/23 1907       5           Vitals:    09/19/23 1820 09/19/23 2023   BP: 132/75 110/68   Pulse: 72 69   Patient Position - Orthostatic VS: Lying Lying         Visual Acuity      ED Medications  Medications   ketorolac (TORADOL) injection 15 mg (15 mg Intravenous Given 9/19/23 1907)       Diagnostic Studies  Results Reviewed     Procedure Component Value Units Date/Time    TSH, 3rd generation with Free T4 reflex [[id number]]  (Normal) Collected: 09/19/23 1901    Lab Status: Final result Specimen: Blood from Arm, Left Updated: 09/19/23 1942     TSH 3RD GENERATON 0.975 uIU/mL     HS Troponin 0hr (reflex protocol) [[id number]]  (Normal) Collected: 09/19/23 1901    Lab Status: Final result Specimen: Blood from Arm, Left Updated: 09/19/23 1937     hs TnI 0hr <2 ng/L     Basic metabolic panel [[id number]] Collected: 09/19/23 1901    Lab Status: Final result Specimen: Blood from Arm, Left Updated: 09/19/23 1927     Sodium 136 mmol/L      Potassium 3.9 mmol/L      Chloride 104 mmol/L      CO2 30 mmol/L      ANION GAP 2 mmol/L      BUN 12 mg/dL      Creatinine 0.75 mg/dL      Glucose 95 mg/dL      Calcium 9.2 mg/dL      eGFR 106 ml/min/1.73sq m     Narrative:      [place] guidelines for Chronic Kidney Disease (CKD):   •  Stage 1 with normal or high GFR (GFR > 90 mL/min/1.73 square meters)  •  Stage 2 Mild CKD (GFR = 60-89 mL/min/1.73 square meters)  •  Stage 3A Moderate CKD (GFR = 45-59 mL/min/1.73 square meters)  •  Stage 3B Moderate CKD (GFR = 30-44 mL/min/1.73 square meters)  •  Stage 4 Severe CKD (GFR = 15-29 mL/min/1.73 square meters)  •  Stage 5 End Stage CKD (GFR <15 mL/min/1.73 square meters)  Note: GFR calculation is accurate only with a steady state creatinine    Magnesium [[id number]]  (Normal) Collected: 09/19/23 1901    Lab Status: Final result Specimen: Blood from Arm, Left Updated: 09/19/23 1927     Magnesium 2.0 mg/dL     CBC and differential [[id number]] Collected: 09/19/23 1901    Lab Status: Final result Specimen: Blood from Arm, Left Updated: 09/19/23 1910     WBC 7.97 Thousand/uL      RBC 4.08 Million/uL      Hemoglobin 12.5 g/dL      Hematocrit 38.7 %      MCV 95 fL      MCH 30.6 pg      MCHC 32.3 g/dL      RDW 12.7 %      MPV 9.7 fL      Platelets 647 Thousands/uL      nRBC 0 /100 WBCs      Neutrophils Relative 60 %      Immat GRANS % 0 %      Lymphocytes Relative 27 %      Monocytes Relative 9 %      Eosinophils Relative 3 %      Basophils Relative 1 %      Neutrophils Absolute 4.84 Thousands/µL      Immature Grans Absolute 0.03 Thousand/uL      Lymphocytes Absolute 2.13 Thousands/µL      Monocytes Absolute 0.71 Thousand/µL      Eosinophils Absolute 0.22 Thousand/µL      Basophils Absolute 0.04 Thousands/µL                  XR chest 2 views   ED Interpretation by DO Donita (09/19 1942)   Interpreted by me as no acute abnormalities                 Procedures  ECG 12 Lead Documentation Only    Date/Time: 9/19/2023 6:30 PM    Performed by: DO Donita  Authorized by: DO Donita    Indications / Diagnosis:  Palpitations  ECG reviewed by me, the ED Provider: yes    Patient location:  Bedside  Previous ECG:     Previous ECG:  Compared to current    Comparison ECG info:  11/27/21    Similarity:  No change  Rate:     ECG rate:  66    ECG rate assessment: normal    Rhythm:     Rhythm: sinus rhythm    Ectopy:     Ectopy: none    QRS:     QRS axis:  Normal    QRS intervals:  Normal  ST segments:     ST segments:  Normal  T waves:     T waves: normal               ED Course  ED Course as of 09/19/23 2035   Tue Sep 19, 2023   1825 Pulse: 72  Normal   1942 hs TnI 0hr: <2  Negative   1942 Hemoglobin: 12.5  Normal   1782 Basic metabolic panel  Normal   3136 Magnesium: 2.0  Normal   1943 TSH 3RD GENERATON: 0.975  Normal   1948 Updated pt on labs/CXR result. Pt has a cardiologist that she will f/u with. HEART Risk Score    Flowsheet Row Most Recent Value   Heart Score Risk Calculator    History 0 Filed at: 09/19/2023 2022   ECG 0 Filed at: 09/19/2023 2022   Age 0 Filed at: 09/19/2023 2022   Risk Factors 0 Filed at: 09/19/2023 2022   Troponin 0 Filed at: 09/19/2023 2022   HEART Score 0 Filed at: 09/19/2023 2022                        SBIRT 20yo+    Flowsheet Row Most Recent Value   Initial Alcohol Screen: US AUDIT-C     1. How often do you have a drink containing alcohol? 0 Filed at: 09/19/2023 1830   2. How many drinks containing alcohol do you have on a typical day you are drinking? 0 Filed at: 09/19/2023 1830   3b. FEMALE Any Age, or MALE 65+: How often do you have 4 or more drinks on one occassion? 0 Filed at: 09/19/2023 1830   Audit-C Score 0 Filed at: 09/19/2023 1830   BRUNO: How many times in the past year have you. .. Used an illegal drug or used a prescription medication for non-medical reasons? Never Filed at: 09/19/2023 1830                    Medical Decision Making  Will check CBC to r/o anemia, BMP/Mg to r/o lyte abnormality, TSH to r/o thyroid dysfunction, troponin to assess for NSTEMI, EKG to r/o STEMI/ischemic changes/arrhythmia, CXR to r/o PTX/PNA/pulmonary edema/effusion. Amount and/or Complexity of Data Reviewed  Labs: ordered. Decision-making details documented in ED Course. Radiology: ordered and independent interpretation performed. Risk  Prescription drug management.           Disposition  Final diagnoses:   Palpitations   Chest pain     Time reflects when diagnosis was documented in both MDM as applicable and the Disposition within this note Time User Action Codes Description Comment    9/19/2023  7:48 PM Josefa Hoskins [R00.2] Palpitations     9/19/2023  7:48 PM Jourdan Crump [R07.9] Chest pain       ED Disposition     ED Disposition   Discharge    Condition   Stable    Date/Time   Tue Sep 19, 2023  7:49 PM    Comment   Dennie Donning discharge to home/self care. Follow-up Information     Follow up With Specialties Details Why Contact Info    Your cardiologist  Schedule an appointment as soon as possible for a visit             Discharge Medication List as of 9/19/2023  7:49 PM      CONTINUE these medications which have NOT CHANGED    Details   amphetamine-dextroamphetamine (ADDERALL XR, 20MG,) 20 MG 24 hr capsule Take 1 capsule (20 mg total) by mouth every morning Max Daily Amount: 20 mg, Starting Tue 9/19/2023, Until Thu 10/19/2023, Normal      aspirin-acetaminophen-caffeine (EXCEDRIN MIGRAINE) 250-250-65 MG per tablet Take 1 tablet by mouth every 6 (six) hours as needed for headaches As needed., Historical Med      hydrOXYzine HCL (ATARAX) 25 mg tablet TAKE 1 TABLET BY MOUTH EVERY 6 HOURS AS NEEDED FOR ANXIETY., Starting Wed 9/6/2023, Normal      ibuprofen (MOTRIN) 800 mg tablet TOME REMBERTO TABLETA POR V A ORAL CADA SEIS HORAS CUANDO SEA NECESARIO PAIN, Historical Med      !! lamoTRIgine (LaMICtal) 200 MG tablet Take lamotrigine 200 mg (1 tablet) and a two 25 mg tablets (2 tablets) for a total of 250 mg daily, Normal      !! lamoTRIgine (LaMICtal) 25 mg tablet Take lamotrigine 200 mg (1 tablet) and a two 25 mg tablets (2 tablets) for a total of 250 mg daily, Normal      meclizine (ANTIVERT) 25 mg tablet Take 1 tablet (25 mg total) by mouth every 8 (eight) hours, Starting Tue 2/1/2022, Normal       !! - Potential duplicate medications found. Please discuss with provider. No discharge procedures on file.     PDMP Review       Value Time User    PDMP Reviewed  Yes 9/19/2023 12:41 PM Loretta Snyder MD          ED Provider  Electronically Signed by           [address] [place] [address],   09/19/23 2037

## 2023-09-19 NOTE — TELEPHONE ENCOUNTER
Patient requested refill for:  Requested Prescriptions     Pending Prescriptions Disp Refills   • amphetamine-dextroamphetamine (ADDERALL XR, 20MG,) 20 MG 24 hr capsule 30 capsule 0     Sig: Take 1 capsule (20 mg total) by mouth every morning Max Daily Amount: 20 mg       Refill request was sent to Dr. Ashely Arenas, my indirect supervisor, who will review and decide to approve/send to pharmacy. PDMP reviewed on 09/19/23. Luz Maria Woodruff has been appropriately adherent to their controlled psychotropic medication regimen in the absence of apparent abuse or misuse. Therefore, this writer will send a 30-day refill to their pharmacy of choice including recommendation for patient to adhere to their outpatient appointment(s) with this writer to assure appropriate continuity of care.       Stefan Montejo MD

## 2023-09-19 NOTE — TELEPHONE ENCOUNTER
Ella Medrano called the clinic today and appropriately requested refill of controlled psychotropic medications (Adderall). Review of PDMP website reveals no concerns for abuse or misuse. As such, will refill script today for 30-days as I am covering for patient's primary provider.

## 2023-09-19 NOTE — TELEPHONE ENCOUNTER
This was a patient of Dr Lauren Burch who was seen  June of 2022 and was to come back in three months but never came back. She is calling today complaining of severe palpitations causing "funny feeling in her chest". She is asking for an immediate or much sooner appointment than the soonest opening in our office. Recommended  ED.since patient states she cannot wait that long.

## 2023-09-20 ENCOUNTER — VBI (OUTPATIENT)
Dept: ADMINISTRATIVE | Facility: OTHER | Age: 33
End: 2023-09-20

## 2023-09-20 ENCOUNTER — TELEPHONE (OUTPATIENT)
Dept: CARDIOLOGY CLINIC | Facility: CLINIC | Age: 33
End: 2023-09-20

## 2023-09-20 LAB
ATRIAL RATE: 66 BPM
P AXIS: 52 DEGREES
PR INTERVAL: 140 MS
QRS AXIS: 60 DEGREES
QRSD INTERVAL: 88 MS
QT INTERVAL: 394 MS
QTC INTERVAL: 413 MS
T WAVE AXIS: 25 DEGREES
VENTRICULAR RATE: 66 BPM

## 2023-09-20 PROCEDURE — 93010 ELECTROCARDIOGRAM REPORT: CPT

## 2023-09-20 NOTE — TELEPHONE ENCOUNTER
Pt has an appt scheduled on 11/6/23. ..she was seen in Ed yesterday at [facility] for palpitations and would like to know if anything to do before upcoming OV. Advised to avoid caffeine. LM told would ask Dr Rakesh Fleming to advise if any thing else prior to appt.

## 2023-09-20 NOTE — TELEPHONE ENCOUNTER
Anderson Celeste    ED Visit Information     Ed visit date: 9/19/2023  Diagnosis Description: Palpitations  In Network? Yes [facility]  Discharge status: Home  Discharged with meds ? No  Number of ED visits to date: 1  ED Severity:3     Outreach Information    Outreach successful: Yes 1  Date letter mailed:n/a  Date Finalized:9/20/2023    Care Coordination    Follow up appointment with specialilty: yes on 11/06/2023   Transportation issues ?  No    Value [employer] type: Vamshi Phams PCP: Yes  Transportation: Self Transport  Called PCP first?: No  Feels able to call PCP for urgent problems ?: Yes  Understands what emergencies can be handled by PCP ?: Yes  Ever any problems getting appointment with PCP for minor emergency/urgency problems?: No  Practice Contacted Patient ?: No  Pt had ED follow up with pcp/staff ?: No    Seen for follow-up out of network ?: No  Reason Patient went to ED instead of Urgent Care or PCP?: Perceived Severity of Illness  Urgent care Education?: No  09/20/2023 12:51 PM EDT by Martin Pritchett 09/20/2023 12:51 PM EDT by Martin Caban Af, [address] [facility] (Self) [phone] (CKELOU)[phone] (Mobile)  [phone] (Mobile) Remove  - Call Complete

## 2023-09-27 DIAGNOSIS — R00.2 PALPITATIONS: Primary | ICD-10-CM

## 2023-09-27 DIAGNOSIS — R07.2 PRECORDIAL PAIN: ICD-10-CM

## 2023-09-27 NOTE — PROGRESS NOTES
Patient presented to the emergency department for palpitations and chest discomfort. The palpitations lasted for over 40 hours. She also was having discomfort in the chest that was associated with sweating and lightheadedness. Will order stress test, echocardiogram and 2-week event recorder. Follow-up in the office after testing. Notification sent to office to schedule testing.

## 2023-09-27 NOTE — PROGRESS NOTES
Pt called with recommendations from Dr Alison Bhatt. She will call to schedule echo and stress test.  She was told the zio would be sent to her home. Pt verbalized understanding and is willing to complete testing.

## 2023-10-03 ENCOUNTER — HOSPITAL ENCOUNTER (EMERGENCY)
Facility: HOSPITAL | Age: 33
Discharge: HOME | End: 2023-10-03
Attending: EMERGENCY MEDICINE
Payer: MEDICARE

## 2023-10-03 ENCOUNTER — APPOINTMENT (EMERGENCY)
Dept: RADIOLOGY | Facility: HOSPITAL | Age: 33
End: 2023-10-03
Payer: MEDICARE

## 2023-10-03 VITALS
SYSTOLIC BLOOD PRESSURE: 104 MMHG | RESPIRATION RATE: 20 BRPM | HEART RATE: 82 BPM | WEIGHT: 176.37 LBS | TEMPERATURE: 98.1 F | BODY MASS INDEX: 29.35 KG/M2 | DIASTOLIC BLOOD PRESSURE: 60 MMHG | OXYGEN SATURATION: 98 %

## 2023-10-03 DIAGNOSIS — R00.2 PALPITATIONS: ICD-10-CM

## 2023-10-03 DIAGNOSIS — R07.89 NON-CARDIAC CHEST PAIN: Primary | ICD-10-CM

## 2023-10-03 LAB
ALBUMIN SERPL BCP-MCNC: 4.3 G/DL (ref 3.5–5)
ALP SERPL-CCNC: 47 U/L (ref 34–104)
ALT SERPL W P-5'-P-CCNC: 16 U/L (ref 7–52)
ANION GAP SERPL CALCULATED.3IONS-SCNC: 5 MMOL/L
AST SERPL W P-5'-P-CCNC: 14 U/L (ref 13–39)
BASOPHILS # BLD AUTO: 0.03 THOUSANDS/ÂΜL (ref 0–0.1)
BASOPHILS NFR BLD AUTO: 0 % (ref 0–1)
BILIRUB SERPL-MCNC: 0.63 MG/DL (ref 0.2–1)
BUN SERPL-MCNC: 8 MG/DL (ref 5–25)
CALCIUM SERPL-MCNC: 9.3 MG/DL (ref 8.4–10.2)
CARDIAC TROPONIN I PNL SERPL HS: <2 NG/L
CHLORIDE SERPL-SCNC: 102 MMOL/L (ref 96–108)
CO2 SERPL-SCNC: 29 MMOL/L (ref 21–32)
CREAT SERPL-MCNC: 0.77 MG/DL (ref 0.6–1.3)
EOSINOPHIL # BLD AUTO: 0.04 THOUSAND/ÂΜL (ref 0–0.61)
EOSINOPHIL NFR BLD AUTO: 1 % (ref 0–6)
ERYTHROCYTE [DISTWIDTH] IN BLOOD BY AUTOMATED COUNT: 12.4 % (ref 11.6–15.1)
GFR SERPL CREATININE-BSD FRML MDRD: 103 ML/MIN/1.73SQ M
GLUCOSE SERPL-MCNC: 116 MG/DL (ref 65–140)
HCT VFR BLD AUTO: 38.7 % (ref 34.8–46.1)
HGB BLD-MCNC: 12.6 G/DL (ref 11.5–15.4)
IMM GRANULOCYTES # BLD AUTO: 0.02 THOUSAND/UL (ref 0–0.2)
IMM GRANULOCYTES NFR BLD AUTO: 0 % (ref 0–2)
LYMPHOCYTES # BLD AUTO: 1.99 THOUSANDS/ÂΜL (ref 0.6–4.47)
LYMPHOCYTES NFR BLD AUTO: 28 % (ref 14–44)
MCH RBC QN AUTO: 30.6 PG (ref 26.8–34.3)
MCHC RBC AUTO-ENTMCNC: 32.6 G/DL (ref 31.4–37.4)
MCV RBC AUTO: 94 FL (ref 82–98)
MONOCYTES # BLD AUTO: 0.54 THOUSAND/ÂΜL (ref 0.17–1.22)
MONOCYTES NFR BLD AUTO: 8 % (ref 4–12)
NEUTROPHILS # BLD AUTO: 4.62 THOUSANDS/ÂΜL (ref 1.85–7.62)
NEUTS SEG NFR BLD AUTO: 63 % (ref 43–75)
NRBC BLD AUTO-RTO: 0 /100 WBCS
PLATELET # BLD AUTO: 243 THOUSANDS/UL (ref 149–390)
PMV BLD AUTO: 10.4 FL (ref 8.9–12.7)
POTASSIUM SERPL-SCNC: 4 MMOL/L (ref 3.5–5.3)
PROT SERPL-MCNC: 7.1 G/DL (ref 6.4–8.4)
RBC # BLD AUTO: 4.12 MILLION/UL (ref 3.81–5.12)
SODIUM SERPL-SCNC: 136 MMOL/L (ref 135–147)
WBC # BLD AUTO: 7.24 THOUSAND/UL (ref 4.31–10.16)

## 2023-10-03 PROCEDURE — 36415 COLL VENOUS BLD VENIPUNCTURE: CPT

## 2023-10-03 PROCEDURE — 85025 COMPLETE CBC W/AUTO DIFF WBC: CPT | Performed by: EMERGENCY MEDICINE

## 2023-10-03 PROCEDURE — 96374 THER/PROPH/DIAG INJ IV PUSH: CPT

## 2023-10-03 PROCEDURE — 71045 X-RAY EXAM CHEST 1 VIEW: CPT

## 2023-10-03 PROCEDURE — 84484 ASSAY OF TROPONIN QUANT: CPT | Performed by: EMERGENCY MEDICINE

## 2023-10-03 PROCEDURE — 99285 EMERGENCY DEPT VISIT HI MDM: CPT

## 2023-10-03 PROCEDURE — 80053 COMPREHEN METABOLIC PANEL: CPT | Performed by: EMERGENCY MEDICINE

## 2023-10-03 PROCEDURE — 99285 EMERGENCY DEPT VISIT HI MDM: CPT | Performed by: PHYSICIAN ASSISTANT

## 2023-10-03 PROCEDURE — 93005 ELECTROCARDIOGRAM TRACING: CPT

## 2023-10-03 PROCEDURE — 96375 TX/PRO/DX INJ NEW DRUG ADDON: CPT

## 2023-10-03 RX ORDER — KETOROLAC TROMETHAMINE 30 MG/ML
15 INJECTION, SOLUTION INTRAMUSCULAR; INTRAVENOUS ONCE
Status: COMPLETED | OUTPATIENT
Start: 2023-10-03 | End: 2023-10-03

## 2023-10-03 RX ORDER — FAMOTIDINE 10 MG/ML
20 INJECTION, SOLUTION INTRAVENOUS ONCE
Status: COMPLETED | OUTPATIENT
Start: 2023-10-03 | End: 2023-10-03

## 2023-10-03 RX ORDER — ONDANSETRON 2 MG/ML
4 INJECTION INTRAMUSCULAR; INTRAVENOUS ONCE
Status: COMPLETED | OUTPATIENT
Start: 2023-10-03 | End: 2023-10-03

## 2023-10-03 RX ADMIN — ONDANSETRON 4 MG: 2 INJECTION INTRAMUSCULAR; INTRAVENOUS at 21:38

## 2023-10-03 RX ADMIN — KETOROLAC TROMETHAMINE 15 MG: 30 INJECTION, SOLUTION INTRAMUSCULAR; INTRAVENOUS at 21:48

## 2023-10-03 RX ADMIN — FAMOTIDINE 20 MG: 10 INJECTION INTRAVENOUS at 21:42

## 2023-10-04 ENCOUNTER — VBI (OUTPATIENT)
Dept: FAMILY MEDICINE CLINIC | Facility: CLINIC | Age: 33
End: 2023-10-04

## 2023-10-04 LAB
ATRIAL RATE: 86 BPM
P AXIS: 63 DEGREES
PR INTERVAL: 142 MS
QRS AXIS: 62 DEGREES
QRSD INTERVAL: 88 MS
QT INTERVAL: 376 MS
QTC INTERVAL: 449 MS
T WAVE AXIS: 43 DEGREES
VENTRICULAR RATE: 86 BPM

## 2023-10-04 PROCEDURE — 93010 ELECTROCARDIOGRAM REPORT: CPT | Performed by: INTERNAL MEDICINE

## 2023-10-04 NOTE — TELEPHONE ENCOUNTER
10/04/23 10:09 AM    Patient contacted post ED visit, [facility] department spoke with patient/caregiver and outreach was successful. Thank you.   Brinda Cedillo  [facility]

## 2023-10-04 NOTE — ED PROVIDER NOTES
History  Chief Complaint   Patient presents with   • Chest Pain     Patient reports sharp/burning chest pain for last couple hours, patient reports sensation of heart palpations for last 2 weeks. This is a 34year old female with PMHx of anxiety, asthma, bipolar disorder, presenting to the ED for evaluation of chest pain. Patient states that the chest pain is located to the right side of the chest, is now radiating to the middle of the chest.  States the pain causes her to catch her breath. States it feels like a burning sensation. She has been dealing with palpitations for multiple weeks now, currently has some. Patient has an appointment with cardiology follow-up in November. Patient also endorses a headache at this time. She states that she felt lightheaded earlier. She states that she was not doing anything when symptoms started today. Denies any provoking symptoms. No pain medications prior to arrival.      History provided by:  Patient   used: No        Prior to Admission Medications   Prescriptions Last Dose Informant Patient Reported? Taking? amphetamine-dextroamphetamine (ADDERALL XR, 20MG,) 20 MG 24 hr capsule   No No   Sig: Take 1 capsule (20 mg total) by mouth every morning Max Daily Amount: 20 mg   aspirin-acetaminophen-caffeine (EXCEDRIN MIGRAINE) 250-250-65 MG per tablet  Self Yes No   Sig: Take 1 tablet by mouth every 6 (six) hours as needed for headaches As needed. hydrOXYzine HCL (ATARAX) 25 mg tablet   No No   Sig: TAKE 1 TABLET BY MOUTH EVERY 6 HOURS AS NEEDED FOR ANXIETY.    ibuprofen (MOTRIN) 800 mg tablet   Yes No   Sig: TOME REMBERTO TABLETA POR V A ORAL CADA SEIS HORAS CUANDO SEA NECESARIO PAIN   lamoTRIgine (LaMICtal) 200 MG tablet   No No   Sig: Take lamotrigine 200 mg (1 tablet) and a two 25 mg tablets (2 tablets) for a total of 250 mg daily   lamoTRIgine (LaMICtal) 25 mg tablet   No No   Sig: Take lamotrigine 200 mg (1 tablet) and a two 25 mg tablets (2 tablets) for a total of 250 mg daily   meclizine (ANTIVERT) 25 mg tablet   No No   Sig: Take 1 tablet (25 mg total) by mouth every 8 (eight) hours      Facility-Administered Medications: None       Past Medical History:   Diagnosis Date   • Abdominal pain    • Anxiety    • Asthma     as young child   • Bipolar disorder ([address])    • Chronic pain disorder     right hip   • Depression    • Diarrhea    • Endometrial disorder 12/2012   • Endometriosis    • Epigastric pain    • Fatigue    • History of COVID-19 1/3/2022    On 12/25/21   • Irregular menses 9/11/2014   • Morbid obesity (HCC)    • Nausea and vomiting    • Yeast infection 8/23/2017       Past Surgical History:   Procedure Laterality Date   • DIAGNOSTIC LAPAROSCOPY     • DIAGNOSTIC LAPAROSCOPY     • DILATION AND CURETTAGE OF UTERUS     • MN COLONOSCOPY FLX DX W/COLLJ SPEC WHEN PFRMD N/A 8/4/2017    Procedure: EGD with bx AND COLONOSCOPY with bx;  Surgeon: Dawit Montoya MD;  Location: [facility]; Service: Gastroenterology   • MN LAPAROSCOPY W/RMVL ADNEXAL STRUCTURES Bilateral 5/4/2021    Procedure: LAP SALPINGECTOMY;  fulgeration of endometriosis; Surgeon: Chinedu Anaya MD;  Location: [facility];  Service: Gynecology       Family History   Problem Relation Age of Onset   • No Known Problems Mother    • No Known Problems Father      I have reviewed and agree with the history as documented.     E-Cigarette/Vaping   • E-Cigarette Use Never User      E-Cigarette/Vaping Substances   • Nicotine No    • THC No    • CBD No    • Flavoring No    • Other No    • Unknown No      Social History     Tobacco Use   • Smoking status: Every Day     Packs/day: 0.50     Types: Cigarettes   • Smokeless tobacco: Never   • Tobacco comments:     1/2 pack/ week   Vaping Use   • Vaping Use: Never used   Substance Use Topics   • Alcohol use: Yes     Comment: 2-3 glass/day wine or liquor   • Drug use: Yes     Types: Marijuana     Comment: smokes daily       Review of Systems   Respiratory: Positive for shortness of breath. Cardiovascular: Positive for chest pain and palpitations. Negative for leg swelling. Gastrointestinal: Positive for nausea. All other systems reviewed and are negative. Physical Exam  Physical Exam  Vitals reviewed. Constitutional:       General: She is not in acute distress. Appearance: Normal appearance. She is well-developed and well-groomed. She is not ill-appearing, toxic-appearing or diaphoretic. Comments: Tearful on exam   HENT:      Head: Normocephalic and atraumatic. Right Ear: External ear normal.      Left Ear: External ear normal.      Nose: Nose normal. No congestion or rhinorrhea. Mouth/Throat:      Mouth: Mucous membranes are moist.      Pharynx: Oropharynx is clear. No oropharyngeal exudate or posterior oropharyngeal erythema. Eyes:      General: No scleral icterus. Right eye: No discharge. Left eye: No discharge. Extraocular Movements: Extraocular movements intact. Conjunctiva/sclera: Conjunctivae normal.   Cardiovascular:      Rate and Rhythm: Normal rate and regular rhythm. Pulses: Normal pulses. Heart sounds: No murmur heard. No friction rub. No gallop. Pulmonary:      Effort: Pulmonary effort is normal. No respiratory distress. Breath sounds: Normal breath sounds. No wheezing, rhonchi or rales. Abdominal:      General: Abdomen is flat. There is no distension. Palpations: Abdomen is soft. Tenderness: There is no abdominal tenderness. There is no right CVA tenderness, left CVA tenderness, guarding or rebound. Musculoskeletal:         General: No deformity. Normal range of motion. Cervical back: Normal range of motion and neck supple. Skin:     General: Skin is warm and dry. Coloration: Skin is not jaundiced or pale. Findings: No rash. Neurological:      General: No focal deficit present. Mental Status: She is alert.    Psychiatric:         Mood and Affect: Mood normal.         Behavior: Behavior normal. Behavior is cooperative.          Vital Signs  ED Triage Vitals [10/03/23 2000]   Temperature Pulse Respirations Blood Pressure SpO2   98.1 °F (36.7 °C) 80 20 117/80 99 %      Temp Source Heart Rate Source Patient Position - Orthostatic VS BP Location FiO2 (%)   Oral Monitor Lying Left arm --      Pain Score       7           Vitals:    10/03/23 2000 10/03/23 2209   BP: 117/80 104/60   Pulse: 80 82   Patient Position - Orthostatic VS: Lying Lying         Visual Acuity      ED Medications  Medications   ketorolac (TORADOL) injection 15 mg (15 mg Intravenous Given 10/3/23 2148)   Famotidine (PF) (PEPCID) injection 20 mg (20 mg Intravenous Given 10/3/23 2142)   ondansetron (ZOFRAN) injection 4 mg (4 mg Intravenous Given 10/3/23 2138)       Diagnostic Studies  Results Reviewed     Procedure Component Value Units Date/Time    HS Troponin 0hr (reflex protocol) [[id number]]  (Normal) Collected: 10/03/23 2101    Lab Status: Final result Specimen: Blood from Arm, Right Updated: 10/03/23 2142     hs TnI 0hr <2 ng/L     Comprehensive metabolic panel [[id number]] Collected: 10/03/23 2101    Lab Status: Final result Specimen: Blood from Arm, Right Updated: 10/03/23 2137     Sodium 136 mmol/L      Potassium 4.0 mmol/L      Chloride 102 mmol/L      CO2 29 mmol/L      ANION GAP 5 mmol/L      BUN 8 mg/dL      Creatinine 0.77 mg/dL      Glucose 116 mg/dL      Calcium 9.3 mg/dL      AST 14 U/L      ALT 16 U/L      Alkaline Phosphatase 47 U/L      Total Protein 7.1 g/dL      Albumin 4.3 g/dL      Total Bilirubin 0.63 mg/dL      eGFR 103 ml/min/1.73sq m     Narrative:      [place] guidelines for Chronic Kidney Disease (CKD):   •  Stage 1 with normal or high GFR (GFR > 90 mL/min/1.73 square meters)  •  Stage 2 Mild CKD (GFR = 60-89 mL/min/1.73 square meters)  •  Stage 3A Moderate CKD (GFR = 45-59 mL/min/1.73 square meters)  •  Stage 3B Moderate CKD (GFR = 30-44 mL/min/1.73 square meters)  •  Stage 4 Severe CKD (GFR = 15-29 mL/min/1.73 square meters)  •  Stage 5 End Stage CKD (GFR <15 mL/min/1.73 square meters)  Note: GFR calculation is accurate only with a steady state creatinine    CBC and differential [[id number]] Collected: 10/03/23 2101    Lab Status: Final result Specimen: Blood from Arm, Right Updated: 10/03/23 2107     WBC 7.24 Thousand/uL      RBC 4.12 Million/uL      Hemoglobin 12.6 g/dL      Hematocrit 38.7 %      MCV 94 fL      MCH 30.6 pg      MCHC 32.6 g/dL      RDW 12.4 %      MPV 10.4 fL      Platelets 433 Thousands/uL      nRBC 0 /100 WBCs      Neutrophils Relative 63 %      Immat GRANS % 0 %      Lymphocytes Relative 28 %      Monocytes Relative 8 %      Eosinophils Relative 1 %      Basophils Relative 0 %      Neutrophils Absolute 4.62 Thousands/µL      Immature Grans Absolute 0.02 Thousand/uL      Lymphocytes Absolute 1.99 Thousands/µL      Monocytes Absolute 0.54 Thousand/µL      Eosinophils Absolute 0.04 Thousand/µL      Basophils Absolute 0.03 Thousands/µL                  XR chest 1 view portable   ED Interpretation by Christen Garrido PA-C (10/03 2126)   No acute cardiopulmonary disease as interpreted by me at this time. Final Result by Shelby Smith MD (10/04 0368)      No acute cardiopulmonary disease.       Findings are stable            Workstation performed: [id number]                    Procedures  ECG 12 Lead Documentation Only    Date/Time: 10/3/2023 9:59 PM    Performed by: Christen Garrido PA-C  Authorized by: Christen Garrido PA-C    Indications / Diagnosis:  Chest pain  ECG reviewed by me, the ED Provider: yes    Patient location:  ED  Previous ECG:     Previous ECG:  Compared to current    Comparison to cardiac monitor: No    Interpretation:     Interpretation: normal    Quality:     Tracing quality:  Limited by artifact  Rate:     ECG rate:  86    ECG rate assessment: normal    Rhythm:     Rhythm: sinus rhythm Ectopy:     Ectopy: none    QRS:     QRS axis:  Normal    QRS intervals:  Normal  Conduction:     Conduction: normal    ST segments:     ST segments:  Normal  T waves:     T waves: normal               ED Course  ED Course as of 10/04/23 0633   Tue Oct 03, 2023   2129 Patient started with right-sided chest pain today. States it feels like a burning sensation. She has been dealing with palpitations for multiple weeks now, currently has some. Normal sinus rhythm on EKG, not tachycardic on the monitor. Patient has an appointment with cardiology follow-up. Patient also endorses a headache at this time. She states that she felt lightheaded earlier. She states that she was not doing anything when symptoms started today. Denies any provoking symptoms. No pain medications prior to arrival.               Medical Decision Making      DDX including but not limited to: chest wall pain, costochondritis, pleurisy, pericarditis, myocarditis, PTX, pneumonia, GI etiology; doubt ACS or MI or dissection or PE or rhabdomyolysis. Presenting to the ED for evaluation of right-sided chest pain, described as burning. Patient also endorses palpitations. She states that the pain gets of breath that causes her to catch her breath. Patient denies taking any pain medications prior to arrival.  Patient was seen for palpitations in our ED, discharged with cardiology follow-up. Patient states that she schedule an appointment with cardiology but does not have follow-up until November. Will order CBC for evaluation of anemia infection, CMP for evaluation of LFTs, kidney function and electrolyte abnormalities. Troponin for evaluation of ACS although low suspicion. EKG for evaluation of arrhythmia and ACS. Cardiac work-up negative. Discussed findings with patient. Patient tearful at this time, pt reports being frustrated that she has not gotten answers with what is wrong.  Discussed with patient that although I empathize with her frustration, she needs to f/u with cardiology for further work up. Cardiac workup in the ED is negative. Unlikely cardiac source for symptoms. I do not suspect PE or dissection based on history and exam. Educated on return precautions. Stable for discharge. Prior to discharge, discharge instructions were discussed with patient at bedside. Patient was provided both verbal and written instructions. Patient is understanding of the discharge instructions and is agreeable to plan of care. Return precautions were discussed with patient bedside, patient verbalized understanding of signs and symptoms that would necessitate return to the ED. All questions were answered. Patient was comfortable with the plan of care and discharged to home. Dispo: discharge home with follow up to Cardiology. Patient stable, in no acute distress and non-toxic at discharge. Non-cardiac chest pain: acute illness or injury  Palpitations: acute illness or injury  Amount and/or Complexity of Data Reviewed  Labs: ordered. Radiology: ordered and independent interpretation performed. Risk  Prescription drug management. Disposition  Final diagnoses:   Non-cardiac chest pain   Palpitations     Time reflects when diagnosis was documented in both MDM as applicable and the Disposition within this note     Time User Action Codes Description Comment    10/3/2023 10:01 PM Jairo Daily Add [R07.89] Non-cardiac chest pain     10/3/2023 10:01 PM Jairo Daily Add [R00.2] Palpitations       ED Disposition     ED Disposition   Discharge    Condition   Stable    Date/Time   Tue Oct 3, 2023  9:45 PM    [address] discharge to home/self care.                Follow-up Information     Follow up With Specialties Details Why Contact Info    GANGA Coombs Nurse Practitioner, Family Medicine Schedule an appointment as soon as possible for a visit  As needed [place]  [address]  [phone] Discharge Medication List as of 10/3/2023 10:27 PM      CONTINUE these medications which have NOT CHANGED    Details   amphetamine-dextroamphetamine (ADDERALL XR, 20MG,) 20 MG 24 hr capsule Take 1 capsule (20 mg total) by mouth every morning Max Daily Amount: 20 mg, Starting Tue 9/19/2023, Until Thu 10/19/2023, Normal      aspirin-acetaminophen-caffeine (EXCEDRIN MIGRAINE) 250-250-65 MG per tablet Take 1 tablet by mouth every 6 (six) hours as needed for headaches As needed., Historical Med      hydrOXYzine HCL (ATARAX) 25 mg tablet TAKE 1 TABLET BY MOUTH EVERY 6 HOURS AS NEEDED FOR ANXIETY., Starting Wed 9/6/2023, Normal      ibuprofen (MOTRIN) 800 mg tablet TOME REMBERTO TABLETA POR V A ORAL CADA SEIS HORAS CUANDO SEA NECESARIO PAIN, Historical Med      !! lamoTRIgine (LaMICtal) 200 MG tablet Take lamotrigine 200 mg (1 tablet) and a two 25 mg tablets (2 tablets) for a total of 250 mg daily, Normal      !! lamoTRIgine (LaMICtal) 25 mg tablet Take lamotrigine 200 mg (1 tablet) and a two 25 mg tablets (2 tablets) for a total of 250 mg daily, Normal      meclizine (ANTIVERT) 25 mg tablet Take 1 tablet (25 mg total) by mouth every 8 (eight) hours, Starting Tue 2/1/2022, Normal       !! - Potential duplicate medications found. Please discuss with provider. No discharge procedures on file.     PDMP Review       Value Time User    PDMP Reviewed  Yes 9/19/2023 12:41 PM Oswaldo Carvalho MD          ED Provider  Electronically Signed by [facility]ERT[facility]  10/04/23 7028

## 2023-10-16 ENCOUNTER — HOSPITAL ENCOUNTER (OUTPATIENT)
Dept: NON INVASIVE DIAGNOSTICS | Facility: CLINIC | Age: 33
Discharge: HOME | End: 2023-10-16
Payer: MEDICARE

## 2023-10-16 ENCOUNTER — TELEPHONE (OUTPATIENT)
Dept: CARDIOLOGY CLINIC | Facility: CLINIC | Age: 33
End: 2023-10-16

## 2023-10-16 VITALS
HEART RATE: 53 BPM | WEIGHT: 176 LBS | HEIGHT: 65 IN | BODY MASS INDEX: 29.32 KG/M2 | DIASTOLIC BLOOD PRESSURE: 60 MMHG | SYSTOLIC BLOOD PRESSURE: 104 MMHG

## 2023-10-16 VITALS
WEIGHT: 176 LBS | HEIGHT: 65 IN | HEART RATE: 58 BPM | DIASTOLIC BLOOD PRESSURE: 76 MMHG | OXYGEN SATURATION: 99 % | SYSTOLIC BLOOD PRESSURE: 104 MMHG | BODY MASS INDEX: 29.32 KG/M2

## 2023-10-16 DIAGNOSIS — R00.2 PALPITATIONS: ICD-10-CM

## 2023-10-16 DIAGNOSIS — R07.2 PRECORDIAL PAIN: ICD-10-CM

## 2023-10-16 LAB
AORTIC ROOT: 2.7 CM
APICAL FOUR CHAMBER EJECTION FRACTION: 55 %
ASCENDING AORTA: 2.7 CM
E WAVE DECELERATION TIME: 200 MS
E/A RATIO: 2.27
FRACTIONAL SHORTENING: 33 (ref 28–44)
INTERVENTRICULAR SEPTUM IN DIASTOLE (PARASTERNAL SHORT AXIS VIEW): 0.6 CM
INTERVENTRICULAR SEPTUM: 0.6 CM (ref 0.6–1.1)
LAAS-AP2: 13.4 CM2
LAAS-AP4: 15 CM2
LEFT ATRIUM SIZE: 3.8 CM
LEFT ATRIUM VOLUME (MOD BIPLANE): 33 ML
LEFT ATRIUM VOLUME INDEX (MOD BIPLANE): 17.6 ML/M2
LEFT INTERNAL DIMENSION IN SYSTOLE: 3.4 CM (ref 2.1–4)
LEFT VENTRICULAR INTERNAL DIMENSION IN DIASTOLE: 5.1 CM (ref 3.5–6)
LEFT VENTRICULAR POSTERIOR WALL IN END DIASTOLE: 0.6 CM
LEFT VENTRICULAR STROKE VOLUME: 77 ML
LVSV (TEICH): 77 ML
MAX HR PERCENT: 89 %
MAX HR: 169 BPM
MV E'TISSUE VEL-LAT: 20 CM/S
MV E'TISSUE VEL-SEP: 14 CM/S
MV PEAK A VEL: 0.41 M/S
MV PEAK E VEL: 93 CM/S
MV STENOSIS PRESSURE HALF TIME: 58 MS
MV VALVE AREA P 1/2 METHOD: 3.79
RATE PRESSURE PRODUCT: NORMAL
RIGHT ATRIUM AREA SYSTOLE A4C: 11.9 CM2
RIGHT VENTRICLE ID DIMENSION: 3.2 CM
SL CV LEFT ATRIUM LENGTH A2C: 4.8 CM
SL CV LV EF: 55
SL CV PED ECHO LEFT VENTRICLE DIASTOLIC VOLUME (MOD BIPLANE) 2D: 124 ML
SL CV PED ECHO LEFT VENTRICLE SYSTOLIC VOLUME (MOD BIPLANE) 2D: 47 ML
SL CV STRESS RECOVERY BP: NORMAL MMHG
SL CV STRESS RECOVERY HR: 73 BPM
SL CV STRESS RECOVERY O2 SAT: 98 %
SL CV STRESS STAGE REACHED: 4
STRESS ANGINA INDEX: 0
STRESS BASELINE BP: NORMAL MMHG
STRESS BASELINE HR: 58 BPM
STRESS O2 SAT REST: 99 %
STRESS PEAK HR: 161 BPM
STRESS POST ESTIMATED WORKLOAD: 13.4 METS
STRESS POST EXERCISE DUR MIN: 10 MIN
STRESS POST EXERCISE DUR SEC: 0 SEC
STRESS POST O2 SAT PEAK: 98 %
STRESS POST PEAK BP: 158 MMHG
TR MAX PG: 17 MMHG
TR PEAK VELOCITY: 2.1 M/S
TRICUSPID ANNULAR PLANE SYSTOLIC EXCURSION: 1.8 CM
TRICUSPID VALVE PEAK REGURGITATION VELOCITY: 2.09 M/S

## 2023-10-16 PROCEDURE — 93306 TTE W/DOPPLER COMPLETE: CPT

## 2023-10-16 PROCEDURE — 93306 TTE W/DOPPLER COMPLETE: CPT | Performed by: INTERNAL MEDICINE

## 2023-10-16 PROCEDURE — 93018 CV STRESS TEST I&R ONLY: CPT | Performed by: INTERNAL MEDICINE

## 2023-10-16 PROCEDURE — 93017 CV STRESS TEST TRACING ONLY: CPT

## 2023-10-16 PROCEDURE — 93016 CV STRESS TEST SUPVJ ONLY: CPT | Performed by: INTERNAL MEDICINE

## 2023-10-16 NOTE — TELEPHONE ENCOUNTER
Fax received from [facility] Management with an FMLA form for patient. Patient was last seen June 2022. Has an appointment 11/06/2023. Form will need to wait until patient is seen. Placed in folder.

## 2023-10-17 LAB
CHEST PAIN STATEMENT: NORMAL
MAX DIASTOLIC BP: 68 MMHG
MAX HEART RATE: 169 BPM
MAX PREDICTED HEART RATE: 189 BPM
MAX. SYSTOLIC BP: 158 MMHG
PROTOCOL NAME: NORMAL
REASON FOR TERMINATION: NORMAL
TARGET HR FORMULA: NORMAL
TEST INDICATION: NORMAL
TIME IN EXERCISE PHASE: NORMAL

## 2023-10-24 ENCOUNTER — TELEPHONE (OUTPATIENT)
Dept: PSYCHIATRY | Facility: CLINIC | Age: 33
End: 2023-10-24

## 2023-10-24 NOTE — TELEPHONE ENCOUNTER
Ileana Bertrand left message she is out of her medications, she requested refills for   ADDERALL XR, 20MG  LaMICtal 200 MG  LaMICtal 25 mg  hydroxyzine 25 mg    Send to [facility]   [facility], [employer]

## 2023-10-25 DIAGNOSIS — F41.1 GENERALIZED ANXIETY DISORDER: ICD-10-CM

## 2023-10-25 DIAGNOSIS — F31.81 BIPOLAR 2 DISORDER, MAJOR DEPRESSIVE EPISODE (HCC): ICD-10-CM

## 2023-10-25 DIAGNOSIS — F90.2 ADHD (ATTENTION DEFICIT HYPERACTIVITY DISORDER), COMBINED TYPE: ICD-10-CM

## 2023-10-25 RX ORDER — LAMOTRIGINE 200 MG/1
TABLET ORAL
Qty: 60 TABLET | Refills: 1 | Status: SHIPPED | OUTPATIENT
Start: 2023-10-25

## 2023-10-25 RX ORDER — DEXTROAMPHETAMINE SACCHARATE, AMPHETAMINE ASPARTATE MONOHYDRATE, DEXTROAMPHETAMINE SULFATE AND AMPHETAMINE SULFATE 5; 5; 5; 5 MG/1; MG/1; MG/1; MG/1
20 CAPSULE, EXTENDED RELEASE ORAL EVERY MORNING
Qty: 30 CAPSULE | Refills: 0
Start: 2023-10-25 | End: 2023-10-31 | Stop reason: SDUPTHER

## 2023-10-25 RX ORDER — HYDROXYZINE HYDROCHLORIDE 25 MG/1
25 TABLET, FILM COATED ORAL EVERY 6 HOURS PRN
Qty: 120 TABLET | Refills: 0 | Status: SHIPPED | OUTPATIENT
Start: 2023-10-25

## 2023-10-25 RX ORDER — LAMOTRIGINE 25 MG/1
TABLET ORAL
Qty: 120 TABLET | Refills: 1 | Status: SHIPPED | OUTPATIENT
Start: 2023-10-25

## 2023-10-25 NOTE — PROGRESS NOTES
Called patient on 10/25/2023 at 1056 to discuss medications. She has been fully compliant and understands the importance of continuing Lamictal and reaching out to physician if she misses doses in order to be retitrated up. She reported she had not missed any dosages and is ready for a refill, explaining she is asking for refills prior to running out of medication. Patient requested refill for:  Requested Prescriptions     Pending Prescriptions Disp Refills    lamoTRIgine (LaMICtal) 200 MG tablet 60 tablet 1     Sig: Take lamotrigine 200 mg (1 tablet) and a two 25 mg tablets (2 tablets) for a total of 250 mg daily    lamoTRIgine (LaMICtal) 25 mg tablet 120 tablet 1     Sig: Take lamotrigine 200 mg (1 tablet) and a two 25 mg tablets (2 tablets) for a total of 250 mg daily    hydrOXYzine HCL (ATARAX) 25 mg tablet 120 tablet 0     Sig: Take 1 tablet (25 mg total) by mouth every 6 (six) hours as needed for anxiety    amphetamine-dextroamphetamine (ADDERALL XR, 20MG,) 20 MG 24 hr capsule 30 capsule 0     Sig: Take 1 capsule (20 mg total) by mouth every morning Max Daily Amount: 20 mg       Refill request was sent to Dr. Duane Ion, my indirect supervisor, who will review and decide to approve/send to pharmacy. PDMP reviewed on 10/25/23. Omer Cagle has been appropriately adherent to their controlled psychotropic medication regimen in the absence of apparent abuse or misuse. Therefore, this writer will send a 30-day refill to their pharmacy of choice including recommendation for patient to adhere to their outpatient appointment(s) with this writer to assure appropriate continuity of care.       Yang Trevino MD

## 2023-10-29 NOTE — PROGRESS NOTES
Cardiology Office Note  MD Lauren Pope MD Enos Conroy, DO, Pam Griffon Mansoor, MD Dortha Severs, DO, Justine Luna DO, [facility]  ----------------------------------------------------------------  [address]  [address] [facility],  [address] 34 y.o. female MRN: [id number]  Unit/Bed#:  Encounter: [id number]      History of Present Illness: It was a pleasure to see Alice Shen in the office today for follow-up CV evaluation. She has no significant past medical history. She established care with us in January 2022. She has longstanding history near syncopal and syncopal episodes. These episodes have occurred since she was young. The symptoms begin with significant lightheadedness. She then breaks out in to nausea with diaphoresis, has tunnel vision and then tries to get to the ground in a position that makes her feel comfortable. Unfortunately, the symptoms have been coming on fairly quickly. Overall, she is usually able to get herself to the ground, but at times the symptoms come on so fast that is overwhelming. Most recently, she had an episode in November 2021 where she was seated and had significant lightheadedness and obtaining her head multiple times against the ground. She had not been standing for an extended period time. Each of these episodes she is able to regain consciousness or functionality within moments. She has had prior workup in this was thought to be related to vertigo. Due to her symptoms, she was sent for stress test, echocardiogram and event recorder. She is here today discuss the results. Since our last encounter, she has been looking for more symptoms related to the episodes. She notices that when she feels that her back is more sweaty, her symptoms will be coming on in the near future.   Additionally, she feels as though she has much more time to get into a safe position and almost entirely happens when she is in the standing position with more prominent prodrome. Patient attempted to stay well-hydrated, but continued to have some episodes of lightheadedness. Due to her symptoms, she was sent for stress test and echocardiogram as well as event recorder in October 2023. Stress test and echocardiogram have resulted, but event recorder is currently pending. We are here to discuss the results of the stress test and echocardiogram.  She admits to a sharp pounding sensation in the chest that lasts for several seconds and then resolves. She denies lower extremity swelling, orthopnea or paroxysmal nocturnal dyspnea. Admits to some lightheadedness unchanged from prior without syncope. Review of Systems:  Review of Systems   Constitutional: Negative for decreased appetite, fever, weight gain and weight loss. HENT:  Negative for congestion and sore throat. Eyes:  Negative for visual disturbance. Cardiovascular:  Positive for chest pain, near-syncope and palpitations. Negative for dyspnea on exertion, leg swelling and syncope. Respiratory:  Negative for cough and shortness of breath. Hematologic/Lymphatic: Negative for bleeding problem. Skin:  Negative for rash. Musculoskeletal:  Negative for myalgias and neck pain. Gastrointestinal:  Negative for abdominal pain and nausea. Neurological:  Positive for light-headedness. Negative for weakness. Psychiatric/Behavioral:  Negative for depression.         Past Medical History:   Diagnosis Date    Abdominal pain     Anxiety     Asthma     as young child    Bipolar disorder ([address])     Chronic pain disorder     right hip    Depression     Diarrhea     Endometrial disorder 12/2012    Endometriosis     Epigastric pain     Fatigue     History of COVID-19 1/3/2022    On 12/25/21    Irregular menses 9/11/2014    Morbid obesity ([address])     Nausea and vomiting     Yeast infection 8/23/2017       Past Surgical History:   Procedure Laterality Date    DIAGNOSTIC LAPAROSCOPY      DIAGNOSTIC LAPAROSCOPY      DILATION AND CURETTAGE OF UTERUS      MD COLONOSCOPY FLX DX W/COLLJ SPEC WHEN PFRMD N/A 8/4/2017    Procedure: EGD with bx AND COLONOSCOPY with bx;  Surgeon: Khadra Campos MD;  Location: [facility]; Service: Gastroenterology    MD LAPAROSCOPY W/RMVL ADNEXAL STRUCTURES Bilateral 5/4/2021    Procedure: LAP SALPINGECTOMY;  fulgeration of endometriosis; Surgeon: Liseth Serrano MD;  Location: [facility];  Service: Gynecology       Social History     Socioeconomic History    Marital status: Single     Spouse name: None    Number of children: None    Years of education: None    Highest education level: None   Occupational History    None   Tobacco Use    Smoking status: Every Day     Packs/day: 0.50     Types: Cigarettes    Smokeless tobacco: Never    Tobacco comments:     1/2 pack/ week   Vaping Use    Vaping Use: Never used   Substance and Sexual Activity    Alcohol use: Yes     Comment: 2-3 glass/day wine or liquor    Drug use: Yes     Types: Marijuana     Comment: smokes daily    Sexual activity: Not Currently     Partners: Male     Birth control/protection: I.U.D. Other Topics Concern    None   Social History Narrative    None     Social Determinants of Health     Financial Resource Strain: Low Risk  (7/3/2023)    Overall Financial Resource Strain (CARDIA)     Difficulty of Paying Living Expenses: Not hard at all   Food Insecurity: No Food Insecurity (7/3/2023)    Hunger Vital Sign     Worried About Running Out of Food in the Last Year: Never true     Ran Out of Food in the Last Year: Never true   Transportation Needs: No Transportation Needs (7/3/2023)    PRAPARE - Transportation     Lack of Transportation (Medical): No     Lack of Transportation (Non-Medical):  No   Physical Activity: Not on file   Stress: Not on file   Social Connections: Not on file   Intimate Partner Violence: Not on file   Housing Stability: Not on file       Family History   Problem Relation Age of Onset    No Known Problems Mother     No Known Problems Father        Allergies   Allergen Reactions    Shellfish-Derived Products - Food Allergy Anaphylaxis     Rash, shortness of breath, chest pain    hosp a few times for this    Adhesive [Medical Tape] Swelling    Chlorhexidine Itching     burning    Other Swelling     All fruits         Current Outpatient Medications:     amphetamine-dextroamphetamine (ADDERALL XR, 20MG,) 20 MG 24 hr capsule, Take 1 capsule (20 mg total) by mouth every morning Max Daily Amount: 20 mg, Disp: 30 capsule, Rfl: 0    aspirin-acetaminophen-caffeine (EXCEDRIN MIGRAINE) 250-250-65 MG per tablet, Take 1 tablet by mouth every 6 (six) hours as needed for headaches As needed. , Disp: , Rfl:     hydrOXYzine HCL (ATARAX) 25 mg tablet, Take 1 tablet (25 mg total) by mouth every 6 (six) hours as needed for anxiety, Disp: 120 tablet, Rfl: 0    ibuprofen (MOTRIN) 800 mg tablet, TOME REMBERTO TABLETA POR V A ORAL CADA SEIS HORAS CUANDO SEA NECESARIO PAIN, Disp: , Rfl:     lamoTRIgine (LaMICtal) 200 MG tablet, Take lamotrigine 200 mg (1 tablet) and a two 25 mg tablets (2 tablets) for a total of 250 mg daily, Disp: 60 tablet, Rfl: 1    lamoTRIgine (LaMICtal) 25 mg tablet, Take lamotrigine 200 mg (1 tablet) and a two 25 mg tablets (2 tablets) for a total of 250 mg daily, Disp: 120 tablet, Rfl: 1    meclizine (ANTIVERT) 25 mg tablet, Take 1 tablet (25 mg total) by mouth every 8 (eight) hours, Disp: 30 tablet, Rfl: 0    metoprolol succinate (TOPROL-XL) 25 mg 24 hr tablet, Take 1 tablet (25 mg total) by mouth daily, Disp: 90 tablet, Rfl: 3    Vitals:    10/30/23 1436   BP: 128/80   BP Location: Right arm   Patient Position: Sitting   Cuff Size: Adult   Pulse: 98   Weight: 82.4 kg (181 lb 9.6 oz)   Height: 5' 5" (1.651 m)         PHYSICAL EXAMINATION:  Gen: Awake, Alert, NAD   Head/eyes: AT/NC, pupils equal and round, Anicteric  ENT: mmm  Neck: Supple, No elevated JVP, trachea midline  Resp: CTA bilaterally no w/r/r  CV: RRR +S1, S2, No m/r/g  Abd: Soft, obese, NT/ND + BS  Ext: no LE edema bilaterally  Neuro: Follows commands, moves all extermities  Psych: Appropriate affect, pleasant mood, pleasant attitude, non-combative  Skin: warm; no rash, erythema or venous stasis changes on exposed skin    --------------------------------------------------------------------------------  TREADMILL STRESS  Exercise stress test is negative for myocardial ischemia, ET 8:10, 86% MPHR, 10.1 METs, March 2022  --------------------------------------------------------------------------------  NUCLEAR STRESS TEST: No results found for this or any previous visit.     No results found for this or any previous visit.      --------------------------------------------------------------------------------  CATH:  No results found for this or any previous visit.    --------------------------------------------------------------------------------  ECHO:   LVEF 57%, trace MR/TR/MA, December 2021  --------------------------------------------------------------------------------  HOLTER  Holter w/ SR avg HR 86 bpm, rare VPCs, rare APCs, December 2021  --------------------------------------------------------------------------------  CAROTIDS  No results found for this or any previous visit.     --------------------------------------------------------------------------------  ECGs:  Results for orders placed or performed in visit on 10/30/23   POCT ECG    Impression    Sinus rhythm w/ occasional PVCs 94 bpm, non-specific ST-T wave abnormalities, Qtc 480 ms        Lab Results   Component Value Date    WBC 7.24 10/03/2023    HGB 12.6 10/03/2023    HCT 38.7 10/03/2023    MCV 94 10/03/2023     10/03/2023      Lab Results   Component Value Date    SODIUM 136 10/03/2023    K 4.0 10/03/2023     10/03/2023    CO2 29 10/03/2023    BUN 8 10/03/2023    CREATININE 0.77 10/03/2023    GLUC 116 10/03/2023    CALCIUM 9.3 10/03/2023      Lab Results   Component Value Date    HGBA1C 5.2 07/28/2015      Lab Results   Component Value Date    CHOL 195 03/24/2014     Lab Results   Component Value Date    HDL 52 03/24/2014     Lab Results   Component Value Date    LDLCALC 124 (H) 03/24/2014     Lab Results   Component Value Date    TRIG 96 03/24/2014     No results found for: "CHOLHDL"   Lab Results   Component Value Date    INR 1.11 06/24/2021    INR 1.01 04/21/2018    PROTIME 14.1 06/24/2021    PROTIME 13.3 04/21/2018        1. Syncope and collapse    2. Palpitations  -     POCT ECG    3. PVC (premature ventricular contraction)  -     metoprolol succinate (TOPROL-XL) 25 mg 24 hr tablet; Take 1 tablet (25 mg total) by mouth daily        IMPRESSION:  Syncope likely vasovagal  Event recorder w/ SR avg HR 77 bpm, rare APCs, rare atrial triplets, rare VPCs, rare ventricular couplets, nonsustained PSVT (x1) 4 beats at 107 bpm, April 2022  Exercise stress test appears negative for myocardial ischemia, ET 10 min, 89% MPHR, 13.4 METs, October 2023  LVEF 80%, normal diastolic function, mild TR, October 2023  Palpitations w/ PVCs  Obesity - resolved    PLAN:  It was a pleasure to see Natasha Farrar in the office today for follow-up CV evaluation. Patient has had palpitations with episodes of sharp discomfort in the chest that are episodic seem most consistent with PVCs. She has no signs or symptoms of heart failure and clinically examines to be euvolemic. Blood pressure and heart rate are currently stable. The patient is tolerating her current medications that any reported adverse effects. She can perform greater than 4 METS on a daily basis without significant exertional symptoms. Stress test was found to be negative for myocardial ischemia with good overall functional capacity and echocardiogram demonstrated normal left ventricular systolic and diastolic function without significant valvular disease. Event recorder is currently pending.   Vasovagal episodes continue to happen episodically, but this is no longer her chief complaint. The episodes of palpitations seem to be much more impacting to her activities of daily living. Denies any syncopal events. ECG was performed demonstrating evidence of isolated PVCs. Based on her clinical presentation, I have the following recommendations:    Patient has evidence of isolated PVCs on ECG. Be seem to correlate with her symptoms of palpitations. Given these findings, recommend the initiation of Toprol-XL 25 mg daily. Risk and benefits of medication discussed. Would encourage her to speak with primary care or other medical team regarding her Adderall. It is possible that her symptoms have increased due to the increase in dose. This is unclear. Would have the patient speak to them regarding their opinion. Recommend heart healthy diet low in sodium and carbohydrate  Would encourage 30 minutes a day, 5 days a week of moderate intensity activity to build cardiovascular endurance. Recommend she stay well-hydrated drinking 2.5 L of water on a daily basis. Avoid dehydrating liquids including coffee, tea and alcohol. Should she have any worsening in her symptoms, especially in frequency or severity or change in quality, recommend seeking immediate medical attention/dial 911. As she has not had any significant loss of consciousness, blood pressure appears to be stable, would continue with increased fluid intake, salt liberalization and potentially compression stockings. If she develops syncope related to a vasovagal response, we may consider midodrine or fludrocortisone at our follow-up encounter. We will follow-up with her after testing to review the results. As always, please not hesitate to call with any questions. Portions of the record may have been created with voice recognition software. Occasional wrong word or "sound a like" substitutions may have occurred due to the inherent limitations of voice recognition software.  Read the chart carefully and recognize, using context, where substitutions have occurred.       Signed: Jennifer Marsh DO, [id number] [facility], [facility], [facility]

## 2023-10-30 ENCOUNTER — OFFICE VISIT (OUTPATIENT)
Dept: CARDIOLOGY CLINIC | Facility: CLINIC | Age: 33
End: 2023-10-30
Payer: MEDICARE

## 2023-10-30 VITALS
BODY MASS INDEX: 30.26 KG/M2 | SYSTOLIC BLOOD PRESSURE: 128 MMHG | WEIGHT: 181.6 LBS | HEART RATE: 98 BPM | DIASTOLIC BLOOD PRESSURE: 80 MMHG | HEIGHT: 65 IN

## 2023-10-30 DIAGNOSIS — I49.3 PVC (PREMATURE VENTRICULAR CONTRACTION): ICD-10-CM

## 2023-10-30 DIAGNOSIS — R55 SYNCOPE AND COLLAPSE: Primary | ICD-10-CM

## 2023-10-30 DIAGNOSIS — R00.2 PALPITATIONS: ICD-10-CM

## 2023-10-30 PROCEDURE — 93000 ELECTROCARDIOGRAM COMPLETE: CPT | Performed by: INTERNAL MEDICINE

## 2023-10-30 PROCEDURE — 99214 OFFICE O/P EST MOD 30 MIN: CPT | Performed by: INTERNAL MEDICINE

## 2023-10-30 RX ORDER — METOPROLOL SUCCINATE 25 MG/1
25 TABLET, EXTENDED RELEASE ORAL DAILY
Qty: 90 TABLET | Refills: 3 | Status: SHIPPED | OUTPATIENT
Start: 2023-10-30 | End: 2024-01-28

## 2023-10-31 ENCOUNTER — TELEPHONE (OUTPATIENT)
Dept: PSYCHIATRY | Facility: CLINIC | Age: 33
End: 2023-10-31

## 2023-10-31 DIAGNOSIS — F90.2 ADHD (ATTENTION DEFICIT HYPERACTIVITY DISORDER), COMBINED TYPE: ICD-10-CM

## 2023-10-31 RX ORDER — DEXTROAMPHETAMINE SACCHARATE, AMPHETAMINE ASPARTATE MONOHYDRATE, DEXTROAMPHETAMINE SULFATE AND AMPHETAMINE SULFATE 5; 5; 5; 5 MG/1; MG/1; MG/1; MG/1
20 CAPSULE, EXTENDED RELEASE ORAL EVERY MORNING
Qty: 30 CAPSULE | Refills: 0 | Status: SHIPPED | OUTPATIENT
Start: 2023-10-31 | End: 2023-11-30

## 2023-10-31 NOTE — PROGRESS NOTES
PDMP website reviewed. Cristopher Jimenez has been appropriately adherent to controlled psychotropic medications without evidence of abuse or misuse. As such, will send 30-day refill to pharmacy of choice and follow up as necessary.

## 2023-10-31 NOTE — TELEPHONE ENCOUNTER
Denise Miranda called and LM to inquire if her Adderall prescription refill was sent. Upon review of the chart, the e- scribe did not transmit properly and the prescription was labeled "no print"       Please re-send Adderall prescription.        Denise Miranda- [phone]

## 2023-11-01 ENCOUNTER — TELEPHONE (OUTPATIENT)
Dept: PSYCHIATRY | Facility: CLINIC | Age: 33
End: 2023-11-01

## 2023-11-01 NOTE — TELEPHONE ENCOUNTER
Completed PA for Adderall XR 20 mg via [employer]t and uploaded office notes. Info sent to [facility] for review.

## 2023-11-01 NOTE — TELEPHONE ENCOUNTER
initiated Adderall XR 20 mg PA via [employer]. Waiting for clinical question to be uploaded into [employer] from [facility] in order to complete and submit PA.

## 2023-11-06 NOTE — TELEPHONE ENCOUNTER
Received fax from [facility] approving Adderall XR 20 mg PA 11/2/23 to 11/2/24. Approval letter scanned into media. [facility] was informed of PA approval, was informed they do not have in stock, will have to order and they do not know when th    Left Elise and left VM with info above.

## 2023-11-07 ENCOUNTER — CLINICAL SUPPORT (OUTPATIENT)
Dept: CARDIOLOGY CLINIC | Facility: CLINIC | Age: 33
End: 2023-11-07
Payer: MEDICARE

## 2023-11-07 DIAGNOSIS — R00.2 PALPITATIONS: ICD-10-CM

## 2023-11-07 PROCEDURE — 93248 EXT ECG>7D<15D REV&INTERPJ: CPT | Performed by: INTERNAL MEDICINE

## 2023-11-19 NOTE — PROGRESS NOTES
Name: Alice Shen      : 1990      MRN: [id number]  Encounter Provider: GANGA Cheek  Encounter Date: 2023   Encounter department: [address] [facility]     1. Encounter for completion of form with patient  Assessment & Plan:  FMLA form completed for intermittent fmla. 2. Hair loss  Assessment & Plan:  - Discussed stress management techniques. Recommend biotin & collagen. Check Vit D.         BMI Counseling: Body mass index is 31.45 kg/m². The BMI is above normal. Nutrition recommendations include decreasing portion sizes, encouraging healthy choices of fruits and vegetables, decreasing fast food intake, consuming healthier snacks, limiting drinks that contain sugar, moderation in carbohydrate intake, increasing intake of lean protein, reducing intake of saturated and trans fat and reducing intake of cholesterol. Exercise recommendations include moderate physical activity 150 minutes/week. No pharmacotherapy was ordered. Rationale for BMI follow-up plan is due to patient being overweight or obese. Carmela Fitzpatrick is a 34 y.o. female  has a past medical history of Abdominal pain, Anxiety, Asthma, Bipolar disorder ([address]), Chronic pain disorder, Depression, Diarrhea, Endometrial disorder, Endometriosis, Epigastric pain, Fatigue, History of COVID-19, Irregular menses, Morbid obesity ([address]), Nausea and vomiting, and Yeast infection. has a past surgical history that includes Diagnostic laparoscopy; Diagnostic laparoscopy; pr colonoscopy flx dx w/collj spec when pfrmd (N/A, 2017); Dilation and curettage of uterus; and pr laparoscopy w/rmvl adnexal structures (Bilateral, 2021). She presents today requesting intermittent fmla in order to make it to doctor appointments. She sees cardiology for her pre-syncopal and syncopal episodes. She has had to make it to multiple appts to complete ordered testing. Cardiology is managing this and she they advised her to f/u with PCP to complete fmla form. She also feels as though she has been losing a lot of hair recently, over the last two months. She has blood work done two months ago which was unremarkable. She has tried different shampoos and taking biotin which is not helping. She has been experiencing increased stress lately. .    Review of Systems   Constitutional: Negative. HENT: Negative. Eyes: Negative. Respiratory: Negative. Gastrointestinal: Negative. Endocrine: Negative. Genitourinary: Negative. Musculoskeletal: Negative. Skin:         (+) hair loss   Allergic/Immunologic: Negative. Hematological: Negative. Current Outpatient Medications on File Prior to Visit   Medication Sig    amphetamine-dextroamphetamine (ADDERALL XR, 20MG,) 20 MG 24 hr capsule Take 1 capsule (20 mg total) by mouth every morning Max Daily Amount: 20 mg    aspirin-acetaminophen-caffeine (EXCEDRIN MIGRAINE) 250-250-65 MG per tablet Take 1 tablet by mouth every 6 (six) hours as needed for headaches As needed.     hydrOXYzine HCL (ATARAX) 25 mg tablet Take 1 tablet (25 mg total) by mouth every 6 (six) hours as needed for anxiety    ibuprofen (MOTRIN) 800 mg tablet TOME REMBERTO TABLETA POR V A ORAL CADA SEIS HORAS CUANDO SEA NECESARIO PAIN    lamoTRIgine (LaMICtal) 200 MG tablet Take lamotrigine 200 mg (1 tablet) and a two 25 mg tablets (2 tablets) for a total of 250 mg daily    lamoTRIgine (LaMICtal) 25 mg tablet Take lamotrigine 200 mg (1 tablet) and a two 25 mg tablets (2 tablets) for a total of 250 mg daily    meclizine (ANTIVERT) 25 mg tablet Take 1 tablet (25 mg total) by mouth every 8 (eight) hours    metoprolol succinate (TOPROL-XL) 25 mg 24 hr tablet Take 1 tablet (25 mg total) by mouth daily       Objective     /76 (BP Location: Left arm, Patient Position: Sitting, Cuff Size: Standard)   Pulse 76   Temp 97.8 °F (36.6 °C) (Temporal)   Resp 18 Ht 5' 5" (1.651 m)   Wt 85.7 kg (189 lb)   LMP  (LMP Unknown)   SpO2 98%   BMI 31.45 kg/m²     Physical Exam  Vitals and nursing note reviewed. Constitutional:       General: She is not in acute distress. Appearance: She is not ill-appearing, toxic-appearing or diaphoretic. HENT:      Head: Normocephalic and atraumatic. Right Ear: External ear normal.      Left Ear: External ear normal.      Nose: Nose normal.   Eyes:      Conjunctiva/sclera: Conjunctivae normal.   Cardiovascular:      Rate and Rhythm: Normal rate. Pulmonary:      Effort: Pulmonary effort is normal.   Musculoskeletal:         General: Normal range of motion. Cervical back: Normal range of motion and neck supple. Skin:     General: Skin is warm and dry. Neurological:      Mental Status: She is alert and oriented to person, place, and time. Mental status is at baseline. Psychiatric:         Mood and Affect: Mood normal.         Behavior: Behavior normal.         Thought Content:  Thought content normal.         Judgment: Judgment normal.     Yamilka Salvador

## 2023-11-20 ENCOUNTER — OFFICE VISIT (OUTPATIENT)
Dept: FAMILY MEDICINE CLINIC | Facility: CLINIC | Age: 33
End: 2023-11-20

## 2023-11-20 VITALS
TEMPERATURE: 97.8 F | WEIGHT: 189 LBS | RESPIRATION RATE: 18 BRPM | SYSTOLIC BLOOD PRESSURE: 132 MMHG | BODY MASS INDEX: 31.49 KG/M2 | HEART RATE: 76 BPM | DIASTOLIC BLOOD PRESSURE: 76 MMHG | OXYGEN SATURATION: 98 % | HEIGHT: 65 IN

## 2023-11-20 DIAGNOSIS — Z02.89 ENCOUNTER FOR COMPLETION OF FORM WITH PATIENT: Primary | ICD-10-CM

## 2023-11-20 DIAGNOSIS — L65.9 HAIR LOSS: ICD-10-CM

## 2023-11-20 PROCEDURE — 99213 OFFICE O/P EST LOW 20 MIN: CPT

## 2023-11-22 DIAGNOSIS — B37.31 VAGINAL YEAST INFECTION: Primary | ICD-10-CM

## 2023-11-22 RX ORDER — FLUCONAZOLE 150 MG/1
150 TABLET ORAL ONCE
Qty: 2 TABLET | Refills: 0 | Status: SHIPPED | OUTPATIENT
Start: 2023-11-22 | End: 2023-11-22

## 2023-11-22 NOTE — TELEPHONE ENCOUNTER
Patient called with prescription for fluconazole, sent. She is due for yearly exam December 2023, please arrange for this.

## 2023-11-22 NOTE — TELEPHONE ENCOUNTER
Patient called with a yeast infection and is requesting diflucan. She says she has had many and is certain.

## 2023-12-06 ENCOUNTER — TELEPHONE (OUTPATIENT)
Dept: PSYCHIATRY | Facility: CLINIC | Age: 33
End: 2023-12-06

## 2023-12-06 DIAGNOSIS — F90.2 ADHD (ATTENTION DEFICIT HYPERACTIVITY DISORDER), COMBINED TYPE: ICD-10-CM

## 2023-12-06 RX ORDER — DEXTROAMPHETAMINE SACCHARATE, AMPHETAMINE ASPARTATE MONOHYDRATE, DEXTROAMPHETAMINE SULFATE AND AMPHETAMINE SULFATE 5; 5; 5; 5 MG/1; MG/1; MG/1; MG/1
20 CAPSULE, EXTENDED RELEASE ORAL EVERY MORNING
Qty: 30 CAPSULE | Refills: 0 | Status: SHIPPED | OUTPATIENT
Start: 2023-12-06 | End: 2024-01-05

## 2023-12-06 NOTE — TELEPHONE ENCOUNTER
PDMP website reviewed. Luz Maria Woodruff has been appropriately adherent to controlled psychotropic medications without evidence of abuse or misuse. As such, will send 30-day refill to pharmacy of choice and follow up as necessary.

## 2023-12-15 ENCOUNTER — TELEMEDICINE (OUTPATIENT)
Dept: PSYCHIATRY | Facility: CLINIC | Age: 33
End: 2023-12-15

## 2023-12-15 DIAGNOSIS — F90.2 ADHD (ATTENTION DEFICIT HYPERACTIVITY DISORDER), COMBINED TYPE: ICD-10-CM

## 2023-12-15 DIAGNOSIS — F31.81 BIPOLAR 2 DISORDER, MAJOR DEPRESSIVE EPISODE (HCC): ICD-10-CM

## 2023-12-15 DIAGNOSIS — F90.2 ADHD (ATTENTION DEFICIT HYPERACTIVITY DISORDER), COMBINED TYPE: Primary | ICD-10-CM

## 2023-12-15 DIAGNOSIS — F41.1 GENERALIZED ANXIETY DISORDER: ICD-10-CM

## 2023-12-15 PROCEDURE — NC001 PR NO CHARGE: Performed by: STUDENT IN AN ORGANIZED HEALTH CARE EDUCATION/TRAINING PROGRAM

## 2023-12-15 RX ORDER — DEXTROAMPHETAMINE SACCHARATE, AMPHETAMINE ASPARTATE MONOHYDRATE, DEXTROAMPHETAMINE SULFATE AND AMPHETAMINE SULFATE 6.25; 6.25; 6.25; 6.25 MG/1; MG/1; MG/1; MG/1
25 CAPSULE, EXTENDED RELEASE ORAL EVERY MORNING
Qty: 30 CAPSULE | Refills: 0 | Status: SHIPPED | OUTPATIENT
Start: 2023-12-15 | End: 2024-01-14

## 2023-12-15 RX ORDER — DEXTROAMPHETAMINE SACCHARATE, AMPHETAMINE ASPARTATE MONOHYDRATE, DEXTROAMPHETAMINE SULFATE AND AMPHETAMINE SULFATE 6.25; 6.25; 6.25; 6.25 MG/1; MG/1; MG/1; MG/1
25 CAPSULE, EXTENDED RELEASE ORAL EVERY MORNING
Start: 2023-12-15 | End: 2023-12-15 | Stop reason: SDUPTHER

## 2023-12-15 NOTE — PATIENT INSTRUCTIONS
Please call the office nursing staff for medication issues including refills, problems getting medications, bothersome side effects, etc., at [phone]. Please return for a follow up appointment as discussed and arrive approximately 15 minutes prior to your appointment time. If you are running late or are unable to attend your appointment, please call our [facility] office at [phone] (fax: [phone]), or if you were seen in the [address] [place] [address] office, please call [phone] (fax [phone]). Recommendations regarding insomnia:  Wake-up at the same time every day  Refrain from "napping". Refrain from going to bed unless you're tired  Utilize your bedroom for sleep only. Avoid use of electronics including television and/or cellphone/computers. Refrain from use of electronics including television and/or cellphones/computers prior to bed  Turn your alarm clock away so the light is not visible. Attempt relaxation using various means like reading if you're restless in bed for approximately 15-20 minutes. Participate in regular physical activities like exercise, although avoid approximately 3-4 hours prior to bed. Morning exercise is ideal.  Avoid caffeine use prior to bedtime. Consider tapering down excessive use of caffeine. Avoid tobacco use prior to bedtime. Avoid alcohol use prior to bedtime. Consider reading "No More Sleepless nights" by Elsi Jon, Ph.D.  Consider use of online resources including:  http://[employer]/cbt-online-insomnia-treatment.html  GiveForward. com  CBT-I  Lisa on your Smart Phone. Go! To Sleep by the [facility]. Look up "grounding techniques" and/or "anchoring demonstration" online and try a few to see what may work for you. Practice these skills before you need them, when you are not feeling too anxious or triggered.  You can also search for free guided meditation videos online to help improve your head space when you are feeling very anxious or triggered. Healthy Diet   The American Heart Association and the [employer] of Cardiology have long recommended a healthy diet for not only patients who are at risk for atherosclerotic cardiovascular disease (ASCVD) but also the general public. In keeping with this evidence-based recommendation, the "2018 Guideline on the Management of Blood Cholesterol" stresses that a healthy diet should include adequate intake of these essentials:   Vegetables, fruits, and whole grains   Legumes and nuts   Low-fat dairy products   Low-fat poultry (without the skin)   Fish and seafood   Nontropical vegetable oils     The recent guidelines do provide room for cultural food preferences in a healthy diet, but in general, all patients should limit their intake of saturated and avoid all trans fats, sweets, sugar-sweetened beverages, and red meats. Please maintain adequate hydration of at least 2 Liters of water per day, and improve nutrition by decreasing portion sizes, avoiding fried foods, fast foods, inappropriate snacking and overly processed and packaged items with 'added sugars' (whether in drinks or foods), and observing nutritional facts information on any items that are packaged to reduce intake of saturated fats and AVOID trans fats as mentioned above. Again, consume whole foods such as vegetables, higher lean protein intake, and using healthier lifestyle plans such as the Mediterranean diet with healthier fats such as those from seeds, nuts, and using organic extra virgin olive oil or avocado oil in lieu of processed vegetable oils or margarine. Physical Activity   Recommended DAILY exercise for at least 150 minutes of moderate exercise weekly! In addition to a healthy diet, all patients should include regular physical activity in their weekly routines, at moderate to vigorous intensity.  Any activity is better than nothing, so if your patients can't meet the recommendation of vigorous activity, moderate-intensity activity can still help them reduce their risk of ASCVD. Below are the American Heart Association's recommendations for physical activity per week (preferably spread throughout the week): For Overall Cardiovascular Health and Lowering Cholesterol   At least 150 minutes of moderate-intensity physical activity (for example, 30 minutes, 5 days a week), or   At least 75 minutes of vigorous-intensity physical activity (for example, 25 minutes, 3 days a week); or   A combination of moderate- and vigorous-intensity aerobic activity, and   At least 2 days of moderate- to high-intensity muscle-strengthening activities (such as resistance weight training) for additional health benefits    If you have thoughts of harming yourself or are otherwise in psychological crisis, do not hesitate to contact your [address] [facility], or 911 or go to the nearest emergency room. Adult Crisis Numbers  Suicide Prevention Hotline - Dial 9-8-8  [facility]: [phone] or [address]: [facility] 98: [address] [facility] [address]: [phone]  [address] [place] [address] Street: [phone] or [phone]  [facility]: [address] [place] [address] Sw: [phone] or [facility] Crisis: [address] [facility] [address] [facility] [address]: [phone] (daytime). [phone] (after hours, weekends, holidays)     Child/Adolescent Crisis Numbers   [facility]: [address]: [address] [place], [place]: [phone]   [address] [place] [address]: [phone]  Please note: Some [facility] do not have a separate number for Child/Adolescent specific crisis. If your county is not listed under Child/Adolescent, please call the adult number for your county     National Talk to Text Line   All Ages - [facility] [address]: [phone] or call 178.

## 2023-12-15 NOTE — BH TREATMENT PLAN
TREATMENT PLAN        [address]    Name and Date of Birth:  Carlo Belcher 34 y.o. 1990  Date of Treatment Plan: December 15, 2023  Diagnosis/Diagnoses:    1. Bipolar 2 disorder, major depressive episode ([address])    2. Generalized anxiety disorder    3.  ADHD (attention deficit hyperactivity disorder), combined type        Strengths/Personal Resources for Self-Care: supportive family, supportive friends, taking medications as prescribed, ability to communicate needs, ability to communicate well, ability to listen, ability to reason, financial means, independence, ability to negotiate basic needs, being resoureceful, stable employment, work skills    Area/Areas of need: anxiety, anxiety symptoms, depression, depressive symptoms, mood instability, mood swings, sleep problems, attention and concentration problems, ADHD symptoms, completing school work on time, completing tasks at work, few hobbies or interests, financial problems, stress at work, time management, unstable mood    Long Term Goal: maintain control of mood stability  Target Date: 6 months - Rosanna 15, 2024  Person/Persons responsible for completion of goal: Vickey Serrano MD     Short Term Objective (s) - How will we reach this goal?:   Take medications as prescribed  Attend psychiatry appointments regularly  Get blood work drawn  Practice coping skills  Try sleep hygiene techniques  Avoid alcohol   Avoid drugs   Eat a healthy diet   Take walks regularly  Try breathing exercises  Try relaxation techniques  Target Date: 6 months - Rosanna 15, 2024  Person/Persons Responsible for Completion of Goal: Meri Yarbrough     Progress Towards Goals: Continuing treatment    Treatment Modality: medication management every 1-3 months as needed and follow up with PCP  Review due 180 days from date of this plan: June 12, 2024   Expected length of service: Ongoing treatment    My physician and I have developed this plan together, and I agree to work on the goals and objectives. I understand the treatment goals that were developed for my treatment. The treatment plan was created between Chau Sargent MD and Jere Paz on 12/15/23 at 10:15 AM but not signed at the time of the visit due to [address] The plan was reviewed, and verbal consent was given.
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
## 2024-01-08 ENCOUNTER — OFFICE VISIT (OUTPATIENT)
Dept: CARDIOLOGY CLINIC | Facility: CLINIC | Age: 34
End: 2024-01-08
Payer: MEDICARE

## 2024-01-08 VITALS
BODY MASS INDEX: 32.42 KG/M2 | HEART RATE: 50 BPM | WEIGHT: 194.8 LBS | DIASTOLIC BLOOD PRESSURE: 76 MMHG | SYSTOLIC BLOOD PRESSURE: 105 MMHG

## 2024-01-08 DIAGNOSIS — R00.2 PALPITATIONS: ICD-10-CM

## 2024-01-08 DIAGNOSIS — E66.9 OBESITY (BMI 30-39.9): ICD-10-CM

## 2024-01-08 DIAGNOSIS — I49.3 PVC (PREMATURE VENTRICULAR CONTRACTION): ICD-10-CM

## 2024-01-08 DIAGNOSIS — R55 SYNCOPE AND COLLAPSE: Primary | ICD-10-CM

## 2024-01-08 PROCEDURE — 99214 OFFICE O/P EST MOD 30 MIN: CPT | Performed by: INTERNAL MEDICINE

## 2024-01-08 NOTE — PROGRESS NOTES
Cardiology Office Note  MD Karan Baird MD Jason Kaplan, DO, [facility]  MD Jeffrey Hayes DO, [facility]  Duane Beltran DO, [facility]  [place]  [place] [address]    Elise Iyer 33 y.o. female MRN: [id number]  Unit/Bed#:  Encounter: [id number]      History of Present Illness:  It was a pleasure to see Elise Iyer in the office today for follow-up CV evaluation.  She has no significant past medical history.  She established care with us in January 2022. She has longstanding history near syncopal and syncopal episodes.  These episodes have occurred since she was young.  The symptoms begin with significant lightheadedness.  She then breaks out in to nausea with diaphoresis, has tunnel vision and then tries to get to the ground in a position that makes her feel comfortable.  Unfortunately, the symptoms have been coming on fairly quickly.  Overall, she is usually able to get herself to the ground, but at times the symptoms come on so fast that is overwhelming.  Most recently, she had an episode in November 2021 where she was seated and had significant lightheadedness and obtaining her head multiple times against the ground.  She had not been standing for an extended period time.  Each of these episodes she is able to regain consciousness or functionality within moments.  She has had prior workup in this was thought to be related to vertigo.  Due to her symptoms, she was sent for stress test, echocardiogram and event recorder.  She is here today discuss the results.  Since our last encounter, she has been looking for more symptoms related to the episodes.  She notices that when she feels that her back is more sweaty, her symptoms will be coming on in the near future.  Additionally, she feels as though she has much more time to get into a safe position and almost entirely happens when  she is in the standing position with more prominent prodrome.  Patient attempted to stay well-hydrated, but continued to have some episodes of lightheadedness.  Due to her symptoms, she was sent for stress test and echocardiogram as well as event recorder in October 2023.  Stress test was found to be negative for myocardial ischemia with good overall functional capacity.  Echocardiogram demonstrated normal left ventricular systolic function with mild valvular regurgitation of the tricuspid valve.  Event recorder demonstrated sinus rhythm with occasional ventricular and rare atrial ectopy.  She did 2% burden of PVCs which correlated frequently with her symptoms.  She is here today to review the results.  She admits to a sharp pounding sensation in the chest that lasts for several seconds and then resolves.  She denies lower extremity swelling, orthopnea or paroxysmal nocturnal dyspnea.  Admits to some lightheadedness unchanged from prior without syncope.    Review of Systems:  Review of Systems   Constitutional: Negative for decreased appetite, fever, weight gain and weight loss.   HENT:  Negative for congestion and sore throat.    Eyes:  Negative for visual disturbance.   Cardiovascular:  Positive for chest pain, near-syncope and palpitations. Negative for dyspnea on exertion, leg swelling and syncope.   Respiratory:  Negative for cough and shortness of breath.    Hematologic/Lymphatic: Negative for bleeding problem.   Skin:  Negative for rash.   Musculoskeletal:  Negative for myalgias and neck pain.   Gastrointestinal:  Negative for abdominal pain and nausea.   Neurological:  Positive for light-headedness. Negative for weakness.   Psychiatric/Behavioral:  Negative for depression.        Past Medical History:   Diagnosis Date    Abdominal pain     Anxiety     Asthma     as young child    Bipolar disorder (HCC)     Chronic pain disorder     right hip    Depression     Diarrhea     Endometrial disorder 12/2012     Endometriosis     Epigastric pain     Fatigue     History of COVID-19 1/3/2022    On 12/25/21    Irregular menses 9/11/2014    Morbid obesity (HCC)     Nausea and vomiting     Yeast infection 8/23/2017       Past Surgical History:   Procedure Laterality Date    DIAGNOSTIC LAPAROSCOPY      DIAGNOSTIC LAPAROSCOPY      DILATION AND CURETTAGE OF UTERUS      FL COLONOSCOPY FLX DX W/COLLJ SPEC WHEN PFRMD N/A 8/4/2017    Procedure: EGD with bx AND COLONOSCOPY with bx;  Surgeon: Angel Garcia MD;  Location: [facility];  Service: Gastroenterology    FL LAPAROSCOPY W/RMVL ADNEXAL STRUCTURES Bilateral 5/4/2021    Procedure: LAP SALPINGECTOMY;  fulgeration of endometriosis;  Surgeon: Jae Goodman MD;  Location: [facility];  Service: Gynecology       Social History     Socioeconomic History    Marital status: Single     Spouse name: None    Number of children: None    Years of education: None    Highest education level: None   Occupational History    None   Tobacco Use    Smoking status: Every Day     Current packs/day: 0.50     Types: Cigarettes     Passive exposure: Current    Smokeless tobacco: Never    Tobacco comments:     1/2 pack/ week   Vaping Use    Vaping status: Never Used   Substance and Sexual Activity    Alcohol use: Yes     Comment: 2-3 glass/day wine or liquor    Drug use: Yes     Types: Marijuana     Comment: smokes daily    Sexual activity: Not Currently     Partners: Male     Birth control/protection: I.U.D.   Other Topics Concern    None   Social History Narrative    None     Social Determinants of Health     Financial Resource Strain: Low Risk  (7/3/2023)    Overall Financial Resource Strain (CARDIA)     Difficulty of Paying Living Expenses: Not hard at all   Food Insecurity: No Food Insecurity (7/3/2023)    Hunger Vital Sign     Worried About Running Out of Food in the Last Year: Never true     Ran Out of Food in the Last Year: Never true   Transportation Needs: No Transportation Needs (7/3/2023)    PRAPARE -  Transportation     Lack of Transportation (Medical): No     Lack of Transportation (Non-Medical): No   Physical Activity: Not on file   Stress: Not on file   Social Connections: Not on file   Intimate Partner Violence: Not on file   Housing Stability: Not on file       Family History   Problem Relation Age of Onset    No Known Problems Mother     No Known Problems Father        Allergies   Allergen Reactions    Shellfish-Derived Products - Food Allergy Anaphylaxis     Rash, shortness of breath, chest pain    hosp a few times for this    Adhesive [Medical Tape] Swelling    Chlorhexidine Itching     burning    Other Swelling     All fruits         Current Outpatient Medications:     amphetamine-dextroamphetamine (ADDERALL XR, 25MG,) 25 MG 24 hr capsule, Take 1 capsule (25 mg total) by mouth every morning Max Daily Amount: 25 mg, Disp: 30 capsule, Rfl: 0    aspirin-acetaminophen-caffeine (EXCEDRIN MIGRAINE) 250-250-65 MG per tablet, Take 1 tablet by mouth every 6 (six) hours as needed for headaches As needed., Disp: , Rfl:     hydrOXYzine HCL (ATARAX) 25 mg tablet, Take 1 tablet (25 mg total) by mouth every 6 (six) hours as needed for anxiety, Disp: 120 tablet, Rfl: 0    ibuprofen (MOTRIN) 800 mg tablet, TOME REMBERTO TABLETA POR V A ORAL CADA SEIS HORAS CUANDO SEA NECESARIO PAIN, Disp: , Rfl:     lamoTRIgine (LaMICtal) 200 MG tablet, Take lamotrigine 200 mg (1 tablet) and a two 25 mg tablets (2 tablets) for a total of 250 mg daily, Disp: 60 tablet, Rfl: 1    lamoTRIgine (LaMICtal) 25 mg tablet, Take lamotrigine 200 mg (1 tablet) and a two 25 mg tablets (2 tablets) for a total of 250 mg daily, Disp: 120 tablet, Rfl: 1    meclizine (ANTIVERT) 25 mg tablet, Take 1 tablet (25 mg total) by mouth every 8 (eight) hours, Disp: 30 tablet, Rfl: 0    metoprolol succinate (TOPROL-XL) 25 mg 24 hr tablet, Take 1 tablet (25 mg total) by mouth daily, Disp: 90 tablet, Rfl: 3    Vitals:    01/08/24 1534   BP: 105/76   BP Location: Right arm    Patient Position: Sitting   Cuff Size: Large   Pulse: (!) 50   Weight: 88.4 kg (194 lb 12.8 oz)           PHYSICAL EXAMINATION:  Gen: Awake, Alert, NAD   Head/eyes: AT/NC, pupils equal and round, Anicteric  ENT: mmm  Neck: Supple, No elevated JVP, trachea midline  Resp: CTA bilaterally no w/r/r  CV: RRR +S1, S2, No m/r/g  Abd: Soft, obese, NT/ND + BS  Ext: no LE edema bilaterally  Neuro: Follows commands, moves all extermities  Psych: Appropriate affect, normal mood, cooperative attitude, non-combative  Skin: warm; no rash, erythema or venous stasis changes on exposed skin    --------------------------------------------------------------------------------  TREADMILL STRESS  Exercise stress test is negative for myocardial ischemia, ET 8:10, 86% MPHR, 10.1 METs, March 2022  --------------------------------------------------------------------------------  NUCLEAR STRESS TEST: No results found for this or any previous visit.    No results found for this or any previous visit.      --------------------------------------------------------------------------------  CATH:  No results found for this or any previous visit.    --------------------------------------------------------------------------------  ECHO:   LVEF 57%, trace MR/TR/UT, December 2021  --------------------------------------------------------------------------------  HOLTER  Holter w/ SR avg HR 86 bpm, rare VPCs, rare APCs, December 2021  Event recorder w/ SR avg HR 77 bpm, rare APCs, rare atrial triplets, rare VPCs, rare ventricular couplets, nonsustained PSVT (x1) 4 beats at 107 bpm, April 2022  --------------------------------------------------------------------------------  CAROTIDS  No results found for this or any previous visit.     --------------------------------------------------------------------------------  ECGs:  No results found for this visit on 01/08/24.       Lab Results   Component Value Date    WBC 7.24 10/03/2023    HGB 12.6 10/03/2023  "   HCT 38.7 10/03/2023    MCV 94 10/03/2023     10/03/2023      Lab Results   Component Value Date    SODIUM 136 10/03/2023    K 4.0 10/03/2023     10/03/2023    CO2 29 10/03/2023    BUN 8 10/03/2023    CREATININE 0.77 10/03/2023    GLUC 116 10/03/2023    CALCIUM 9.3 10/03/2023      Lab Results   Component Value Date    HGBA1C 5.2 07/28/2015      Lab Results   Component Value Date    CHOL 195 03/24/2014     Lab Results   Component Value Date    HDL 52 03/24/2014     Lab Results   Component Value Date    LDLCALC 124 (H) 03/24/2014     Lab Results   Component Value Date    TRIG 96 03/24/2014     No results found for: \"CHOLHDL\"   Lab Results   Component Value Date    INR 1.11 06/24/2021    INR 1.01 04/21/2018    PROTIME 14.1 06/24/2021    PROTIME 13.3 04/21/2018        1. Syncope and collapse    2. Palpitations    3. PVC (premature ventricular contraction)    4. Obesity (BMI 30-39.9)          IMPRESSION:  Syncope likely vasovagal  Exercise stress test appears negative for myocardial ischemia, ET 10 min, 89% MPHR, 13.4 METs, October 2023  LVEF 55%, normal diastolic function, mild TR, October 2023  Palpitations w/ PVCs  Event recorder w/ SR avg HR 87 bpm, rare PACs, rare atrial couplets, occasional PVCs 2.0% burden, rare ventricular couplets, nonsustained VT x 1 for 4 beats at 120 bpm, nonsustained PSVT x 1 w/ 6 beats at 154 bpm, triggered events frequently correlated with isolated PVCs, November 2023  Obesity - resolved    PLAN:  It was a pleasure to see Elise in the office today for follow-up CV evaluation.  She is here today for follow-up regarding her event recorder.  Event recorder showed sinus rhythm with occasional burden of PVCs.  Her burden was 2%.  PVCs heavily correlated with her triggered events.  I have shared with her this news.  After being on the event recorder, she was started on Toprol-XL which substantially decreased the severity of her symptoms of palpitations.  She has admitted that " "the palpitations somewhat increased in intensity in the evening, but otherwise are doing much better.  She has no symptoms concerning for angina and no signs or symptoms of heart failure.  Clinically she examines to be euvolemic.  Blood pressure and heart rate are currently stable.  She is tolerating her current medications without any reported adverse effects.  Based on her clinical presentation, I have the following recommendations:    1.  Recommend avoiding excess coffee or tea.  Would completely avoid alcohol to reduce her PVC risk.  2.  Continue Toprol-XL 25 mg daily.  Would not uptitrate further as her heart rates at rest are in the low 50s.  3.  Would encourage heart healthy diet low in sodium and carbohydrate.  4.  Recommend 30 minutes a day, 5 days a week of moderate intensity activity to build cardiovascular endurance.  5.  Recommend she stay well-hydrated drinking 2.5 L of water on a daily basis.  6.  Should her symptoms worsen in frequency or severity or change in quality and especially with any loss of consciousness, recommend seeking immediate medical attention/dial 911.  7.  With increased hydration and splitting the Toprol-XL into 2 doses, we will reevaluate her symptoms in 3 months and discuss if antiarrhythmic drug is needed at that time based on her progress.    As always, please not hesitate to call with any questions.    Portions of the record may have been created with voice recognition software. Occasional wrong word or \"sound a like\" substitutions may have occurred due to the inherent limitations of voice recognition software. Read the chart carefully and recognize, using context, where substitutions have occurred.      Signed: Phuc Mcghee DO, FACC, FASE, FACP  "

## 2024-01-17 ENCOUNTER — TELEPHONE (OUTPATIENT)
Dept: PSYCHIATRY | Facility: CLINIC | Age: 34
End: 2024-01-17

## 2024-01-17 NOTE — TELEPHONE ENCOUNTER
Elise left a VM that she needs an official letter for an Emotional support animal, as soon as possible. She tried another number, but didn't reach anyone.     She requested a call [phone]

## 2024-01-22 ENCOUNTER — OFFICE VISIT (OUTPATIENT)
Dept: GYNECOLOGY | Facility: CLINIC | Age: 34
End: 2024-01-22
Payer: MEDICARE

## 2024-01-22 ENCOUNTER — TELEPHONE (OUTPATIENT)
Dept: GYNECOLOGY | Facility: CLINIC | Age: 34
End: 2024-01-22

## 2024-01-22 VITALS
BODY MASS INDEX: 31.65 KG/M2 | SYSTOLIC BLOOD PRESSURE: 120 MMHG | HEIGHT: 65 IN | DIASTOLIC BLOOD PRESSURE: 84 MMHG | WEIGHT: 190 LBS

## 2024-01-22 DIAGNOSIS — N89.8 VAGINAL DISCHARGE: ICD-10-CM

## 2024-01-22 DIAGNOSIS — N80.9 ENDOMETRIOSIS DETERMINED BY LAPAROSCOPY: ICD-10-CM

## 2024-01-22 DIAGNOSIS — R10.2 PELVIC PAIN: ICD-10-CM

## 2024-01-22 DIAGNOSIS — N94.10 DYSPAREUNIA, FEMALE: ICD-10-CM

## 2024-01-22 DIAGNOSIS — Z11.3 SCREENING EXAMINATION FOR STD (SEXUALLY TRANSMITTED DISEASE): ICD-10-CM

## 2024-01-22 DIAGNOSIS — N92.0 MENORRHAGIA WITH REGULAR CYCLE: ICD-10-CM

## 2024-01-22 DIAGNOSIS — Z90.79 STATUS POST BILATERAL SALPINGECTOMY: ICD-10-CM

## 2024-01-22 DIAGNOSIS — N76.0 BV (BACTERIAL VAGINOSIS): ICD-10-CM

## 2024-01-22 DIAGNOSIS — N93.0 POSTCOITAL BLEEDING: ICD-10-CM

## 2024-01-22 DIAGNOSIS — B96.89 BV (BACTERIAL VAGINOSIS): ICD-10-CM

## 2024-01-22 PROCEDURE — 99214 OFFICE O/P EST MOD 30 MIN: CPT | Performed by: OBSTETRICS & GYNECOLOGY

## 2024-01-22 PROCEDURE — 87491 CHLMYD TRACH DNA AMP PROBE: CPT | Performed by: OBSTETRICS & GYNECOLOGY

## 2024-01-22 PROCEDURE — 87591 N.GONORRHOEAE DNA AMP PROB: CPT | Performed by: OBSTETRICS & GYNECOLOGY

## 2024-01-22 RX ORDER — CLINDAMYCIN HYDROCHLORIDE 300 MG/1
300 CAPSULE ORAL 2 TIMES DAILY
Qty: 14 CAPSULE | Refills: 0 | Status: SHIPPED | OUTPATIENT
Start: 2024-01-22 | End: 2024-01-29

## 2024-01-22 NOTE — TELEPHONE ENCOUNTER
Elise left an additional message regarding the message she left last week for a letter for the [employer].e is moving very soon and needs the letter asap.

## 2024-01-22 NOTE — PROGRESS NOTES
Assessment/Plan:    Diagnoses and all orders for this visit:    Vaginal discharge  -     Chlamydia/GC amplified DNA by PCR    Screening examination for STD (sexually transmitted disease)  -     Chlamydia/GC amplified DNA by PCR  -     RPR-Syphilis Screening (Total Syphilis IGG/IGM); Future  -     HIV-1 RNA, quantitative, PCR; Future  -     Hepatitis B surface antigen; Future  -     Hepatitis C antibody; Future    Pelvic pain  -     Chlamydia/GC amplified DNA by PCR  -     US pelvis complete non OB; Future  -     hCG, quantitative; Future    Menorrhagia with regular cycle  -     CBC and Platelet; Future  -     hCG, quantitative; Future    Status post bilateral salpingectomy    Postcoital bleeding  -     US pelvis complete non OB; Future  -     CBC and Platelet; Future    Dyspareunia, female  -     US pelvis complete non OB; Future    Endometriosis determined by laparoscopy  -     US pelvis complete non OB; Future    BV (bacterial vaginosis)  -     clindamycin (CLEOCIN) 300 MG capsule; Take 1 capsule (300 mg total) by mouth 2 (two) times a day for 7 days     IUD in place    Subjective: Pelvic pain, dyspareunia, postcoital bleeding     Patient ID: Elise Iyer is a 33 y.o. female.    Vaginal Discharge  The patient's primary symptoms include vaginal discharge.   33-year-old female  3 para 3 history of progressive pelvic pain symptoms menorrhagia with irregular cycle.  Complains of dyspareunia in all positions, occasionally has to stop or avoid intercourse altogether.  Also complains of postcoital bleeding, every time she has intercourse.  She had a bilateral salpingectomy several years ago.  IUD was left in place to help stabilize her bleeding symptoms.  However she continues to bleed almost on a daily basis.  Vaginal discharge with odor provide exam reveals bacterial vaginosis.  Patient states she is moving out of the state in March.  Screening laboratory studies ordered as well as a pelvic ultrasound  "follow-up in the upcoming weeks for follow-up services and plan of care.    Review of Systems   Genitourinary:  Positive for vaginal discharge.  unchanged    Objective: No acute distress  /84 (BP Location: Right arm, Patient Position: Sitting, Cuff Size: Standard)   Ht 5' 5\" (1.651 m)   Wt 86.2 kg (190 lb)   BMI 31.62 kg/m²      Physical Exam  Vitals and nursing note reviewed. Exam conducted with a chaperone present.   Constitutional:       Appearance: Normal appearance. She is obese.   HENT:      Head: Normocephalic.      Nose: Nose normal.   Eyes:      Extraocular Movements: Extraocular movements intact.      Pupils: Pupils are equal, round, and reactive to light.   Pulmonary:      Effort: Pulmonary effort is normal.   Abdominal:      General: Abdomen is flat.      Palpations: Abdomen is soft.   Genitourinary:     General: Normal vulva.      Comments: Normal external genitalia  Normal size uterus  Normal cervix IUD strings clearly visible from external os  No CMT  No pelvic masses  Yellow vaginal discharge, wet mount positive for clue cells consistent with bacterial vaginosis  Musculoskeletal:         General: Normal range of motion.      Cervical back: Normal range of motion.   Skin:     General: Skin is warm and dry.   Neurological:      Mental Status: She is alert and oriented to person, place, and time.   Psychiatric:         Mood and Affect: Mood normal.         Behavior: Behavior normal.         Thought Content: Thought content normal.         Judgment: Judgment normal.        Please note    In addition to the time spent discussing the findings and results of today's visit and exam, I spent approximately 30 minutes of face-to-face time with the patient, greater than 50% of which was spent in counseling and coordination of care for this patient.  "

## 2024-01-23 ENCOUNTER — APPOINTMENT (OUTPATIENT)
Dept: LAB | Facility: HOSPITAL | Age: 34
End: 2024-01-23
Payer: MEDICARE

## 2024-01-23 DIAGNOSIS — R10.2 PELVIC PAIN: ICD-10-CM

## 2024-01-23 DIAGNOSIS — N92.0 MENORRHAGIA WITH REGULAR CYCLE: ICD-10-CM

## 2024-01-23 DIAGNOSIS — N93.0 POSTCOITAL BLEEDING: ICD-10-CM

## 2024-01-23 DIAGNOSIS — Z13.29 SCREENING FOR ENDOCRINE, NUTRITIONAL, METABOLIC AND IMMUNITY DISORDER: ICD-10-CM

## 2024-01-23 DIAGNOSIS — Z13.228 SCREENING FOR ENDOCRINE, NUTRITIONAL, METABOLIC AND IMMUNITY DISORDER: ICD-10-CM

## 2024-01-23 DIAGNOSIS — Z13.21 SCREENING FOR ENDOCRINE, NUTRITIONAL, METABOLIC AND IMMUNITY DISORDER: ICD-10-CM

## 2024-01-23 DIAGNOSIS — Z11.3 SCREENING EXAMINATION FOR STD (SEXUALLY TRANSMITTED DISEASE): ICD-10-CM

## 2024-01-23 DIAGNOSIS — Z78.9 ALLERGY HISTORY UNKNOWN: ICD-10-CM

## 2024-01-23 DIAGNOSIS — Z13.0 SCREENING FOR ENDOCRINE, NUTRITIONAL, METABOLIC AND IMMUNITY DISORDER: ICD-10-CM

## 2024-01-23 DIAGNOSIS — F90.2 ADHD (ATTENTION DEFICIT HYPERACTIVITY DISORDER), COMBINED TYPE: ICD-10-CM

## 2024-01-23 LAB
25(OH)D3 SERPL-MCNC: 15.6 NG/ML (ref 30–100)
ALBUMIN SERPL BCP-MCNC: 4.6 G/DL (ref 3.5–5)
ALP SERPL-CCNC: 48 U/L (ref 34–104)
ALT SERPL W P-5'-P-CCNC: 25 U/L (ref 7–52)
ANION GAP SERPL CALCULATED.3IONS-SCNC: 4 MMOL/L
AST SERPL W P-5'-P-CCNC: 19 U/L (ref 13–39)
B-HCG SERPL-ACNC: <1 MIU/ML (ref 0–5)
BASOPHILS # BLD AUTO: 0.02 THOUSANDS/ÂΜL (ref 0–0.1)
BASOPHILS NFR BLD AUTO: 1 % (ref 0–1)
BILIRUB SERPL-MCNC: 0.49 MG/DL (ref 0.2–1)
BUN SERPL-MCNC: 9 MG/DL (ref 5–25)
C TRACH DNA SPEC QL NAA+PROBE: NEGATIVE
CALCIUM SERPL-MCNC: 9.4 MG/DL (ref 8.4–10.2)
CHLORIDE SERPL-SCNC: 103 MMOL/L (ref 96–108)
CHOLEST SERPL-MCNC: 181 MG/DL
CO2 SERPL-SCNC: 31 MMOL/L (ref 21–32)
CREAT SERPL-MCNC: 0.72 MG/DL (ref 0.6–1.3)
EOSINOPHIL # BLD AUTO: 0.08 THOUSAND/ÂΜL (ref 0–0.61)
EOSINOPHIL NFR BLD AUTO: 3 % (ref 0–6)
ERYTHROCYTE [DISTWIDTH] IN BLOOD BY AUTOMATED COUNT: 12 % (ref 11.6–15.1)
EST. AVERAGE GLUCOSE BLD GHB EST-MCNC: 100 MG/DL
GFR SERPL CREATININE-BSD FRML MDRD: 111 ML/MIN/1.73SQ M
GLUCOSE SERPL-MCNC: 116 MG/DL (ref 65–140)
HBA1C MFR BLD: 5.1 %
HBV SURFACE AG SER QL: NORMAL
HCT VFR BLD AUTO: 43.3 % (ref 34.8–46.1)
HCV AB SER QL: NORMAL
HDLC SERPL-MCNC: 60 MG/DL
HGB BLD-MCNC: 14.3 G/DL (ref 11.5–15.4)
IMM GRANULOCYTES # BLD AUTO: 0.01 THOUSAND/UL (ref 0–0.2)
IMM GRANULOCYTES NFR BLD AUTO: 0 % (ref 0–2)
LDLC SERPL CALC-MCNC: 102 MG/DL (ref 0–100)
LYMPHOCYTES # BLD AUTO: 1.08 THOUSANDS/ÂΜL (ref 0.6–4.47)
LYMPHOCYTES NFR BLD AUTO: 33 % (ref 14–44)
MCH RBC QN AUTO: 30.4 PG (ref 26.8–34.3)
MCHC RBC AUTO-ENTMCNC: 33 G/DL (ref 31.4–37.4)
MCV RBC AUTO: 92 FL (ref 82–98)
MONOCYTES # BLD AUTO: 0.31 THOUSAND/ÂΜL (ref 0.17–1.22)
MONOCYTES NFR BLD AUTO: 10 % (ref 4–12)
N GONORRHOEA DNA SPEC QL NAA+PROBE: NEGATIVE
NEUTROPHILS # BLD AUTO: 1.74 THOUSANDS/ÂΜL (ref 1.85–7.62)
NEUTS SEG NFR BLD AUTO: 53 % (ref 43–75)
NRBC BLD AUTO-RTO: 0 /100 WBCS
PLATELET # BLD AUTO: 176 THOUSANDS/UL (ref 149–390)
PMV BLD AUTO: 10.2 FL (ref 8.9–12.7)
POTASSIUM SERPL-SCNC: 4.2 MMOL/L (ref 3.5–5.3)
PROT SERPL-MCNC: 7.7 G/DL (ref 6.4–8.4)
RBC # BLD AUTO: 4.7 MILLION/UL (ref 3.81–5.12)
SODIUM SERPL-SCNC: 138 MMOL/L (ref 135–147)
TREPONEMA PALLIDUM IGG+IGM AB [PRESENCE] IN SERUM OR PLASMA BY IMMUNOASSAY: NORMAL
TRIGL SERPL-MCNC: 94 MG/DL
TSH SERPL DL<=0.05 MIU/L-ACNC: 0.8 UIU/ML (ref 0.45–4.5)
WBC # BLD AUTO: 3.24 THOUSAND/UL (ref 4.31–10.16)

## 2024-01-23 PROCEDURE — 82785 ASSAY OF IGE: CPT

## 2024-01-23 PROCEDURE — 82306 VITAMIN D 25 HYDROXY: CPT

## 2024-01-23 PROCEDURE — 36415 COLL VENOUS BLD VENIPUNCTURE: CPT

## 2024-01-23 PROCEDURE — 84702 CHORIONIC GONADOTROPIN TEST: CPT

## 2024-01-23 PROCEDURE — 86780 TREPONEMA PALLIDUM: CPT

## 2024-01-23 PROCEDURE — 87340 HEPATITIS B SURFACE AG IA: CPT

## 2024-01-23 PROCEDURE — 87536 HIV-1 QUANT&REVRSE TRNSCRPJ: CPT

## 2024-01-23 PROCEDURE — 80053 COMPREHEN METABOLIC PANEL: CPT

## 2024-01-23 PROCEDURE — 85025 COMPLETE CBC W/AUTO DIFF WBC: CPT

## 2024-01-23 PROCEDURE — 80061 LIPID PANEL: CPT

## 2024-01-23 PROCEDURE — 84443 ASSAY THYROID STIM HORMONE: CPT

## 2024-01-23 PROCEDURE — 86803 HEPATITIS C AB TEST: CPT

## 2024-01-23 PROCEDURE — 83036 HEMOGLOBIN GLYCOSYLATED A1C: CPT

## 2024-01-23 PROCEDURE — 86008 ALLG SPEC IGE RECOMB EA: CPT

## 2024-01-23 PROCEDURE — 86003 ALLG SPEC IGE CRUDE XTRC EA: CPT

## 2024-01-23 NOTE — TELEPHONE ENCOUNTER
Patient is calling again for the letter by Friday. Due to moving to another place.  She will either  the letter or it can be sent via email.

## 2024-01-23 NOTE — TELEPHONE ENCOUNTER
Elise left an additional message regarding [employer] and her move next week.     She also requested a refill of Adderall.

## 2024-01-24 DIAGNOSIS — F90.2 ADHD (ATTENTION DEFICIT HYPERACTIVITY DISORDER), COMBINED TYPE: ICD-10-CM

## 2024-01-24 RX ORDER — DEXTROAMPHETAMINE SACCHARATE, AMPHETAMINE ASPARTATE MONOHYDRATE, DEXTROAMPHETAMINE SULFATE AND AMPHETAMINE SULFATE 6.25; 6.25; 6.25; 6.25 MG/1; MG/1; MG/1; MG/1
25 CAPSULE, EXTENDED RELEASE ORAL EVERY MORNING
Qty: 30 CAPSULE | Refills: 0
Start: 2024-01-24 | End: 2024-01-24 | Stop reason: SDUPTHER

## 2024-01-24 RX ORDER — DEXTROAMPHETAMINE SACCHARATE, AMPHETAMINE ASPARTATE MONOHYDRATE, DEXTROAMPHETAMINE SULFATE AND AMPHETAMINE SULFATE 6.25; 6.25; 6.25; 6.25 MG/1; MG/1; MG/1; MG/1
25 CAPSULE, EXTENDED RELEASE ORAL EVERY MORNING
Qty: 30 CAPSULE | Refills: 0 | Status: SHIPPED | OUTPATIENT
Start: 2024-01-24 | End: 2024-02-27 | Stop reason: SDUPTHER

## 2024-01-24 NOTE — PROGRESS NOTES
Elise Iyer called the clinic today and appropriately requested refill of controlled psychotropic medications (Adderall). Review of PDMP website reveals no concerns for abuse or misuse. As such, will refill script today for 30-days as I am covering for patient's primary provider.

## 2024-01-24 NOTE — TELEPHONE ENCOUNTER
Patient requested refill for:  Requested Prescriptions     Pending Prescriptions Disp Refills    amphetamine-dextroamphetamine (ADDERALL XR, 25MG,) 25 MG 24 hr capsule 30 capsule 0     Sig: Take 1 capsule (25 mg total) by mouth every morning Max Daily Amount: 25 mg       Refill request was sent to Dr. Lc Rizo, my indirect supervisor, who will review and decide to approve/send to pharmacy.    PDMP reviewed on 01/24/24. Elise has been appropriately adherent to their controlled psychotropic medication regimen in the absence of apparent abuse or misuse. Therefore, this writer will send a 30-day refill to their pharmacy of choice including recommendation for patient to adhere to their outpatient appointment(s) with this writer to assure appropriate continuity of care.      Morgan aSnz MD

## 2024-01-24 NOTE — LETTER
January 25, 2024     To whom it may concern,    Patient: Elise Iyer   YOB: 1990   Date of Visit: 1/24/2024       Dear Dr. Mcneill Recipients:    Thank you for referring Elise Iyer to me for evaluation. Below are my notes for this consultation.    If you have questions, please do not hesitate to call me. I look forward to following your patient along with you.         Sincerely,        Morgan         CC:   No Recipients    Lc Rizo MD  1/24/2024 12:52 PM  Sign when Signing Visit  Elise Iyer called the clinic today and appropriately requested refill of controlled psychotropic medications (Adderall). Review of PDMP website reveals no concerns for abuse or misuse. As such, will refill script today for 30-days as I am covering for patient's primary provider.

## 2024-01-25 ENCOUNTER — TELEPHONE (OUTPATIENT)
Dept: PSYCHIATRY | Facility: CLINIC | Age: 34
End: 2024-01-25

## 2024-01-25 LAB
A-LACTALB IGE QN: <0.1 KAU/I
ALMOND IGE QN: 0.37 KUA/I
ARA H6 PEANUT: <0.1 KUA/I
B-LACTOGLOB IGE QN: <0.1 KAU/I
CASEIN IGE QN: <0.1 KAU/I
CASHEW NUT IGE QN: <0.1 KUA/I
CODFISH IGE QN: <0.1 KUA/I
EGG WHITE IGE QN: 0.14 KUA/I
GLUTEN IGE QN: <0.1 KUA/I
HAZELNUT IGE QN: 9.64 KUA/L
HIV1 RNA # SERPL NAA+PROBE: <20 COPIES/ML
HIV1 RNA SERPL NAA+PROBE-LOG#: NORMAL LOG10COPY/ML
MILK IGE QN: 0.13 KUA/I
OVALB IGE QN: <0.1 KAU/I
OVOMUCOID IGE QN: <0.1 KAU/I
PEANUT (RARA H) 1 IGE QN: <0.1 KUA/I
PEANUT (RARA H) 2 IGE QN: <0.1 KUA/I
PEANUT (RARA H) 3 IGE QN: <0.1 KUA/I
PEANUT (RARA H) 8 IGE QN: 3.42 KUA/I
PEANUT (RARA H) 9 IGE QN: <0.1 KUA/I
PEANUT IGE QN: 0.42 KUA/I
SALMON IGE QN: <0.1 KUA/I
SCALLOP IGE QN: 0.23 KUA/L
SESAME SEED IGE QN: 0.38 KUA/I
SHRIMP IGE QN: 3.08 KUA/L
SOYBEAN IGE QN: 0.18 KUA/I
TOTAL IGE SMQN RAST: 473 KU/L (ref 0–113)
TUNA IGE QN: <0.1 KUA/I
WALNUT IGE QN: 0.1 KUA/I
WHEAT IGE QN: 0.15 KUA/I

## 2024-01-25 NOTE — TELEPHONE ENCOUNTER
Spoke to patient for 3 minutes on 1/25/2024 at 0855 to discuss emotional support dog letter that will be scanned into chart and sent to patient.    Morgan Sanz MD  1/25/2024 at 0900   yes

## 2024-01-25 NOTE — LETTER
24       Elise Iyer   [address] [place] [id number]       To Whom It May Concern,    Elise Iyer (: 1990) is a patient of mine at the [facility] outpatient clinic. Elise Iyer has been under my care since 2022, for a mental disability found in the DSM-5 (Diagnostic and Statistical Manual of Mental Disorders). I am a psychiatrist licensed in the [facility]. I am familiar with Elise Iyer's history and related mental health symptoms.    Due to her mental health symptomatology, Elise Iyer has certain limitations, including: symptoms (i.e. those related to coping with stress, anxiety, and negative emotion). Elise Iyer's general sense of unease manifests frequently as both psychological distress and somatic disturbances (i.e. autonomic arousal). In order to help alleviate these difficulties and to enhance her day-to-day functionality, I have prescribed Elise Iyer to obtain a pet to serve as an emotional support animal. I am cognizant of the extensive professional literature concerning the therapeutic benefits of such animals for individuals consumed by pathologic anxiety. The presence of this emotional support animal should help mitigate the symptoms that she experiences and is necessary for supporting her overall mental health.     Please contact me if you have any questions.       Sincerely,        Morgan Sanz MD    Kind Regards,

## 2024-01-25 NOTE — TELEPHONE ENCOUNTER
Writer will place MAIRA at  to be signed before releasing letter to patient.  Patient said she will be in tomorrow 1/26 to sign MAIRA.

## 2024-01-26 ENCOUNTER — TELEPHONE (OUTPATIENT)
Dept: FAMILY MEDICINE CLINIC | Facility: CLINIC | Age: 34
End: 2024-01-26

## 2024-01-26 NOTE — TELEPHONE ENCOUNTER
Patient called again regarding her labs. She is worried because she is moving to a house that has cats and she herself has a dog. Please advise.

## 2024-01-29 NOTE — TELEPHONE ENCOUNTER
Informed and pt states she is staying with her mom for a month and she has a cat and when she is there she has difficulty breathing.

## 2024-01-31 ENCOUNTER — TELEMEDICINE (OUTPATIENT)
Dept: PSYCHIATRY | Facility: CLINIC | Age: 34
End: 2024-01-31

## 2024-01-31 DIAGNOSIS — F90.2 ADHD (ATTENTION DEFICIT HYPERACTIVITY DISORDER), COMBINED TYPE: ICD-10-CM

## 2024-01-31 DIAGNOSIS — E55.9 VITAMIN D DEFICIENCY: ICD-10-CM

## 2024-01-31 DIAGNOSIS — F31.81 BIPOLAR 2 DISORDER, MAJOR DEPRESSIVE EPISODE (HCC): Primary | ICD-10-CM

## 2024-01-31 DIAGNOSIS — F41.1 GENERALIZED ANXIETY DISORDER: ICD-10-CM

## 2024-01-31 PROCEDURE — NC001 PR NO CHARGE: Performed by: STUDENT IN AN ORGANIZED HEALTH CARE EDUCATION/TRAINING PROGRAM

## 2024-01-31 RX ORDER — MELATONIN
1000 DAILY
Qty: 30 TABLET | Refills: 0 | Status: SHIPPED | OUTPATIENT
Start: 2024-01-31 | End: 2024-03-01

## 2024-01-31 RX ORDER — LAMOTRIGINE 200 MG/1
TABLET ORAL
Qty: 90 TABLET | Refills: 0 | Status: SHIPPED | OUTPATIENT
Start: 2024-01-31

## 2024-01-31 RX ORDER — LAMOTRIGINE 25 MG/1
TABLET ORAL
Qty: 180 TABLET | Refills: 0 | Status: SHIPPED | OUTPATIENT
Start: 2024-01-31

## 2024-01-31 NOTE — PSYCH
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 incomplete sentences are an occasional consequence of the system due to software limitation, ambient noise, and hardware issues.  Any formal questions or concerns about the context, text, or information contained within the body of this dictation should be directly addressed to the physician for clarification.    Morgan Sanz MD

## 2024-01-31 NOTE — PATIENT INSTRUCTIONS
"Please call the office nursing staff for medication issues including refills, problems getting medications, bothersome side effects, etc., at [phone].     Please return for a follow up appointment as discussed and arrive approximately 15 minutes prior to your appointment time. If you are running late or are unable to attend your appointment, please call our [place] office at [phone] (fax: [phone]), or if you were seen in the [facility] office, please call [phone] (fax [phone]).      Recommendations regarding insomnia:  Wake-up at the same time every day  Refrain from \"napping\".  Refrain from going to bed unless you're tired  Utilize your bedroom for sleep only.  Avoid use of electronics including television and/or cellphone/computers.  Refrain from use of electronics including television and/or cellphones/computers prior to bed  Turn your alarm clock away so the light is not visible.  Attempt relaxation using various means like reading if you're restless in bed for approximately 15-20 minutes.  Participate in regular physical activities like exercise, although avoid approximately 3-4 hours prior to bed.  Morning exercise is ideal.  Avoid caffeine use prior to bedtime.  Consider tapering down excessive use of caffeine.  Avoid tobacco use prior to bedtime.  Avoid alcohol use prior to bedtime.  Consider reading \"No More Sleepless nights\" by Baljinder Hitchcock, Ph.D.  Consider use of online resources including:  http://SchoolFeed/cbt-online-insomnia-treatment.html  http://www.[employer]  CBT-I  Lisa on your Smart Phone.  Go! To Sleep by the [facility].    Look up \"grounding techniques\" and/or \"anchoring demonstration\" online and try a few to see what may work for you. Practice these skills before you need them, when you are not feeling too anxious or triggered. You can also search for free guided meditation videos online to help improve your head space when you are feeling " "very anxious or triggered.        Healthy Diet   The American Heart Association and the American College of Cardiology have long recommended a healthy diet for not only patients who are at risk for atherosclerotic cardiovascular disease (ASCVD) but also the general public. In keeping with this evidence-based recommendation, the \"2018 Guideline on the Management of Blood Cholesterol\" stresses that a healthy diet should include adequate intake of these essentials:   Vegetables, fruits, and whole grains   Legumes and nuts   Low-fat dairy products   Low-fat poultry (without the skin)   Fish and seafood   Nontropical vegetable oils     The recent guidelines do provide room for cultural food preferences in a healthy diet, but in general, all patients should limit their intake of saturated and avoid all trans fats, sweets, sugar-sweetened beverages, and red meats.  Please maintain adequate hydration of at least 2 Liters of water per day, and improve nutrition by decreasing portion sizes, avoiding fried foods, fast foods, inappropriate snacking and overly processed and packaged items with 'added sugars' (whether in drinks or foods), and observing nutritional facts information on any items that are packaged to reduce intake of saturated fats and AVOID trans fats as mentioned above. Again, consume whole foods such as vegetables, higher lean protein intake, and using healthier lifestyle plans such as the Mediterranean diet with healthier fats such as those from seeds, nuts, and using organic extra virgin olive oil or avocado oil in lieu of processed vegetable oils or margarine.    Physical Activity   Recommended DAILY exercise for at least 150 minutes of moderate exercise weekly!  In addition to a healthy diet, all patients should include regular physical activity in their weekly routines, at moderate to vigorous intensity. Any activity is better than nothing, so if your patients can't meet the recommendation of vigorous " activity, moderate-intensity activity can still help them reduce their risk of ASCVD.     Below are the American Heart Association's recommendations for physical activity per week (preferably spread throughout the week):     For Overall Cardiovascular Health and Lowering Cholesterol   At least 150 minutes of moderate-intensity physical activity (for example, 30 minutes, 5 days a week), or   At least 75 minutes of vigorous-intensity physical activity (for example, 25 minutes, 3 days a week); or   A combination of moderate- and vigorous-intensity aerobic activity, and   At least 2 days of moderate- to high-intensity muscle-strengthening activities (such as resistance weight training) for additional health benefits    If you have thoughts of harming yourself or are otherwise in psychological crisis, do not hesitate to contact your [facility] Crisis hotline, or 911 or go to the nearest emergency room.  Adult Crisis Numbers  Suicide Prevention Hotline - Dial 9-8-8  [facility]: [phone] or [phone]  [facility]: [phone]  [facility]: [phone]  [facility]: [phone]  [facility]: [phone] or [phone]  [facility]: [phone]  [facility]: [phone] or [facility] Crisis: [phone]  [place] [facility]: [phone] (daytime).       [phone] (after hours, weekends, holidays)     Child/Adolescent Crisis Numbers   [facility]: [phone]   [facility]: [phone]   [place]: [phone]   [facility]: [phone]  Please note: Some [facility] do not have a separate number for Child/Adolescent specific crisis. If your county is not listed under Child/Adolescent, please call the adult number for your county     National Talk to Text Line   All Ages - 923-336    National Suicide Prevention Hotline: 1-460.380.2275 or call 112.

## 2024-02-05 ENCOUNTER — TELEPHONE (OUTPATIENT)
Dept: PSYCHIATRY | Facility: CLINIC | Age: 34
End: 2024-02-05

## 2024-02-05 NOTE — TELEPHONE ENCOUNTER
Left voicemail informing patient of the Psych Encounter form needing to be signed as a requirement from the insurance company for billing purposes. Patient can access form via [employer] and sign electronically.     Please make patient aware this form must be signed for each visit as a requirement to continue future visits with provider.

## 2024-02-08 ENCOUNTER — OFFICE VISIT (OUTPATIENT)
Dept: GYNECOLOGY | Facility: CLINIC | Age: 34
End: 2024-02-08
Payer: MEDICARE

## 2024-02-08 ENCOUNTER — ULTRASOUND (OUTPATIENT)
Dept: GYNECOLOGY | Facility: CLINIC | Age: 34
End: 2024-02-08
Payer: MEDICARE

## 2024-02-08 VITALS — SYSTOLIC BLOOD PRESSURE: 126 MMHG | DIASTOLIC BLOOD PRESSURE: 70 MMHG | WEIGHT: 189.8 LBS | BODY MASS INDEX: 31.58 KG/M2

## 2024-02-08 DIAGNOSIS — R10.2 PELVIC PAIN: Primary | ICD-10-CM

## 2024-02-08 DIAGNOSIS — B37.31 VAGINAL CANDIDIASIS: ICD-10-CM

## 2024-02-08 DIAGNOSIS — Z97.5 IUD CONTRACEPTION: ICD-10-CM

## 2024-02-08 DIAGNOSIS — R10.2 FEMALE PELVIC PAIN: Primary | ICD-10-CM

## 2024-02-08 PROCEDURE — 87210 SMEAR WET MOUNT SALINE/INK: CPT | Performed by: OBSTETRICS & GYNECOLOGY

## 2024-02-08 PROCEDURE — 99214 OFFICE O/P EST MOD 30 MIN: CPT | Performed by: OBSTETRICS & GYNECOLOGY

## 2024-02-08 PROCEDURE — 76830 TRANSVAGINAL US NON-OB: CPT | Performed by: OBSTETRICS & GYNECOLOGY

## 2024-02-08 RX ORDER — FLUCONAZOLE 150 MG/1
150 TABLET ORAL ONCE
Qty: 2 TABLET | Refills: 0 | Status: SHIPPED | OUTPATIENT
Start: 2024-02-08 | End: 2024-02-08

## 2024-02-08 NOTE — PROGRESS NOTES
AMB US Pelvic Non OB    Date/Time: 2/8/2024 9:00 AM    Performed by: Heidi Pitts  Authorized by: Jae Goodman MD  Universal Protocol:  Consent given by: patient  Patient understanding: patient states understanding of the procedure being performed  Patient identity confirmed: verbally with patient    Procedure details:     SIS Procedure: No    Indications: non-obstetric abdominal pain      Technique:  Transvaginal US, Non-OB    Position: lithotomy exam    Uterine findings:     Length (cm): 8.33    Height (cm):  4.7    Width (cm):  5.6    Endometrial stripe: identified      Endometrium thickness (mm):  8.5  Left ovary findings:     Left ovary:  Visualized    Length (cm): 3.69    Height (cm): 2.07    Width (cm): 2.66  Right ovary findings:     Right ovary:  Visualized    Length (cm): 4.62    Height (cm): 3.13    Width (cm): 3.71  Other findings:     Free pelvic fluid: not identified      Free peritoneal fluid: not identified    Post-Procedure Details:     Impression:  Retroverted uterus demonstrates an IUD in good position. Otherwise, the uterus and bilateral ovaries appear within normal limits. No free fluid.     Tolerance:  Tolerated well, no immediate complications    Complications: no complications    Additional Procedure Comments:      PIE Software F8 E8C-RS transvaginal transducer Serial # [id number] was used to perform the examination today and subsequently followed with high level disinfection utilizing Trophon EPR procedure.     Ultrasound performed at:     [facility]  [address] [address]  Phone:  [phone]  Fax:  [phone]

## 2024-02-08 NOTE — PROGRESS NOTES
Assessment/Plan   Diagnoses and all orders for this visit:    Female pelvic pain    IUD contraception    Vaginal candidiasis  -     POCT wet mount  -     fluconazole (DIFLUCAN) 150 mg tablet; Take 1 tablet (150 mg total) by mouth once for 1 dose Repeat in 1 week    1. pelvic discomfort-still notes this intermittently.  She denies severe symptoms.  Ultrasound today was without any focal findings.  Pelvic exam was with yeast infection as noted below without any other focal findings.  She will call return with severe pain or discomfort.  2.  Yeast-noted on exam and wet prep today.  Patient was status post antibiotics by Dr. Martin for BV now developed yeast infection.  She is interested in treatment.  She did try miconazole 7, ended last night.  Probably, some of the white discharge I see is related to miconazole.  However, she is still symptomatic.  She wishes fluconazole, electronic prescription was sent.  3.  Mirena IUD-in good position on exam today.  IUD strings seen at a length of about 2 cm.  IUD was placed 10/30/2018.  Counseled about FDA approval up to 8 years duration, due for removal with reinsertion October 2026.  4. previous concern for STD-denies any current complaints.  5. status post laparoscopic bilateral salpingectomy  6. history of endometriosis-that could be contributing to her complaints.  Ultrasound was without any focal findings such as endometrioma.  She will call return with worsening symptoms.  7.  Prior right ovarian cyst-none noted on exam today.  8.  Other-moving to [place].  She does plan to come up for the summers to see family and her kids need to see their father.  She will follow-up for yearly exam at that time.    Subjective   Patient ID: Elise Iyer is a 33 y.o. female.    There were no vitals filed for this visit.  Patient was seen today for follow-up visit.  Please see assessment plan for details.      The following portions of the patient's history were reviewed and updated  as appropriate: allergies, current medications, past family history, past medical history, past social history, past surgical history, and problem list.  Past Medical History:   Diagnosis Date    Abdominal pain     Anxiety     Asthma     as young child    Bipolar disorder (HCC)     Chronic pain disorder     right hip    Depression     Diarrhea     Endometrial disorder 2012    Endometriosis     Epigastric pain     Fatigue     History of COVID-19 1/3/2022    On 23    Irregular menses 2014    Morbid obesity (HCC)     Nausea and vomiting     Yeast infection 2017     Past Surgical History:   Procedure Laterality Date    DIAGNOSTIC LAPAROSCOPY      DIAGNOSTIC LAPAROSCOPY      DILATION AND CURETTAGE OF UTERUS      PA COLONOSCOPY FLX DX W/COLLJ SPEC WHEN PFRMD N/A 2017    Procedure: EGD with bx AND COLONOSCOPY with bx;  Surgeon: Angel Garcia MD;  Location: [facility];  Service: Gastroenterology    PA LAPAROSCOPY W/RMVL ADNEXAL STRUCTURES Bilateral 2021    Procedure: LAP SALPINGECTOMY;  fulgeration of endometriosis;  Surgeon: Jae Goodman MD;  Location: [facility];  Service: Gynecology     OB History    Para Term  AB Living   5 3 3 0 2 3   SAB IAB Ectopic Multiple Live Births   0 2 0 0 3      # Outcome Date GA Lbr Gregor/2nd Weight Sex Delivery Anes PTL Lv   5 IAB 18           4 Term 16 40w2d 08:55 / 00:07 3590 g (7 lb 14.6 oz) F Vag-Spont None  SRINI   3 Term 14    F Vag-Spont None  SRINI   2 Term 01/20/10    M Vag-Spont None  SRINI      Complications: Other Excessive Bleeding   1 IAB                Current Outpatient Medications:     amphetamine-dextroamphetamine (ADDERALL XR, 25MG,) 25 MG 24 hr capsule, Take 1 capsule (25 mg total) by mouth every morning Max Daily Amount: 25 mg, Disp: 30 capsule, Rfl: 0    aspirin-acetaminophen-caffeine (EXCEDRIN MIGRAINE) 250-250-65 MG per tablet, Take 1 tablet by mouth every 6 (six) hours as needed for headaches As needed., Disp: , Rfl:      cholecalciferol (VITAMIN D3) 1,000 units tablet, Take 1 tablet (1,000 Units total) by mouth daily, Disp: 30 tablet, Rfl: 0    ibuprofen (MOTRIN) 800 mg tablet, TOME REMBERTO TABLETA POR V A ORAL CADA SEIS HORAS CUANDO SEA NECESARIO PAIN, Disp: , Rfl:     lamoTRIgine (LaMICtal) 200 MG tablet, Take lamotrigine 200 mg (1 tablet) and a two 25 mg tablets (2 tablets) for a total of 250 mg daily, Disp: 90 tablet, Rfl: 0    lamoTRIgine (LaMICtal) 25 mg tablet, Take lamotrigine 200 mg (1 tablet) and a two 25 mg tablets (2 tablets) for a total of 250 mg daily, Disp: 180 tablet, Rfl: 0    meclizine (ANTIVERT) 25 mg tablet, Take 1 tablet (25 mg total) by mouth every 8 (eight) hours, Disp: 30 tablet, Rfl: 0    metoprolol succinate (TOPROL-XL) 25 mg 24 hr tablet, Take 1 tablet (25 mg total) by mouth daily, Disp: 90 tablet, Rfl: 3  Allergies   Allergen Reactions    Shellfish-Derived Products - Food Allergy Anaphylaxis     Rash, shortness of breath, chest pain    hosp a few times for this    Adhesive [Medical Tape] Swelling    Chlorhexidine Itching     burning    Other Swelling     All fruits     Social History     Socioeconomic History    Marital status: Single     Spouse name: None    Number of children: None    Years of education: None    Highest education level: None   Occupational History    None   Tobacco Use    Smoking status: Every Day     Current packs/day: 0.50     Types: Cigarettes     Passive exposure: Current    Smokeless tobacco: Never    Tobacco comments:     1/2 pack/ week   Vaping Use    Vaping status: Never Used   Substance and Sexual Activity    Alcohol use: Yes     Comment: 2-3 glass/day wine or liquor    Drug use: Yes     Types: Marijuana     Comment: smokes daily    Sexual activity: Not Currently     Partners: Male     Birth control/protection: I.U.D.   Other Topics Concern    None   Social History Narrative    None     Social Determinants of Health     Financial Resource Strain: Low Risk  (7/3/2023)    Overall  Financial Resource Strain (CARDIA)     Difficulty of Paying Living Expenses: Not hard at all   Food Insecurity: No Food Insecurity (7/3/2023)    Hunger Vital Sign     Worried About Running Out of Food in the Last Year: Never true     Ran Out of Food in the Last Year: Never true   Transportation Needs: No Transportation Needs (7/3/2023)    PRAPARE - Transportation     Lack of Transportation (Medical): No     Lack of Transportation (Non-Medical): No   Physical Activity: Not on file   Stress: Not on file   Social Connections: Not on file   Intimate Partner Violence: Not on file   Housing Stability: Not on file     Family History   Problem Relation Age of Onset    No Known Problems Mother     No Known Problems Father        Review of Systems   Constitutional:  Negative for chills, diaphoresis, fatigue and fever.   Respiratory:  Negative for apnea, cough, chest tightness, shortness of breath and wheezing.    Cardiovascular:  Negative for chest pain, palpitations and leg swelling.   Gastrointestinal:  Negative for abdominal distention, abdominal pain, anal bleeding, constipation, diarrhea, nausea, rectal pain and vomiting.   Genitourinary:  Positive for pelvic pain. Negative for difficulty urinating, dyspareunia, dysuria, frequency, hematuria, menstrual problem, urgency, vaginal bleeding, vaginal discharge and vaginal pain.   Musculoskeletal:  Negative for arthralgias, back pain and myalgias.   Skin:  Negative for color change and rash.   Neurological:  Negative for dizziness, syncope, light-headedness, numbness and headaches.   Hematological:  Negative for adenopathy. Does not bruise/bleed easily.   Psychiatric/Behavioral:  Negative for dysphoric mood and sleep disturbance. The patient is not nervous/anxious.        Objective   Physical Exam  OBGyn Exam     Objective      There were no vitals taken for this visit.    General:   alert and oriented, in no acute distress   Neck:    Breast:    Heart:    Lungs:    Abdomen:  soft, non-tender, without masses or organomegaly   Vulva: normal   Vagina: Moderate amount of thick white discharge, slight erythema, without lesions appreciated.  Mildly tender on bimanual exam   Cervix: Moderate amount of thick white discharge, without lesions or cervicitis.  No CMT.  IUD strings seen at length of 2 cm   Uterus: top normal size, anteverted, non-tender   Adnexa: no mass, fullness, tenderness   Rectum: deferred    Psych:  Normal mood and affect   Skin:  Without obvious lesions   Eyes: symmetric, with normal movements and reactivity   Musculoskeletal:  Normal muscle tone and movements appreciated

## 2024-02-12 ENCOUNTER — TELEPHONE (OUTPATIENT)
Dept: PSYCHIATRY | Facility: CLINIC | Age: 34
End: 2024-02-12

## 2024-02-26 ENCOUNTER — TELEPHONE (OUTPATIENT)
Dept: PSYCHIATRY | Facility: CLINIC | Age: 34
End: 2024-02-26

## 2024-02-26 DIAGNOSIS — F90.2 ADHD (ATTENTION DEFICIT HYPERACTIVITY DISORDER), COMBINED TYPE: ICD-10-CM

## 2024-02-26 NOTE — TELEPHONE ENCOUNTER
Elise left message requesting refill for AdderallXr 25 mg.  Please send to [facility] [place], [place] [address]     She also wanted to know if papers that need to be signed are still at the .

## 2024-02-26 NOTE — TELEPHONE ENCOUNTER
Elise left an additional message regarding the refill for Adderall. She did come to the office and picked up a the letter for the CINDY, but forgot to ask about the refill. She said she is out completely and will be going out of town. She asked for a call asap as she is going away.

## 2024-02-27 NOTE — TELEPHONE ENCOUNTER
Elise left message that she has called 3 times requesting refill for Adderall 25 mg.  She is requesting call back.

## 2024-02-28 ENCOUNTER — TELEMEDICINE (OUTPATIENT)
Dept: PSYCHIATRY | Facility: CLINIC | Age: 34
End: 2024-02-28

## 2024-02-28 DIAGNOSIS — F41.1 GENERALIZED ANXIETY DISORDER: ICD-10-CM

## 2024-02-28 DIAGNOSIS — F90.2 ADHD (ATTENTION DEFICIT HYPERACTIVITY DISORDER), COMBINED TYPE: ICD-10-CM

## 2024-02-28 DIAGNOSIS — F31.81 BIPOLAR 2 DISORDER, MAJOR DEPRESSIVE EPISODE (HCC): Primary | ICD-10-CM

## 2024-02-28 DIAGNOSIS — E55.9 VITAMIN D DEFICIENCY: ICD-10-CM

## 2024-02-28 PROCEDURE — NC001 PR NO CHARGE: Performed by: PSYCHIATRY & NEUROLOGY

## 2024-02-28 RX ORDER — DEXTROAMPHETAMINE SACCHARATE, AMPHETAMINE ASPARTATE MONOHYDRATE, DEXTROAMPHETAMINE SULFATE AND AMPHETAMINE SULFATE 6.25; 6.25; 6.25; 6.25 MG/1; MG/1; MG/1; MG/1
25 CAPSULE, EXTENDED RELEASE ORAL EVERY MORNING
Start: 2024-02-28 | End: 2024-02-28 | Stop reason: SDUPTHER

## 2024-02-28 RX ORDER — LAMOTRIGINE 200 MG/1
TABLET ORAL
Qty: 90 TABLET | Refills: 0 | Status: SHIPPED | OUTPATIENT
Start: 2024-02-28

## 2024-02-28 RX ORDER — LAMOTRIGINE 25 MG/1
TABLET ORAL
Qty: 180 TABLET | Refills: 0 | Status: SHIPPED | OUTPATIENT
Start: 2024-02-28

## 2024-02-28 RX ORDER — DEXTROAMPHETAMINE SACCHARATE, AMPHETAMINE ASPARTATE MONOHYDRATE, DEXTROAMPHETAMINE SULFATE AND AMPHETAMINE SULFATE 6.25; 6.25; 6.25; 6.25 MG/1; MG/1; MG/1; MG/1
25 CAPSULE, EXTENDED RELEASE ORAL EVERY MORNING
Qty: 30 CAPSULE | Refills: 0 | Status: SHIPPED | OUTPATIENT
Start: 2024-02-28 | End: 2024-03-29

## 2024-02-28 NOTE — PSYCH
Virtual Visit Disclaimer & Required Documentation  TeleMed provider: Morgan Sanz MD. located at [facility], [address] [place] [address] in [place], [place], [facility]. The patient is also located in [place] which is the state in which I hold an active license.    The patient was identified by name and date of birth. Elise Iyer was informed that this is a telemedicine visit and that the visit is being conducted through [email or web address] and patient was informed this is a secure, HIPAA-complaint platform. She agrees to proceed. My office door was closed. No one else was in the room. She acknowledged consent and understanding of privacy and security of the video platform. The patient has agreed to participate and understands they can discontinue the visit at any time.    Elise Iyer verbally agrees to participate in Virtual Care Services. Pt is aware that Virtual Care Services could be limited without vital signs or the ability to perform a full hands-on physical exam. The patient understands she or the provider may request at any time to terminate the video visit and request the patient to seek care or treatment in person. Patient is aware this is a billable service.    Psychiatric Follow Up Visit - Behavioral Health   MRN: [id number]  Visit Time  Start: 1430. Stop: 1455. Total: 25 minutes.    Assessment/Plan   Elise Iyer is a 33 y.o. female, Single with prior psychiatric diagnoses of bipolar 2 disorder, depressive, generalized anxiety disorder, attention deficit hyperactivity disorder; with suicide risk factors including history of self-harm as recently as 18 years old, chronic mental illness, medical problems, financial problems, limited social/emotional support, anxiety, impulsivity, substance abuse and history of trauma; and medical history including migraines.        In the setting of stability on current psychotropic medication regimen, medications will be continued for a 90-day.  After which  patient will have her refills done by a new PCP in [place] where she is moving early in March 2024.  She does need refills and Adderall and she will reach out when she needs a refill to let us know the pharmacy that medication should be sent to.  She denies any SI, HI, AVH and contracts for safety, and believes this will be a positive move in her life that she looks forward to.    Patient was informed of the adverse effects of cannabis use/abuse on their physical health including: respiratory disease in addition to diminished pulmonary functioning, hypertension, heart disease including arrhythmias and/or myocardial infarction, stroke, weakening of the immune system, attention and concentration problems, behavioral problems like substance induced mood disorders and/or psychosis, increased instances of multiple malignancies possibly including the lungs, head/neck and testicles/ovaries including diminished sexual functioning in addition to problematic reproduction, hyperemesis syndrome and interactions with an individual's medication regimen in addition to risk of intoxication, particularly in the presence of sedative agents like alcohol and/or benzodiazepines. Patient was informed of the possible withdrawal syndrome associated to prevalent and persistent use of cannabis including: headache, rigors, tremulousness, anxiety, anger, irritability, insomnia, fatigue, diminished appetite including possible unintentional decrease in weight and gastrointestinal upset including nausea, vomiting and diarrhea. This writer recommended abstinence from cannabis use/abuse in the presence of the aforementioned effects. Patient was receptive to the recommendations of this writer.     Diagnosis:   Encounter Diagnoses   Name Primary?    Bipolar 2 disorder, major depressive episode (HCC) Yes    ADHD (attention deficit hyperactivity disorder), combined type     Generalized anxiety disorder     Vitamin D deficiency        Treatment  Recommendations/Precautions:  Refill medications for 90 days and patient is moving to [place] and will be following up with primary care doctor who will continue with refills.  For Adderall, she will call and let us know her [place] pharmacy when she needs refill.  Continue Lamictal 250 mg daily for mood stability and episodes of anger/irritability   Continue Adderall XR 25 mg daily for ADHD symptoms  Continue to monitor QTc moving forward due to recent diagnosis of PVCs.  She will continue following with cardiologist regularly.  Continue working with social work (administrative case management) in order to would address financial difficulties  Continue vitamin D 1000 units daily for low vitamin D level     Therapy:  Continue [facility] individual therapy (virtual), on waitlist; in the setting of stressors including 3 children single parent, financial, work, and MH difficulties  Medical:   In [place] will follow up with PCP, and from there get a referral for psychiatrist, allergist, and other necessary specialist..  Follow up with primary care physician for ongoing medical care.  Labs: Vitamin D low at 15.3.  Continue to replete.  Most recently obtained 01/23/2021, reviewed. Consider EKG at later date.         Treatment Plan:  The next plan will be due 6/15/2024.    -------------------------------------------------------    History of Present Illness   Elise Iyer is a 33 y.o. female, Single;  with prior psychiatric diagnoses of bipolar 2 disorder, depressive, generalized anxiety disorder, attention deficit hyperactivity disorder; with suicide risk factors including history of self-harm as recently as 18 years old, chronic mental illness, medical problems, financial problems, limited social/emotional support, anxiety, impulsivity, substance abuse and history of trauma; and medical history including migraines; now presenting to follow up for anxiety, depression, mood instability, bipolar disorder, Irritability, focus  problems and trauma-related symptoms.        Today, Elise reports that she is doing well today.  While she is nervous for her move to [place], she feels like she is stable on her medications and will follow up with her PCP in [place] who will refer her out to appropriate physicians including psychiatrist and allergist.  She is compliant with her medications, denies any SI, HI, AVH, manic episodes, OCD behaviors, PTSD related symptoms, somatic symptoms, or panic attacks.  She has significantly decreased her marijuana use and has stopped cigarette smoking altogether but has started dating to a much lesser degree.  She has a good support system in her mother and is moving to [place] for financial reasons.  She is committed to this plan and looks forward to it.    Psychiatrist / PCP / specialists in [place]: Found pediatrician.  Will do PCP appointment and they  Anxiety: good, no change  Depression: good, no change  Stressors: increase, due to  the move  Adderall: doesn't work, needs increase, will continue ADHD treatment with follow up doctor.  Sleep: good, restful.  Irritable: denies, improving  Panic attacks: denies  Mood swings: denies  Manic episodes: denies  Marijuana:  decreased amount, continuing to stop because financial difficulties.  Stopped cigarettes 2 months ago, but replaced cravings with rare vape pen hit.  Will keep attempting to quit.  Hydroxyzine: Is not taking, requesting discontinuation.  Appetite: good, no changes.    Weight changes: denies  Thoughts on moving: signed lease, moving in to [place] early next month.  Difficult to move everything.  Vitamin D: Hair stopped falling out, significant improvement.  Allergy test:  Significant number of allergies on recent testing.  Will go to an allergist in [place].      Psychiatric Review Of Systems:  Elise reports Symptoms as described in HPI.  Elise denies Current suicidal thoughts, plan, or intent, Current thoughts of self-harm, or Current  "homicidal thoughts, plan, or intent.    Medical Review Of Systems:  Complete review of systems is negative except as noted above.    ------------------------------------  Past Medical History:   Diagnosis Date    Abdominal pain     Anxiety     Asthma     as young child    Bipolar disorder (HCC)     Chronic pain disorder     right hip    Depression     Diarrhea     Endometrial disorder 12/2012    Endometriosis     Epigastric pain     Fatigue     History of COVID-19 1/3/2022    On 12/25/21    Irregular menses 9/11/2014    Morbid obesity (HCC)     Nausea and vomiting     Yeast infection 8/23/2017      Past Surgical History:   Procedure Laterality Date    DIAGNOSTIC LAPAROSCOPY      DIAGNOSTIC LAPAROSCOPY      DILATION AND CURETTAGE OF UTERUS      NV COLONOSCOPY FLX DX W/COLLJ SPEC WHEN PFRMD N/A 8/4/2017    Procedure: EGD with bx AND COLONOSCOPY with bx;  Surgeon: Angel Garcia MD;  Location: [facility];  Service: Gastroenterology    NV LAPAROSCOPY W/RMVL ADNEXAL STRUCTURES Bilateral 5/4/2021    Procedure: LAP SALPINGECTOMY;  fulgeration of endometriosis;  Surgeon: Jae Goodman MD;  Location: [facility];  Service: Gynecology       Visit Vitals  OB Status Implant   Smoking Status Every Day      Wt Readings from Last 6 Encounters:   02/08/24 86.1 kg (189 lb 12.8 oz)   01/22/24 86.2 kg (190 lb)   01/08/24 88.4 kg (194 lb 12.8 oz)   11/20/23 85.7 kg (189 lb)   10/30/23 82.4 kg (181 lb 9.6 oz)   10/16/23 79.8 kg (176 lb)        Mental Status Exam:  Appearance:  alert, good eye contact, appears stated age, casually dressed, and appropriate grooming and hygiene   Behavior:  calm and cooperative   Motor: no abnormal movements and normal gait and balance   Speech:  spontaneous and coherent   Mood:  \"Appropriately anxious\"   Affect:  constricted   Thought Process:  Organized, logical, goal-directed   Thought Content: no verbalized delusions or overt paranoia   Perceptual disturbances: no reported hallucinations and does not appear " to be responding to internal stimuli at this time   Risk Potential: No active or passive suicidal or homicidal ideation was verbalized during interview   Cognition: oriented to self and situation, appears to be of average intelligence, and cognition not formally tested   Insight:  Good   Judgment: Fair       Meds/Allergies    Allergies   Allergen Reactions    Shellfish-Derived Products - Food Allergy Anaphylaxis     Rash, shortness of breath, chest pain    hosp a few times for this    Adhesive [Medical Tape] Swelling    Chlorhexidine Itching     burning    Other Swelling     All fruits     Current Outpatient Medications   Medication Instructions    amphetamine-dextroamphetamine (ADDERALL XR, 25MG,) 25 MG 24 hr capsule 25 mg, Oral, Every morning    aspirin-acetaminophen-caffeine (EXCEDRIN MIGRAINE) 250-250-65 MG per tablet 1 tablet, Oral, Every 6 hours PRN, As needed.    cholecalciferol 1,000 Units, Oral, Daily    ibuprofen (MOTRIN) 800 mg tablet TOME REMBERTO TABLETA POR V A ORAL CADA SEIS HORAS CUANDO SEA NECESARIO PAIN    lamoTRIgine (LaMICtal) 200 MG tablet Take lamotrigine 200 mg (1 tablet) and a two 25 mg tablets (2 tablets) for a total of 250 mg daily    lamoTRIgine (LaMICtal) 25 mg tablet Take lamotrigine 200 mg (1 tablet) and a two 25 mg tablets (2 tablets) for a total of 250 mg daily    meclizine (ANTIVERT) 25 mg, Oral, Every 8 hours scheduled    metoprolol succinate (TOPROL-XL) 25 mg, Oral, Daily        Labs & Imaging:  I have personally reviewed all pertinent laboratory tests and imaging results.   Office Visit on 02/08/2024   Component Date Value Ref Range Status    WET MOUNT 02/08/2024 Yes   Final    Yeast, Wet Prep 02/08/2024 Positive   Final    pH 02/08/2024 4-4.5   Final    Whiff Test 02/08/2024 Negative   Final    Clue Cells 02/08/2024 Negative   Final    Trich, Wet Prep 02/08/2024 Negative   Final   Appointment on 01/23/2024   Component Date Value Ref Range Status    Fish Cod 01/23/2024 <0.10  0.00 -  0.09 kUA/I Final    Egg White 01/23/2024 0.14 (H)  0.00 - 0.09 kUA/I Final    Gluten 01/23/2024 <0.10  0.00 - 0.09 kUA/I Final    Milk, Cow's 01/23/2024 0.13 (H)  0.00 - 0.09 kUA/I Final    Peanut 01/23/2024 0.42 (H)  0.00 - 0.09 kUA/I Final    G[place] 01/23/2024 <0.10  0.00 - 0.09 kUA/I Final    Scallop IgE 01/23/2024 0.23 (H)  0.00 - 0.09 kUA/l Final    Sesame Seed IgE 01/23/2024 0.38 (H)  0.00 - 0.09 kUA/I Final    Shrimp 01/23/2024 3.08 (H)  0.00 - 0.09 kUA/l Final    SOYBEAN 01/23/2024 0.18 (H)  0.00 - 0.09 kUA/I Final    Tuna IgE 01/23/2024 <0.10  0.00 - 0.09 kUA/I Final    [place] 01/23/2024 0.10 (H)  0.00 - 0.09 kUA/I Final    Wheat 01/23/2024 0.15 (H)  0.00 - 0.09 kUA/I Final    Almonds 01/23/2024 0.37 (H)  0.00 - 0.09 kUA/I Final    Cashew 01/23/2024 <0.10  0.00 - 0.09 kUA/I Final    Hazelnut 01/23/2024 9.64 (H)  0.00 - 0.09 kUA/l Final    IgE 01/23/2024 473 (H)  0 - 113 kU/l Final    WBC 01/23/2024 3.24 (L)  4.31 - 10.16 Thousand/uL Final    RBC 01/23/2024 4.70  3.81 - 5.12 Million/uL Final    Hemoglobin 01/23/2024 14.3  11.5 - 15.4 g/dL Final    Hematocrit 01/23/2024 43.3  34.8 - 46.1 % Final    MCV 01/23/2024 92  82 - 98 fL Final    MCH 01/23/2024 30.4  26.8 - 34.3 pg Final    MCHC 01/23/2024 33.0  31.4 - 37.4 g/dL Final    RDW 01/23/2024 12.0  11.6 - 15.1 % Final    MPV 01/23/2024 10.2  8.9 - 12.7 fL Final    Platelets 01/23/2024 176  149 - 390 Thousands/uL Final    nRBC 01/23/2024 0  /100 WBCs Final    Neutrophils Relative 01/23/2024 53  43 - 75 % Final    Immat GRANS % 01/23/2024 0  0 - 2 % Final    Lymphocytes Relative 01/23/2024 33  14 - 44 % Final    Monocytes Relative 01/23/2024 10  4 - 12 % Final    Eosinophils Relative 01/23/2024 3  0 - 6 % Final    Basophils Relative 01/23/2024 1  0 - 1 % Final    Neutrophils Absolute 01/23/2024 1.74 (L)  1.85 - 7.62 Thousands/µL Final    Immature Grans Absolute 01/23/2024 0.01  0.00 - 0.20 Thousand/uL Final    Lymphocytes Absolute 01/23/2024 1.08  0.60 - 4.47  Thousands/µL Final    Monocytes Absolute 01/23/2024 0.31  0.17 - 1.22 Thousand/µL Final    Eosinophils Absolute 01/23/2024 0.08  0.00 - 0.61 Thousand/µL Final    Basophils Absolute 01/23/2024 0.02  0.00 - 0.10 Thousands/µL Final    Sodium 01/23/2024 138  135 - 147 mmol/L Final    Potassium 01/23/2024 4.2  3.5 - 5.3 mmol/L Final    Chloride 01/23/2024 103  96 - 108 mmol/L Final    CO2 01/23/2024 31  21 - 32 mmol/L Final    ANION GAP 01/23/2024 4  mmol/L Final    BUN 01/23/2024 9  5 - 25 mg/dL Final    Creatinine 01/23/2024 0.72  0.60 - 1.30 mg/dL Final    Standardized to IDMS reference method    Glucose 01/23/2024 116  65 - 140 mg/dL Final    If the patient is fasting, the ADA then defines impaired fasting glucose as > 100 mg/dL and diabetes as > or equal to 123 mg/dL.    Calcium 01/23/2024 9.4  8.4 - 10.2 mg/dL Final    AST 01/23/2024 19  13 - 39 U/L Final    ALT 01/23/2024 25  7 - 52 U/L Final    Specimen collection should occur prior to Sulfasalazine administration due to the potential for falsely depressed results.     Alkaline Phosphatase 01/23/2024 48  34 - 104 U/L Final    Total Protein 01/23/2024 7.7  6.4 - 8.4 g/dL Final    Albumin 01/23/2024 4.6  3.5 - 5.0 g/dL Final    Total Bilirubin 01/23/2024 0.49  0.20 - 1.00 mg/dL Final    Use of this assay is not recommended for patients undergoing treatment with eltrombopag due to the potential for falsely elevated results.  N-acetyl-p-benzoquinone imine (metabolite of Acetaminophen) will generate erroneously low results in samples for patients that have taken an overdose of Acetaminophen.    eGFR 01/23/2024 111  ml/min/1.73sq m Final    Cholesterol 01/23/2024 181  See Comment mg/dL Final    Cholesterol:         Pediatric <18 Years        Desirable          <170 mg/dL      Borderline High    170-199 mg/dL      High               >=200 mg/dL        Adult >=18 Years            Desirable         <200 mg/dL      Borderline High   200-239 mg/dL      High               >239 mg/dL      Triglycerides 01/23/2024 94  See Comment mg/dL Final    Triglyceride:     0-9Y            <75mg/dL     10Y-17Y         <90 mg/dL       >=18Y     Normal          <150 mg/dL     Borderline High 150-199 mg/dL     High            200-499 mg/dL        Very High       >499 mg/dL    Specimen collection should occur prior to Metamizole administration due to the potential for falsely depressed results.    HDL, Direct 01/23/2024 60  >=50 mg/dL Final    LDL Calculated 01/23/2024 102 (H)  0 - 100 mg/dL Final    LDL Cholesterol:     Optimal           <100 mg/dl     Near Optimal      100-129 mg/dl     Above Optimal       Borderline High 130-159 mg/dl       High            160-189 mg/dl       Very High       >189 mg/dl         This screening LDL is a calculated result.   It does not have the accuracy of the Direct Measured LDL in the monitoring of patients with hyperlipidemia and/or statin therapy.   Direct Measure LDL ([id number]) must be ordered separately in these patients.    TSH 3RD GENERATON 01/23/2024 0.804  0.450 - 4.500 uIU/mL Final    The recommended reference ranges for TSH during pregnancy are as follows:   First trimester 0.100 to 2.500 uIU/mL   Second trimester  0.200 to 3.000 uIU/mL   Third trimester 0.300 to 3.000 uIU/m    Note: Normal ranges may not apply to patients who are transgender, non-binary, or whose legal sex, sex at birth, and gender identity differ.  Adult TSH (3rd generation) reference range follows the recommended guidelines of the American Thyroid Association, January, 2020.    Vit D, 25-Hydroxy 01/23/2024 15.6 (L)  30.0 - 100.0 ng/mL Final    Vitamin D guidelines established by Clinical Guidelines Subcommittee  of the Endocrine Society Task Force, 2011    Deficiency <20ng/ml   Insufficiency 20-30ng/ml   Sufficient  ng/ml     Hemoglobin A1C 01/23/2024 5.1  Normal 4.0-5.6%; PreDiabetic 5.7-6.4%; Diabetic >=6.5%; Glycemic control for adults with diabetes <7.0% % Final    EAG  01/23/2024 100  mg/dl Final    Syphilis Total Antibody 01/23/2024 Non-reactive  Non-Reactive Final    No serological evidence of infection with T. pallidum.  Early or incubating syphilis infection cannot be excluded.  Consider repeat testing based on clinical suspicion.    HIV-1 RNA by PCR, Qn 01/23/2024 <20  copies/mL Final    HIV-1 RNA not detected  The reportable range for this assay is 20 to 10,000,000  copies HIV-1 RNA/mL.    HIV-1 RNA Viral Load Log 01/23/2024 COMMENT  [id number]/mL Final    Unable to calculate result since non-numeric result obtained for  component test.    Hepatitis B Surface Ag 01/23/2024 Non-reactive  Non-Reactive Final    Hepatitis C Ab 01/23/2024 Non-reactive  Non-Reactive Final    HCG, Quant 01/23/2024 <1  0 - 5 mIU/mL Final    Alpha Lactalbumin 01/23/2024 <0.10  0.00 - 0.09 kAU/I Final    Beta Lactoglobulin 01/23/2024 <0.10  0.00 - 0.09 kAU/I Final    Casein 01/23/2024 <0.10  0.00 - 0.09 kAU/I Final    MAICO h1 Peanut 01/23/2024 <0.10  0.00 - 0.09 kUA/I Final    MAICO h2 Peanut 01/23/2024 <0.10  0.00 - 0.09 kUA/I Final    MAICO h3 Peanut 01/23/2024 <0.10  0.00 - 0.09 kUA/I Final    MAICO h6 Peanut 01/23/2024 <0.10  0.00 - 0.09 kUA/I Final    MAICO h8 Peanut 01/23/2024 3.42 (H)  0.00 - 0.09 kUA/I Final    MAICO h9 Peanut 01/23/2024 <0.10  0.00 - 0.09 kUA/I Final    F232 Ovalbumin 01/23/2024 <0.10  0.00 - 0.09 kAU/I Final    F233 Ovomucoid 01/23/2024 <0.10  0.00 - 0.09 kAU/I Final   Office Visit on 01/22/2024   Component Date Value Ref Range Status    N gonorrhoeae, DNA Probe 01/22/2024 Negative  Negative Final    Chlamydia trachomatis, DNA Probe 01/22/2024 Negative  Negative Final     -------------------------------------------------------    Medical Decision Making / Counseling / Coordination of Care:  The following interventions are recommended: contracts for safety at present - agrees to call Crisis Intervention Service or go to ED if feeling unsafe and patient will be discharged from our care  with a plan to follow up with her PCP in [place] for further care and appropriate referrals to psychiatry and possibly therapy . Individual supportive psychotherapy was provided.    Although patient's acute lethality risk is LOW, long-term/chronic lethality risk is mildly elevated given the risk factors listed above. However, at the current moment, Elise is future-oriented, forward-thinking, and demonstrates ability to act in a self-preserving manner as evidenced by volitionally seeking psychiatric evaluation and treatment today. To mitigate future risk, patient should adhere to treatment recommendations, avoid alcohol/illicit substance use, utilize community-based resources and familiar support, and prioritize mental health treatment. The diagnosis and treatment plan were reviewed with the patient. Risks, benefits, and alternatives to treatment were discussed. The importance of medication and treatment compliance was reviewed with the patient.   PARQ completed for the class of stimulant medications including anxiety/irritability, insomnia, appetite suppression/weight loss, abuse potential, elevated heart rate and blood pressure, seizures, activation/induction of suzy, unmasking of tic's, growth suppression in children, interactions with other medications, and risk of sudden death.   PARQ was completed for lamotrigine (Lamictal) including dizziness, headaches, sedation, blurry vision, GI upset, rash (including life-threatening Gardner-Danilo rash), and teratogenicity (cleft palate) in women of reproductive potential.     Controlled Medication Discussion:   Elise has been filling controlled prescriptions on time as prescribed according to [place] Prescription Drug Monitoring Program      -------------------------------------------------------     Historical Information:  Information is copied from the previous visit and updated today as appropriate.     Psychiatric History:   Prior psychiatric diagnoses:  ADHD, anxiety, depression, bipolar (stable on lamotrigine in past, was taken off due to pregnancy she reports)  Inpatient hospitalizations: At 16 years old for self harm arms to [facility].    Suicide attempts/self-harm: No attempts. Started cutting 16 years old, lasted on/off for 2 years until 17 yo.  Violent/aggressive behavior: Endorses, verbal, physical fights in high school.  Expelled at [employer] after 1 month of HS. Went to alternative schools ([employer] in [place] and [employer]).  Was in these schools she believes because she was on medications.  Outpatient psychiatric providers: many therapists, and psychiatrists.  Has been away from therapy/psych for 7 years.  Started researching her diagnosis and tried to manage without, mostly off medications except for what family medicine prescribed in January 2022.  Past/current psychotherapy: patient denies  Other Services: patient denies  Psychiatric medication trial: Been on medications since 7 years old.  Pregnant at 17 yo and stopped medications, then back on medications after 4 years.  So many that she cannot remember details.  Was on 6 different medications in high school, she recalls.  Remembers wellbutrin (on several times, no memories), vyvanse, lexapro, prozac, lamictal. Concerta (ineffective). Unsure if any worked, she is not sure if she had side effects or if she self discontinued.  Has been off of medications for 7 years.  States many of her medications were discontinued when she became pregnant, and were not restarted after pregnancy.  She does not have any memory of how long she was on certain medications, whether they worked, or whether if there were side effects.     Substance Abuse History:  Patient reports Marijuana everyday 1x/day at night for 2 years.  Started at 15 years old, periods where there was heavier use.  Alcohol use 1 glass of wine with dinner.  Never drank much until a few years ago. No other drug abuse or  use.         I have assessed this patient for substance use within the past 12 months.     Family Psychiatric History:   Patient denies any known family history of suicide attempt or substance abuse  Dad - mental illness, unsure.  Father was in [employer] from age 2-18 during her childhood, minimal contact with him, although not on bad terms.  Son - ADHD, depression, anxiety (on no meds, was taken off at start of pandemic, doing well without)     Social History  Early life/developmental: Special schooling, unsure if IEP.  Had her first child at 15 years old.  Family: Father was in [employer] from age 4-18 during her childhood, minimal contact with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

## 2024-02-28 NOTE — PROGRESS NOTES
PDMP website reviewed. Elise has been appropriately adherent to controlled psychotropic medications without evidence of abuse or misuse. As such, will send 30-day refill to pharmacy of choice and follow up as necessary.

## 2024-02-28 NOTE — TELEPHONE ENCOUNTER
Patient requested refill for:  Requested Prescriptions     Pending Prescriptions Disp Refills    amphetamine-dextroamphetamine (ADDERALL XR, 25MG,) 25 MG 24 hr capsule       Sig: Take 1 capsule (25 mg total) by mouth every morning Max Daily Amount: 25 mg       Refill request was sent to Dr. Lc Rizo, my indirect supervisor, who will review and decide to approve/send to pharmacy.    PDMP reviewed on 02/28/24. Elise has been appropriately adherent to their controlled psychotropic medication regimen in the absence of apparent abuse or misuse. Therefore, this writer will send a 30-day refill to their pharmacy of choice including recommendation for patient to adhere to their outpatient appointment(s) with this writer to assure appropriate continuity of care.      Morgan Sanz MD

## 2024-02-29 ENCOUNTER — TELEPHONE (OUTPATIENT)
Dept: CARDIOLOGY CLINIC | Facility: CLINIC | Age: 34
End: 2024-02-29

## 2024-02-29 NOTE — TELEPHONE ENCOUNTER
Pt is calling is feeling her medication may need to be inceased because of she is feeling her heartbeats.  Pt said felt bad yesterday.  Would like to discuss with Dr Mcghee and said [place] on [place] so its important.  Please advise.  No dizziness or chest pain. Pt unsure of her HR at rest did ask her that..

## 2024-03-04 ENCOUNTER — TELEPHONE (OUTPATIENT)
Dept: PSYCHIATRY | Facility: CLINIC | Age: 34
End: 2024-03-04

## 2024-03-05 DIAGNOSIS — E55.9 VITAMIN D DEFICIENCY: ICD-10-CM

## 2024-03-06 RX ORDER — CHOLECALCIFEROL (VITAMIN D3) 25 MCG
1000 TABLET ORAL DAILY
Qty: 90 TABLET | Refills: 0 | Status: SHIPPED | OUTPATIENT
Start: 2024-03-06

## 2024-03-06 NOTE — TELEPHONE ENCOUNTER
Patient requested refill of :  Requested Prescriptions     Pending Prescriptions Disp Refills    Vitamin D3 25 MCG tablet [Pharmacy Med Name: VITAMIN D3 25 MCG TABLET] 90 tablet 0     Sig: TAKE 1 TABLET BY MOUTH EVERY DAY         Refill request approved and sent to pharmacy on file per patient request.     Morgan Sanz MD

## 2024-03-11 ENCOUNTER — TELEPHONE (OUTPATIENT)
Dept: PSYCHIATRY | Facility: CLINIC | Age: 34
End: 2024-03-11

## 2024-03-11 NOTE — TELEPHONE ENCOUNTER
Left voicemail informing patient of the Psych Encounter form needing to be signed as a requirement from the insurance company for billing purposes. Patient can access form via [employer] and sign electronically.     Please make patient aware this form must be signed for each visit as a requirement to continue future visits with provider.

## 2024-03-15 PROBLEM — Z02.89 ENCOUNTER FOR COMPLETION OF FORM WITH PATIENT: Status: RESOLVED | Noted: 2023-11-20 | Resolved: 2024-03-15

## 2024-03-18 ENCOUNTER — TELEPHONE (OUTPATIENT)
Dept: PSYCHIATRY | Facility: CLINIC | Age: 34
End: 2024-03-18

## 2024-03-22 NOTE — PROGRESS NOTES
Patient requested refill for:  Requested Prescriptions     Pending Prescriptions Disp Refills   • amphetamine-dextroamphetamine (ADDERALL XR, 20MG,) 20 MG 24 hr capsule 30 capsule 0     Sig: Take 1 capsule (20 mg total) by mouth every morning Max Daily Amount: 20 mg       Refill request was sent to Dr. Oswaldo Apley, my indirect supervisor, who will review and decide to approve/send to pharmacy. PDMP reviewed on 08/11/23. Aubrey Anderson has been appropriately adherent to their controlled psychotropic medication regimen in the absence of apparent abuse or misuse. Therefore, this writer will send a 30-day refill to their pharmacy of choice including recommendation for patient to adhere to their outpatient appointment(s) with this writer to assure appropriate continuity of care.       Iliana Fuentes MD [[email or web address]] : I, NORM Quintanilla, attest that I was present throughout this entire visit.  pregnant

## 2024-03-28 ENCOUNTER — DOCUMENTATION (OUTPATIENT)
Dept: PSYCHIATRY | Facility: CLINIC | Age: 34
End: 2024-03-28

## 2024-03-28 DIAGNOSIS — E55.9 VITAMIN D DEFICIENCY: ICD-10-CM

## 2024-03-28 DIAGNOSIS — F90.2 ADHD (ATTENTION DEFICIT HYPERACTIVITY DISORDER), COMBINED TYPE: ICD-10-CM

## 2024-03-28 DIAGNOSIS — F31.81 BIPOLAR 2 DISORDER, MAJOR DEPRESSIVE EPISODE (HCC): Primary | ICD-10-CM

## 2024-03-28 DIAGNOSIS — F41.1 GENERALIZED ANXIETY DISORDER: ICD-10-CM
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
## 2024-04-04 ENCOUNTER — TELEPHONE (OUTPATIENT)
Dept: PSYCHIATRY | Facility: CLINIC | Age: 34
End: 2024-04-04

## 2024-04-04 DIAGNOSIS — F90.2 ADHD (ATTENTION DEFICIT HYPERACTIVITY DISORDER), COMBINED TYPE: ICD-10-CM

## 2024-04-04 NOTE — TELEPHONE ENCOUNTER
Elise left a  requesting a refill of Adderall. She asked for a call to let her know the message was received, since it takes forever to get back to her.     [phone]

## 2024-04-05 NOTE — TELEPHONE ENCOUNTER
Attempted to call Elise twice on 4/5/2024. Send to [facility]. Will reattempt on Monday in order to get pharmacy that she would like medications sent to.    Dr. Sanz

## 2024-04-08 NOTE — TELEPHONE ENCOUNTER
Nurse spoke with Elise - she requests the Adderall refill to be sent to  [facility] on [address] in [facility].

## 2024-04-09 ENCOUNTER — TELEPHONE (OUTPATIENT)
Dept: PSYCHIATRY | Facility: CLINIC | Age: 34
End: 2024-04-09

## 2024-04-09 RX ORDER — DEXTROAMPHETAMINE SACCHARATE, AMPHETAMINE ASPARTATE MONOHYDRATE, DEXTROAMPHETAMINE SULFATE AND AMPHETAMINE SULFATE 6.25; 6.25; 6.25; 6.25 MG/1; MG/1; MG/1; MG/1
25 CAPSULE, EXTENDED RELEASE ORAL EVERY MORNING
Qty: 30 CAPSULE | Refills: 0
Start: 2024-04-09 | End: 2024-05-09

## 2024-04-09 NOTE — TELEPHONE ENCOUNTER
Patient requested refill for:  Requested Prescriptions     Pending Prescriptions Disp Refills    amphetamine-dextroamphetamine (ADDERALL XR, 25MG,) 25 MG 24 hr capsule 30 capsule 0     Sig: Take 1 capsule (25 mg total) by mouth every morning Max Daily Amount: 25 mg       Refill request was sent to Dr. Lc Rizo, my indirect supervisor, who will review and decide to approve/send to pharmacy.    PDMP reviewed on 04/09/24. Elise has been appropriately adherent to their controlled psychotropic medication regimen in the absence of apparent abuse or misuse. Therefore, this writer will send a 30-day refill to their pharmacy of choice including recommendation for patient to adhere to their outpatient appointment(s) with this writer to assure appropriate continuity of care.      Morgan Sanz MD

## 2024-04-09 NOTE — TELEPHONE ENCOUNTER
DISCHARGE LETTER for Morgan Sanz (certified and regular) placed in outgoing mail on 04/09/24.    Article #:  [id number]    Address:  [address] [place] [id number]

## 2024-04-15 DIAGNOSIS — F90.2 ADHD (ATTENTION DEFICIT HYPERACTIVITY DISORDER), COMBINED TYPE: ICD-10-CM

## 2024-04-15 RX ORDER — DEXTROAMPHETAMINE SACCHARATE, AMPHETAMINE ASPARTATE MONOHYDRATE, DEXTROAMPHETAMINE SULFATE AND AMPHETAMINE SULFATE 6.25; 6.25; 6.25; 6.25 MG/1; MG/1; MG/1; MG/1
25 CAPSULE, EXTENDED RELEASE ORAL EVERY MORNING
Qty: 30 CAPSULE | Refills: 0 | Status: SHIPPED | OUTPATIENT
Start: 2024-04-15 | End: 2024-05-15

## 2024-07-23 ENCOUNTER — TELEPHONE (OUTPATIENT)
Dept: FAMILY MEDICINE CLINIC | Facility: CLINIC | Age: 34
End: 2024-07-23

## 2024-07-23 NOTE — TELEPHONE ENCOUNTER
Hi, my name is Elise Iyer. I'm calling with about prescriptions that I need refilled that aren't prescribed by you guys but by psychiatrist. He asked me to ask you guys if you can refill them, if you can give me a call back at [phone]. And again, that's [phone]. My name is Elise Iyer, date of birth is [birth year]. Thank you.      Appointment scheduled to discuss.

## 2024-08-05 ENCOUNTER — OFFICE VISIT (OUTPATIENT)
Dept: FAMILY MEDICINE CLINIC | Facility: CLINIC | Age: 34
End: 2024-08-05

## 2024-08-05 VITALS
SYSTOLIC BLOOD PRESSURE: 104 MMHG | WEIGHT: 192.9 LBS | DIASTOLIC BLOOD PRESSURE: 79 MMHG | OXYGEN SATURATION: 99 % | HEART RATE: 64 BPM | RESPIRATION RATE: 18 BRPM | BODY MASS INDEX: 32.14 KG/M2 | TEMPERATURE: 97.8 F | HEIGHT: 65 IN

## 2024-08-05 DIAGNOSIS — F90.2 ADHD (ATTENTION DEFICIT HYPERACTIVITY DISORDER), COMBINED TYPE: Primary | ICD-10-CM

## 2024-08-05 DIAGNOSIS — R42 VERTIGO: ICD-10-CM

## 2024-08-05 DIAGNOSIS — I49.3 PVC (PREMATURE VENTRICULAR CONTRACTION): ICD-10-CM

## 2024-08-05 DIAGNOSIS — Z13.31 POSITIVE SCREENING FOR DEPRESSION ON 9-ITEM PATIENT HEALTH QUESTIONNAIRE (PHQ-9): ICD-10-CM

## 2024-08-05 DIAGNOSIS — F31.81 BIPOLAR 2 DISORDER, MAJOR DEPRESSIVE EPISODE (HCC): ICD-10-CM

## 2024-08-05 DIAGNOSIS — E55.9 VITAMIN D DEFICIENCY: ICD-10-CM

## 2024-08-05 PROCEDURE — 99214 OFFICE O/P EST MOD 30 MIN: CPT

## 2024-08-05 RX ORDER — LAMOTRIGINE 25 MG/1
TABLET ORAL
Qty: 180 TABLET | Refills: 0 | Status: SHIPPED | OUTPATIENT
Start: 2024-08-05

## 2024-08-05 RX ORDER — LAMOTRIGINE 200 MG/1
TABLET ORAL
Qty: 90 TABLET | Refills: 0 | Status: SHIPPED | OUTPATIENT
Start: 2024-08-05

## 2024-08-05 RX ORDER — CHOLECALCIFEROL (VITAMIN D3) 25 MCG
1 TABLET ORAL DAILY
Qty: 90 TABLET | Refills: 0 | Status: SHIPPED | OUTPATIENT
Start: 2024-08-05

## 2024-08-05 RX ORDER — METOPROLOL SUCCINATE 25 MG/1
25 TABLET, EXTENDED RELEASE ORAL DAILY
Qty: 90 TABLET | Refills: 0 | Status: SHIPPED | OUTPATIENT
Start: 2024-08-05 | End: 2025-07-31

## 2024-08-05 RX ORDER — DEXTROAMPHETAMINE SACCHARATE, AMPHETAMINE ASPARTATE MONOHYDRATE, DEXTROAMPHETAMINE SULFATE AND AMPHETAMINE SULFATE 6.25; 6.25; 6.25; 6.25 MG/1; MG/1; MG/1; MG/1
25 CAPSULE, EXTENDED RELEASE ORAL EVERY MORNING
Qty: 30 CAPSULE | Refills: 0 | Status: SHIPPED | OUTPATIENT
Start: 2024-08-05 | End: 2024-09-04

## 2024-08-05 RX ORDER — MECLIZINE HYDROCHLORIDE 25 MG/1
25 TABLET ORAL EVERY 8 HOURS SCHEDULED
Qty: 30 TABLET | Refills: 0 | Status: SHIPPED | OUTPATIENT
Start: 2024-08-05

## 2024-08-05 NOTE — ASSESSMENT & PLAN NOTE
PHQ-2/9 Depression Screening    Little interest or pleasure in doing things: 1 - several days  Feeling down, depressed, or hopeless: 1 - several days  Trouble falling or staying asleep, or sleeping too much: 1 - several days  Feeling tired or having little energy: 1 - several days  Poor appetite or overeatin - several days  Feeling bad about yourself - or that you are a failure or have let yourself or your family down: 1 - several days  Trouble concentrating on things, such as reading the newspaper or watching television: 1 - several days  Moving or speaking so slowly that other people could have noticed. Or the opposite - being so fidgety or restless that you have been moving around a lot more than usual: 2 - more than half the days  Thoughts that you would be better off dead, or of hurting yourself in some way: 0 - not at all  PHQ-9 Score: 9  PHQ-9 Interpretation: Mild depression      Depression Screening Follow-up Plan: Patient's depression screening was positive with a PHQ-2 score of . Their PHQ-9 score was 9. Clinically patient does not have depression. No treatment is required. Reports symptoms are moreso related to the stress of moving.

## 2024-08-05 NOTE — PROGRESS NOTES
Ambulatory Visit  Name: Elise Iyer      : 1990      MRN: [id number]  Encounter Provider: GANGA Kathleen  Encounter Date: 2024   Encounter department: [facility]    Assessment & Plan   1. ADHD (attention deficit hyperactivity disorder), combined type  -     amphetamine-dextroamphetamine (ADDERALL XR, 25MG,) 25 MG 24 hr capsule; Take 1 capsule (25 mg total) by mouth every morning Max Daily Amount: 25 mg  2. Vitamin D deficiency  -     Cholecalciferol (Vitamin D3) 25 MCG TABS; Take 1 tablet (25 mcg total) by mouth daily  3. Bipolar 2 disorder, major depressive episode (HCC)  -     lamoTRIgine (LaMICtal) 200 MG tablet; Take lamotrigine 200 mg (1 tablet) and a two 25 mg tablets (2 tablets) for a total of 250 mg daily  -     lamoTRIgine (LaMICtal) 25 mg tablet; Take lamotrigine 200 mg (1 tablet) and a two 25 mg tablets (2 tablets) for a total of 250 mg daily  4. Vertigo  -     meclizine (ANTIVERT) 25 mg tablet; Take 1 tablet (25 mg total) by mouth every 8 (eight) hours  5. PVC (premature ventricular contraction)  -     metoprolol succinate (TOPROL-XL) 25 mg 24 hr tablet; Take 1 tablet (25 mg total) by mouth daily  6. Positive screening for depression on 9-item Patient Health Questionnaire (PHQ-9)  Assessment & Plan:  PHQ-2/9 Depression Screening    Little interest or pleasure in doing things: 1 - several days  Feeling down, depressed, or hopeless: 1 - several days  Trouble falling or staying asleep, or sleeping too much: 1 - several days  Feeling tired or having little energy: 1 - several days  Poor appetite or overeatin - several days  Feeling bad about yourself - or that you are a failure or have let yourself or your family down: 1 - several days  Trouble concentrating on things, such as reading the newspaper or watching television: 1 - several days  Moving or speaking so slowly that other people could have noticed. Or the opposite - being so fidgety  or restless that you have been moving around a lot more than usual: 2 - more than half the days  Thoughts that you would be better off dead, or of hurting yourself in some way: 0 - not at all  PHQ-9 Score: 9  PHQ-9 Interpretation: Mild depression      Depression Screening Follow-up Plan: Patient's depression screening was positive with a PHQ-2 score of . Their PHQ-9 score was 9. Clinically patient does not have depression. No treatment is required. Reports symptoms are moreso related to the stress of moving.     BMI Counseling: Body mass index is 32.1 kg/m². The BMI is above normal. Nutrition recommendations include decreasing portion sizes, encouraging healthy choices of fruits and vegetables, decreasing fast food intake, consuming healthier snacks, limiting drinks that contain sugar, moderation in carbohydrate intake, increasing intake of lean protein, reducing intake of saturated and trans fat and reducing intake of cholesterol. Exercise recommendations include moderate physical activity 150 minutes/week. No pharmacotherapy was ordered. Rationale for BMI follow-up plan is due to patient being overweight or obese.     Depression Screening and Follow-up Plan: Patient's depression screening was positive with a PHQ-9 score of 9. Clincally patient does not have depression. No treatment is required.       History of Present Illness     Elise Iyer is a 33 y.o. female  has a past medical history of Abdominal pain, Anxiety, Asthma, Bipolar disorder (HCC), Chronic pain disorder, Depression, Diarrhea, Encounter for completion of form with patient, Endometrial disorder, Endometriosis, Epigastric pain, Fatigue, History of COVID-19, Irregular menses, Morbid obesity (HCC), Nausea and vomiting, and Yeast infection.  has a past surgical history that includes Diagnostic laparoscopy; Diagnostic laparoscopy; pr colonoscopy flx dx w/collj spec when pfrmd (N/A, 8/4/2017); Dilation and curettage of uterus; and pr laparoscopy  "w/rmvl adnexal structures (Bilateral, 5/4/2021).    She presents today for medication refills. She is moving to [place] tomorrow and would like medications refilled until she can establish with new PCP and mental health specialist. Denies any acute complaints.          Review of Systems   Constitutional:  Negative for chills and fever.   HENT:  Negative for ear pain and sore throat.    Eyes:  Negative for pain and visual disturbance.   Respiratory:  Negative for cough and shortness of breath.    Cardiovascular:  Negative for chest pain and palpitations.   Gastrointestinal:  Negative for abdominal pain and vomiting.   Genitourinary:  Negative for dysuria and hematuria.   Musculoskeletal:  Negative for arthralgias and back pain.   Skin:  Negative for color change and rash.   Neurological:  Negative for seizures and syncope.   All other systems reviewed and are negative.      Objective     /79 (BP Location: Left arm, Patient Position: Sitting, Cuff Size: Standard)   Pulse 64   Temp 97.8 °F (36.6 °C) (Temporal)   Resp 18   Ht 5' 5\" (1.651 m)   Wt 87.5 kg (192 lb 14.4 oz)   SpO2 99%   BMI 32.10 kg/m²     Physical Exam  Vitals and nursing note reviewed.   Constitutional:       General: She is not in acute distress.     Appearance: Normal appearance. She is well-developed.   HENT:      Head: Normocephalic and atraumatic.      Right Ear: External ear normal.      Left Ear: External ear normal.      Nose: Nose normal.   Eyes:      Conjunctiva/sclera: Conjunctivae normal.   Cardiovascular:      Rate and Rhythm: Normal rate and regular rhythm.      Pulses: Normal pulses.      Heart sounds: Normal heart sounds. No murmur heard.  Pulmonary:      Effort: Pulmonary effort is normal. No respiratory distress.      Breath sounds: Normal breath sounds.   Abdominal:      Palpations: Abdomen is soft.      Tenderness: There is no abdominal tenderness.   Musculoskeletal:         General: Normal range of motion.      Cervical " back: Normal range of motion and neck supple.   Skin:     General: Skin is warm and dry.   Neurological:      General: No focal deficit present.      Mental Status: She is alert and oriented to person, place, and time. Mental status is at baseline.   Psychiatric:         Mood and Affect: Mood normal.         Behavior: Behavior normal.         Thought Content: Thought content normal.         Judgment: Judgment normal.       Administrative Statements

## 2024-08-23 NOTE — PROGRESS NOTES
"[facility]  ED Nurse Handoff Report    ED Chief complaint: Diarrhea and Nausea  . ED Diagnosis:   Final diagnoses:   None       Allergies:   Allergies   Allergen Reactions    Corticosteroids Other (See Comments)     All oral, IV and injectable steroids are contraindicated (unless in life threatening situations) in [employer] transplant recipients. They can cause irreversible loss of islet cell function. Please contact patient's transplant care coordinator, Erlinda Multani RN BSN at [phone]/pager: [phone] and endocrinologist prior to administration.      Povidone Iodine Hives     Causes skin to blister    Nsaids      naprosyn = GI upset    Sulfasalazine Nausea and Nausea and Vomiting       Code Status: Full Code    Activity level - Baseline/Home:  independent.  Activity Level - Current:   standby.   Lift room needed: No.   Bariatric: No   Needed: No   Isolation: Yes.   Infection: Not Applicable  C-Diff Pending.     Respiratory status: Room air    Vital Signs (within 30 minutes):   Vitals:    08/22/24 1637 08/22/24 1743   BP: 124/65 139/77   Pulse: 95 79   Resp: 18    Temp: 98.4  F (36.9  C)    TempSrc: Temporal    SpO2: 95% 98%   Weight: 60.9 kg (134 lb 4.2 oz)    Height: 1.626 m (5' 4\")        Cardiac Rhythm:  ,      Pain level:    Patient confused: No.   Patient Falls Risk: nonskid shoes/slippers when out of bed.   Elimination Status: Has voided     Patient Report - Initial Complaint: abdominal pain, diarrhea.   Focused Assessment: 63 year old female presenting with diffuse abdominal pain and diarrhea.  She reports her symptoms have been going off and on for the last month.  She also endorses nausea and vomiting.  No black or bloody stool or emesis.  No fever, chills, chest pain, shortness of breath.  No urinary symptoms.  She endorses a significant amount of watery stools.  She estimates 24 watery stools in the past 24 hours.  No recent antibiotic use.  She has an " Assessment/Plan   Diagnoses and all orders for this visit:    IUD contraception    Secondary oligomenorrhea    Birth control counseling    Endometriosis    1  General exam-Pap smear deferred, self-breast awareness reviewed  2  Recurrent bacterial vaginosis-patient did take Metrogel for 10 days prescribed by me May 2020  At that time, she was counseled about boric acid and she has used that vaginally  When she gets symptomatic, she will take boric acid per vagina for about 2 days  Prompt resolution is noted  She has done this 3 times over the past 10 months or so  She will call or return with any issues  She was again counseled that boric acid is poison, should only be used vaginally  She was counseled keep this away from her children  3  Mirena IUD-placed 10/30/18  In good position on exam, IUD string seen at a length of 2-2 5 cm   4  Secondary oligomenorrhea-notes rare menses generally at 8 or 9 month intervals, lasting 2 days  She is very happy  5  STD concerns-testing was negative May 2020 and again recently on 3/11/21 for GC/chlamydia  She denies need for testing today  6  Desired sterilization-patient interested in permanent sterilization  She did scheduled for this in the past in 2016, but did not proceed with permanent sterilization  She now wishes to proceed in this fashion  Risks and benefits were discussed and all questions were answered  Additionally, other contraceptives were reviewed  She is done with childbearing and wishes permanent sterilization  Consent form was signed and mA 31 form was signed as well  She was given the packet and Hibiclens soap  She will be contacted by Catherine Black to schedule in the near future  To follow-up 2 weeks postprocedure  Subjective   Patient ID: Hyun Pickard is a 31 y o  female  Vitals:    04/02/21 0719   BP: 118/80     Patient was seen today for follow-up exam   Please see assessment plan for details        The following portions of the extensive abdominal surgery history.      Abnormal Results:   Labs Ordered and Resulted from Time of ED Arrival to Time of ED Departure   COMPREHENSIVE METABOLIC PANEL - Abnormal       Result Value    Sodium 141      Potassium 4.5      Carbon Dioxide (CO2) 22      Anion Gap 13      Urea Nitrogen 18.6      Creatinine 0.98 (*)     GFR Estimate 65      Calcium 9.6      Chloride 106      Glucose 90      Alkaline Phosphatase 123      AST 34      ALT 32      Protein Total 7.0      Albumin 4.1      Bilirubin Total 0.4     MAGNESIUM - Normal    Magnesium 1.7     CBC WITH PLATELETS AND DIFFERENTIAL    WBC Count 9.9      RBC Count 4.49      Hemoglobin 13.2      Hematocrit 41.4      MCV 92      MCH 29.4      MCHC 31.9      RDW 13.8      Platelet Count 336      % Neutrophils 71      % Lymphocytes 16      % Monocytes 10      % Eosinophils 3      % Basophils 1      % Immature Granulocytes 0      NRBCs per 100 WBC 0      Absolute Neutrophils 7.0      Absolute Lymphocytes 1.6      Absolute Monocytes 0.9      Absolute Eosinophils 0.3      Absolute Basophils 0.1      Absolute Immature Granulocytes 0.0      Absolute NRBCs 0.0     C. DIFFICILE TOXIN B PCR WITH REFLEX TO C. DIFFICILE ANTIGEN AND TOXINS A/B EIA   ENTERIC BACTERIA AND VIRUS PANEL BY PCR        CT Abdomen Pelvis w Contrast   Final Result   IMPRESSION:       1.  Status post distal gastrectomy, pancreatectomy and splenectomy with gastrojejunostomy, jejunal jejunostomy and limited bowel resection in the right lower quadrant. No mechanical obstruction or acute intra-abdominal inflammatory process.   2.  Stable nonobstructing left lower pole renal calculus.             Treatments provided: see MAR  Family Comments: involved and supportive  OBS brochure/video discussed/provided to patient:  Yes  ED Medications:   Medications   sodium chloride 0.9% BOLUS 1,000 mL (0 mLs Intravenous Stopped 8/22/24 1936)   morphine (PF) injection 4 mg (4 mg Intravenous $Given 8/22/24 9502)  patient's history were reviewed and updated as appropriate: allergies, current medications, past family history, past medical history, past social history, past surgical history and problem list   Past Medical History:   Diagnosis Date    Abdominal pain     Anxiety     Asthma     Bipolar disorder ([facility] [address] )     Chronic pain disorder     right hip    Depression     Diarrhea     Endometrial disorder 2012    Endometriosis     Epigastric pain     Fatigue     Morbid obesity ([facility] [address] )     Nausea and vomiting      Past Surgical History:   Procedure Laterality Date    DIAGNOSTIC LAPAROSCOPY      DIAGNOSTIC LAPAROSCOPY      DILATION AND CURETTAGE OF UTERUS      CA COLONOSCOPY FLX DX W/COLLJ SPEC WHEN PFRMD N/A 2017    Procedure: EGD with bx AND COLONOSCOPY with bx;  Surgeon: Lino Ardon MD;  Location: [facility]; Service: Gastroenterology     OB History    Para Term  AB Living   5 3 3 0 2 3   SAB TAB Ectopic Multiple Live Births   0 2 0 0 3      # Outcome Date GA Lbr Gregor/2nd Weight Sex Delivery Anes PTL Lv   5 TAB 18           4 Term 16 40w2d 08:55 / 00:07 3590 g (7 lb 14 6 oz) F Vag-Spont None  SRINI   3 Term 14    F Vag-Spont None  SRNII   2 Term 01/20/10    M Vag-Spont None  SRINI      Complications: Other Excessive Bleeding   1 TAB                Current Outpatient Medications:     metroNIDAZOLE (METROGEL) 0 75 % vaginal gel, Insert 1 application into the vagina daily For 5 nights  Repeat with 2nd tube   (Patient not taking: Reported on 3/11/2021), Disp: 140 g, Rfl: 2  Allergies   Allergen Reactions    Shellfish-Derived Products - Food Allergy Anaphylaxis    Other Swelling     Adhesive tape , fruits    Shellfish Allergy - Food Allergy      Social History     Socioeconomic History    Marital status: Single     Spouse name: None    Number of children: None    Years of education: None    Highest education level: None   Occupational History    None   Social Needs      dicyclomine (BENTYL) capsule 20 mg (20 mg Oral $Given 8/22/24 2009)   CT scan flush (57 mLs Intravenous $Given 8/22/24 2022)   iopamidol (ISOVUE-370) solution 500 mL (68 mLs Intravenous $Given 8/22/24 2022)       Drips infusing:  No  For the majority of the shift this patient was Green.   Interventions performed were NA.    Sepsis treatment initiated: No    Cares/treatment/interventions/medications to be completed following ED care: see inpatient orders    ED Nurse Name: Nakita Yu RN  9:08 PM   RECEIVING UNIT ED HANDOFF REVIEW    Above ED Nurse Handoff Report was reviewed: Yes  Reviewed by: Nahomy Boston RN on August 22, 2024 at 10:20 PM   SHANA Wills called the ED to inform them the note was read: Yes      Financial resource strain: None    Food insecurity     Worry: None     Inability: None    Transportation needs     Medical: None     Non-medical: None   Tobacco Use    Smoking status: Current Every Day Smoker    Smokeless tobacco: Never Used    Tobacco comment: 3 cigarettes a day   Substance and Sexual Activity    Alcohol use: Yes     Frequency: Monthly or less     Comment: Socially    Drug use: No    Sexual activity: Yes     Partners: Male     Birth control/protection: Inserts   Lifestyle    Physical activity     Days per week: None     Minutes per session: None    Stress: None   Relationships    Social connections     Talks on phone: None     Gets together: None     Attends Religion service: None     Active member of club or organization: None     Attends meetings of clubs or organizations: None     Relationship status: None    Intimate partner violence     Fear of current or ex partner: None     Emotionally abused: None     Physically abused: None     Forced sexual activity: None   Other Topics Concern    None   Social History Narrative    None     Family History   Problem Relation Age of Onset    No Known Problems Mother     No Known Problems Father        Review of Systems   Constitutional: Negative for chills, diaphoresis, fatigue and fever  Respiratory: Negative for apnea, cough, chest tightness, shortness of breath and wheezing  Cardiovascular: Negative for chest pain, palpitations and leg swelling  Gastrointestinal: Negative for abdominal distention, abdominal pain, anal bleeding, constipation, diarrhea, nausea, rectal pain and vomiting  Genitourinary: Negative for difficulty urinating, dyspareunia, dysuria, frequency, hematuria, menstrual problem, pelvic pain, urgency, vaginal bleeding, vaginal discharge and vaginal pain  Musculoskeletal: Negative for arthralgias, back pain and myalgias  Skin: Negative for color change and rash     Neurological: Negative for dizziness, syncope, light-headedness, numbness and headaches  Hematological: Negative for adenopathy  Does not bruise/bleed easily  Psychiatric/Behavioral: Negative for dysphoric mood and sleep disturbance  The patient is not nervous/anxious  Objective   Physical Exam    Objective      /80 (BP Location: Left arm, Patient Position: Sitting, Cuff Size: Adult)   Ht 5' 5" (1 651 m)   Wt 103 kg (227 lb)   LMP 03/31/2021   BMI 37 77 kg/m²     General:   alert and oriented, in no acute distress   Neck: normal to inspection and palpation   Breast: normal appearance, no masses or tenderness   Heart: Regular rate and rhythm   Lungs: Clear to auscultation   Abdomen: soft, non-tender, without masses or organomegaly   Vulva: normal   Vagina: Without erythema or lesions or discharge  Normal   Cervix: Without lesions or discharge or cervicitis  No Cervical motion tenderness    IUD string seen at a length of 2-2 5 cm   Uterus: top normal size, anteverted, non-tender   Adnexa: no mass, fullness, tenderness   Rectum: Deferred, patient declined    Psych:  Normal mood and affect   Skin:  Without obvious lesions   Eyes: symmetric, with normal movements and reactivity   Musculoskeletal:  Normal muscle tone and movements appreciated

## 2024-08-29 ENCOUNTER — TELEPHONE (OUTPATIENT)
Dept: FAMILY MEDICINE CLINIC | Facility: CLINIC | Age: 34
End: 2024-08-29

## 2024-08-29 DIAGNOSIS — F90.2 ADHD (ATTENTION DEFICIT HYPERACTIVITY DISORDER), COMBINED TYPE: ICD-10-CM

## 2024-08-29 RX ORDER — DEXTROAMPHETAMINE SACCHARATE, AMPHETAMINE ASPARTATE MONOHYDRATE, DEXTROAMPHETAMINE SULFATE AND AMPHETAMINE SULFATE 6.25; 6.25; 6.25; 6.25 MG/1; MG/1; MG/1; MG/1
25 CAPSULE, EXTENDED RELEASE ORAL EVERY MORNING
Qty: 30 CAPSULE | Refills: 0 | Status: SHIPPED | OUTPATIENT
Start: 2024-08-29 | End: 2024-09-28

## 2024-08-29 NOTE — TELEPHONE ENCOUNTER
VM: 08/29/2024    Hi, my name is Elise Iyer, phone number is [phone]. I'm calling because the last time I was in there, I was in there the beginning of August and I had prescription refills, the one Adderall one. The pharmacy on [address] did not have it in stock and they still don't have been waiting until they get an in stock but they haven't yet and I need the medication. So if they can [facility] had told me that they can transfer it somewhere else. But I'm back in [place]. I did update the pharmacy on the my chart to the one on [facility]. in [place]. If you can give me a call back and let me know if you can switch the pharmacy to that one so that I can go pick it up over here. If you can give me a call back again, it's [phone]. Thank you.        Please advise,   Thank you.

## 2024-09-03 ENCOUNTER — TELEPHONE (OUTPATIENT)
Dept: FAMILY MEDICINE CLINIC | Facility: CLINIC | Age: 34
End: 2024-09-03

## 2024-09-03 NOTE — TELEPHONE ENCOUNTER
PCP SIGNATURE NEEDED FOR [facility] FORM RECEIVED VIA FAX AND PLACED IN PCP FOLDER TO BE DELIVERED AT ASSIGNED TIMES.     Amphetamine/Dexteoampheta

## 2024-10-22 DIAGNOSIS — F90.2 ADHD (ATTENTION DEFICIT HYPERACTIVITY DISORDER), COMBINED TYPE: ICD-10-CM

## 2024-10-22 RX ORDER — DEXTROAMPHETAMINE SACCHARATE, AMPHETAMINE ASPARTATE MONOHYDRATE, DEXTROAMPHETAMINE SULFATE AND AMPHETAMINE SULFATE 6.25; 6.25; 6.25; 6.25 MG/1; MG/1; MG/1; MG/1
25 CAPSULE, EXTENDED RELEASE ORAL EVERY MORNING
Qty: 30 CAPSULE | Refills: 0 | Status: SHIPPED | OUTPATIENT
Start: 2024-10-22 | End: 2024-11-21

## 2024-10-30 DIAGNOSIS — F31.81 BIPOLAR 2 DISORDER, MAJOR DEPRESSIVE EPISODE (HCC): ICD-10-CM

## 2024-10-30 RX ORDER — LAMOTRIGINE 200 MG/1
TABLET ORAL
Qty: 90 TABLET | Refills: 0 | Status: SHIPPED | OUTPATIENT
Start: 2024-10-30

## 2024-10-31 DIAGNOSIS — I49.3 PVC (PREMATURE VENTRICULAR CONTRACTION): ICD-10-CM

## 2024-10-31 DIAGNOSIS — F31.81 BIPOLAR 2 DISORDER, MAJOR DEPRESSIVE EPISODE (HCC): ICD-10-CM

## 2024-10-31 RX ORDER — LAMOTRIGINE 25 MG/1
TABLET ORAL
Qty: 180 TABLET | Refills: 0 | Status: SHIPPED | OUTPATIENT
Start: 2024-10-31

## 2024-10-31 RX ORDER — METOPROLOL SUCCINATE 25 MG/1
25 TABLET, EXTENDED RELEASE ORAL DAILY
Qty: 90 TABLET | Refills: 0 | Status: SHIPPED | OUTPATIENT
Start: 2024-10-31 | End: 2025-10-26

## 2025-02-18 DIAGNOSIS — F31.81 BIPOLAR 2 DISORDER, MAJOR DEPRESSIVE EPISODE (HCC): ICD-10-CM

## 2025-02-19 DIAGNOSIS — I49.3 PVC (PREMATURE VENTRICULAR CONTRACTION): ICD-10-CM

## 2025-02-19 DIAGNOSIS — F31.81 BIPOLAR 2 DISORDER, MAJOR DEPRESSIVE EPISODE (HCC): ICD-10-CM

## 2025-02-19 RX ORDER — LAMOTRIGINE 25 MG/1
TABLET ORAL
Qty: 180 TABLET | Refills: 0 | OUTPATIENT
Start: 2025-02-19

## 2025-02-19 RX ORDER — METOPROLOL SUCCINATE 25 MG/1
25 TABLET, EXTENDED RELEASE ORAL DAILY
Qty: 90 TABLET | Refills: 0 | OUTPATIENT
Start: 2025-02-19 | End: 2026-02-14

## 2025-02-19 RX ORDER — LAMOTRIGINE 200 MG/1
TABLET ORAL
Qty: 90 TABLET | Refills: 0 | OUTPATIENT
Start: 2025-02-19

## (undated) DEVICE — TROCAR: Brand: KII FIOS FIRST ENTRY

## (undated) DEVICE — TROCAR: Brand: KII SLEEVE

## (undated) DEVICE — ENDOPATH 5MM CURVED SCISSORS WITH MONOPOLAR CAUTERY: Brand: ENDOPATH

## (undated) DEVICE — SINGLE-USE BIOPSY FORCEPS: Brand: RADIAL JAW 4

## (undated) DEVICE — TUBING SMOKE EVAC W/FILTRATION DEVICE PLUMEPORT ACTIV

## (undated) DEVICE — [HIGH FLOW INSUFFLATOR,  DO NOT USE IF PACKAGE IS DAMAGED,  KEEP DRY,  KEEP AWAY FROM SUNLIGHT,  PROTECT FROM HEAT AND RADIOACTIVE SOURCES.]: Brand: PNEUMOSURE

## (undated) DEVICE — PVC URETHRAL CATHETER: Brand: DOVER

## (undated) DEVICE — SUT MONOCRYL 4-0 PS-2 27 IN Y426H

## (undated) DEVICE — GLOVE INDICATOR PI UNDERGLOVE SZ 8 BLUE

## (undated) DEVICE — BETHLEHEM UNIVERSAL GYN LAP PK: Brand: CARDINAL HEALTH

## (undated) DEVICE — DRAPE EQUIPMENT RF WAND

## (undated) DEVICE — INSUFFLATION NEEDLE TO ESTABLISH PNEUMOPERITONEUM.: Brand: INSUFFLATION NEEDLE

## (undated) DEVICE — BLUE HEAT SCOPE WARMER

## (undated) DEVICE — PREMIUM DRY TRAY LF: Brand: MEDLINE INDUSTRIES, INC.

## (undated) DEVICE — SCD SEQUENTIAL COMPRESSION COMFORT SLEEVE MEDIUM KNEE LENGTH: Brand: KENDALL SCD

## (undated) DEVICE — GLOVE PI ULTRA TOUCH SZ.8.0

## (undated) DEVICE — GLOVE PI ULTRA TOUCH SZ.6.5

## (undated) DEVICE — INTENDED FOR TISSUE SEPARATION, AND OTHER PROCEDURES THAT REQUIRE A SHARP SURGICAL BLADE TO PUNCTURE OR CUT.: Brand: BARD-PARKER SAFETY BLADES SIZE 11, STERILE

## (undated) DEVICE — VIAL DECANTER

## (undated) DEVICE — GLOVE INDICATOR PI UNDERGLOVE SZ 7 BLUE

## (undated) DEVICE — 3M™ STERI-STRIP™ REINFORCED ADHESIVE SKIN CLOSURES, R1547, 1/2 IN X 4 IN (12 MM X 100 MM), 6 STRIPS/ENVELOPE: Brand: 3M™ STERI-STRIP™

## (undated) DEVICE — PLASTIC ADHESIVE BANDAGE: Brand: CURITY

## (undated) DEVICE — CHLORAPREP HI-LITE 26ML ORANGE